# Patient Record
Sex: MALE | Race: WHITE | NOT HISPANIC OR LATINO | Employment: OTHER | URBAN - METROPOLITAN AREA
[De-identification: names, ages, dates, MRNs, and addresses within clinical notes are randomized per-mention and may not be internally consistent; named-entity substitution may affect disease eponyms.]

---

## 2017-08-25 ENCOUNTER — TELEPHONE (OUTPATIENT)
Dept: NEUROSURGERY | Facility: CLINIC | Age: 62
End: 2017-08-25

## 2017-08-25 NOTE — TELEPHONE ENCOUNTER
----- Message from Danika Romero sent at 8/24/2017 11:02 AM CDT -----   Patient would be contacted about, want to get back to full strength since surgery. [phone].

## 2017-09-20 ENCOUNTER — TELEPHONE (OUTPATIENT)
Dept: NEUROSURGERY | Facility: CLINIC | Age: 62
End: 2017-09-20

## 2017-09-20 NOTE — TELEPHONE ENCOUNTER
----- Message from Danika Carrillo LPN sent at 9/20/2017 10:01 AM CDT -----  Patient has questions for you. He had surgery by Dr. Chau a year ago. Please call [phone].

## 2017-09-20 NOTE — TELEPHONE ENCOUNTER
Spoke with pt regarding continuing Pt. Instructed pt to have facility send over a continuation form and we would sign and send it back. Pt verbalized understanding.

## 2017-09-26 ENCOUNTER — TELEPHONE (OUTPATIENT)
Dept: SPINE | Facility: CLINIC | Age: 62
End: 2017-09-26

## 2017-09-26 NOTE — TELEPHONE ENCOUNTER
----- Message from Patti Taylor LPN sent at 9/26/2017 12:26 PM CDT -----  Contact: Haley prajapati/ [facility]  Patient is requesting continued PT. [facility] states they would need a whole new order and not just a continuation signed. Can we do this for the patient without bringing him in for an appointment?  ----- Message -----  From: Aniyah Longoria  Sent: 9/26/2017  11:57 AM  To: Is[facility] Staff    Haley prajapati/ [facility] calling in regards to following up on a referral Physical Therapy. She hasn't received a response and wants to speak with the Nurse. Please advise. Call to pod. Call connected to pod. Warm transferred.  Call back Haley at   Fax   Thanks!

## 2017-09-26 NOTE — TELEPHONE ENCOUNTER
He was last seen in clinic 1 year ago and doing well with resolved pain post operatively.  With documentation of him doing so well after surgery, I do not what he need PT for.  He would need to be re-evaluated in clinic for us to place a PT order.      The alternative (and probably more appropirate) would be for his PCP to place the PT order.

## 2017-09-27 NOTE — TELEPHONE ENCOUNTER
Spoke with patient regarding below information. Patient states he is not having an increase in neck pain and feels he does not need to be reevaluated. He will make this request with his PCP.

## 2017-12-21 ENCOUNTER — HOSPITAL ENCOUNTER (EMERGENCY)
Facility: HOSPITAL | Age: 62
Discharge: HOME | End: 2017-12-21
Attending: EMERGENCY MEDICINE
Payer: MEDICARE

## 2017-12-21 VITALS
HEIGHT: 68 IN | RESPIRATION RATE: 18 BRPM | TEMPERATURE: 98 F | OXYGEN SATURATION: 95 % | DIASTOLIC BLOOD PRESSURE: 84 MMHG | WEIGHT: 179 LBS | BODY MASS INDEX: 27.13 KG/M2 | SYSTOLIC BLOOD PRESSURE: 156 MMHG | HEART RATE: 89 BPM

## 2017-12-21 DIAGNOSIS — R19.7 DIARRHEA, UNSPECIFIED TYPE: Primary | ICD-10-CM

## 2017-12-21 LAB
ANION GAP SERPL CALC-SCNC: 9 MMOL/L
BUN SERPL-MCNC: 10 MG/DL
CALCIUM SERPL-MCNC: 9.9 MG/DL
CHLORIDE SERPL-SCNC: 106 MMOL/L
CO2 SERPL-SCNC: 27 MMOL/L
CREAT SERPL-MCNC: 0.8 MG/DL
EST. GFR  (AFRICAN AMERICAN): >60 ML/MIN/1.73 M^2
EST. GFR  (NON AFRICAN AMERICAN): >60 ML/MIN/1.73 M^2
GLUCOSE SERPL-MCNC: 83 MG/DL
POTASSIUM SERPL-SCNC: 4.1 MMOL/L
RV AG STL QL IA.RAPID: NEGATIVE
SODIUM SERPL-SCNC: 142 MMOL/L
WBC #/AREA STL HPF: NORMAL /[HPF]

## 2017-12-21 PROCEDURE — 99283 EMERGENCY DEPT VISIT LOW MDM: CPT

## 2017-12-21 PROCEDURE — 89055 LEUKOCYTE ASSESSMENT FECAL: CPT

## 2017-12-21 PROCEDURE — 87449 NOS EACH ORGANISM AG IA: CPT

## 2017-12-21 PROCEDURE — 87427 SHIGA-LIKE TOXIN AG IA: CPT | Mod: 59

## 2017-12-21 PROCEDURE — 36415 COLL VENOUS BLD VENIPUNCTURE: CPT

## 2017-12-21 PROCEDURE — 87046 STOOL CULTR AEROBIC BACT EA: CPT | Mod: 59

## 2017-12-21 PROCEDURE — 80048 BASIC METABOLIC PNL TOTAL CA: CPT

## 2017-12-21 PROCEDURE — 87045 FECES CULTURE AEROBIC BACT: CPT

## 2017-12-21 PROCEDURE — 87425 ROTAVIRUS AG IA: CPT

## 2017-12-21 NOTE — ED PROVIDER NOTES
Encounter Date: 12/21/2017    SCRIBE #1 NOTE: I, Belle Song, am scribing for, and in the presence of, Dr. Malhotra.       History     Chief Complaint   Patient presents with    Diarrhea     x 2 weeks / increasing        12/21/2017 1:10 PM     Chief complaint: Diarrhea      Michael German is a 62 y.o. male with history of MS, HTN, GERD, neurogenic bladder who presents to the ED with complaint of increasing diarrhea over the last 3 days. Patient states that he has suffered with GI issues over the last year, initially with constipation and more recently diarrhea. He states that he has intermittently suffered with diarrhea for the last few months, but over the last three days his stools have been more watery and loose with 4-5 bowel movements per day. He describes the stool as a yogurt-like consistency. He reports some intermittent abdominal cramping, but states that is a side effect of a medication he is taking. He denies fever, change in appetite, or other complaints. He denies recent hospitalization, recent antibiotic use, or recent GI procedures. He states that he is scheduled for a colonoscopy, but is having difficulty doing the prep at home. He does have a gastroenterologist (unsure his name) and his PCP is Dr. Zaragoza.      The history is provided by the patient.     Review of patient's allergies indicates:  No Known Allergies  Past Medical History:   Diagnosis Date    Acute urinary retention     Acute UTI     treated twice in past three weeks with antibx per patient-since indwelling catheter    Back problem     herniated disc-lumbar region    GERD (gastroesophageal reflux disease)     Hypertension     130s/80s    MS (multiple sclerosis)     Neck stiffness     Neurogenic bladder     has indwelling catheter now    Sleep disturbance     Snoring     Visual impairment      Past Surgical History:   Procedure Laterality Date    FINGER SURGERY      LASER OF PROSTATE W/ GREEN LIGHT PVP      RECTAL  BOTOX INJECTION       Family History   Problem Relation Age of Onset    Dementia Mother     Parkinsonism Father     No Known Problems Sister     Hypertension Brother     Anesthesia problems Neg Hx      Social History   Substance Use Topics    Smoking status: Never Smoker    Smokeless tobacco: Never Used    Alcohol use Yes      Comment: occasionally drinks     Review of Systems   Constitutional: Negative for chills and fever.   Respiratory: Negative for shortness of breath.    Cardiovascular: Negative for chest pain.   Gastrointestinal: Positive for diarrhea. Negative for nausea and vomiting.        +Intermittent abdominal cramping   Skin: Negative for rash.   All other systems reviewed and are negative.      Physical Exam     Initial Vitals [12/21/17 0948]   BP Pulse Resp Temp SpO2   (!) 163/97 100 20 97.8 °F (36.6 °C) (!) 94 %      MAP       119         Physical Exam    Nursing note and vitals reviewed.  Constitutional: He appears well-developed and well-nourished. He is not diaphoretic.  Non-toxic appearance. He does not have a sickly appearance. He does not appear ill. No distress.   HENT:   Head: Normocephalic and atraumatic.   Eyes: EOM are normal.   Neck: Normal range of motion. Neck supple. Normal range of motion present. No neck rigidity.   Cardiovascular: Normal rate, regular rhythm and normal heart sounds. Exam reveals no gallop and no friction rub.    No murmur heard.  Pulmonary/Chest: Breath sounds normal. No respiratory distress. He has no wheezes. He has no rhonchi. He has no rales.   Abdominal: Soft. There is no tenderness.   Musculoskeletal:   Lower extremity contractures.   Neurological: He is alert and oriented to person, place, and time.   Skin: Skin is warm and dry. No rash noted.   Psychiatric: He has a normal mood and affect. His behavior is normal. Judgment and thought content normal.         ED Course   Procedures  Labs Reviewed   CLOSTRIDIUM DIFFICILE   CULTURE, STOOL    ENTEROHEMORRHAGIC E.COLI   BASIC METABOLIC PANEL   ROTAVIRUS ANTIGEN, STOOL   WBC, STOOL             Medical Decision Making:   History:   Old Medical Records: I decided to obtain old medical records.  Clinical Tests:   Lab Tests: Ordered and Reviewed            Scribe Attestation:   Scribe #1: I performed the above scribed service and the documentation accurately describes the services I performed. I attest to the accuracy of the note.    I, Dr. Malhotra, personally performed the services described in this documentation. All medical record entries made by the scribe were at my direction and in my presence.  I have reviewed the chart and agree that the record reflects my personal performance and is accurate and complete.12:15 AM 12/22/2017            ED Course as of Dec 21 1651   Thu Dec 21, 2017   1649 62-year-old male presents chronic diarrhea was has worsened in the last 3 days.  No risk factors for Clostridium difficile but this was sent in addition to other stool studies.  Patient is well-appearing with a normal metabolic panel.  No fevers no abdominal tenderness no indication for any abdominal imaging.  We will call the patient should any of his stool studies be positive.  [EF]      ED Course User Index  [EF] Luca Malhotra MD     Clinical Impression:   The encounter diagnosis was Diarrhea, unspecified type.    Disposition:   Disposition: Discharged  Condition: Stable                        Luca Malhotra MD  12/22/17 0015

## 2017-12-22 LAB
C DIFF GDH STL QL: NEGATIVE
C DIFF TOX A+B STL QL IA: NEGATIVE

## 2017-12-25 LAB
E COLI SXT1 STL QL IA: NEGATIVE
E COLI SXT2 STL QL IA: NEGATIVE

## 2017-12-26 LAB — BACTERIA STL CULT: NORMAL

## 2019-01-28 ENCOUNTER — HOSPITAL ENCOUNTER (OUTPATIENT)
Facility: HOSPITAL | Age: 64
Discharge: HOME HEALTH | End: 2019-02-01
Attending: EMERGENCY MEDICINE | Admitting: INTERNAL MEDICINE
Payer: MEDICARE

## 2019-01-28 DIAGNOSIS — G35 MS (MULTIPLE SCLEROSIS): ICD-10-CM

## 2019-01-28 DIAGNOSIS — R93.3 ABNORMAL CT SCAN, COLON: ICD-10-CM

## 2019-01-28 DIAGNOSIS — K59.00 CONSTIPATION, UNSPECIFIED CONSTIPATION TYPE: ICD-10-CM

## 2019-01-28 DIAGNOSIS — K56.41 FECAL IMPACTION: ICD-10-CM

## 2019-01-28 DIAGNOSIS — K50.119: ICD-10-CM

## 2019-01-28 DIAGNOSIS — K52.9 COLITIS: ICD-10-CM

## 2019-01-28 DIAGNOSIS — K59.04 CHRONIC IDIOPATHIC CONSTIPATION: Primary | ICD-10-CM

## 2019-01-28 DIAGNOSIS — R10.9 ABDOMINAL PAIN: ICD-10-CM

## 2019-01-28 LAB
ALBUMIN SERPL BCP-MCNC: 4.3 G/DL
ALP SERPL-CCNC: 110 U/L
ALT SERPL W/O P-5'-P-CCNC: 58 U/L
ANION GAP SERPL CALC-SCNC: 10 MMOL/L
AST SERPL-CCNC: 39 U/L
BACTERIA #/AREA URNS HPF: ABNORMAL /HPF
BASOPHILS # BLD AUTO: 0.1 K/UL
BASOPHILS NFR BLD: 0.3 %
BILIRUB SERPL-MCNC: 0.9 MG/DL
BILIRUB UR QL STRIP: NEGATIVE
BUN SERPL-MCNC: 13 MG/DL
CALCIUM SERPL-MCNC: 10.9 MG/DL
CHLORIDE SERPL-SCNC: 102 MMOL/L
CLARITY UR: ABNORMAL
CO2 SERPL-SCNC: 27 MMOL/L
COLOR UR: YELLOW
CREAT SERPL-MCNC: 0.9 MG/DL
DIFFERENTIAL METHOD: ABNORMAL
EOSINOPHIL # BLD AUTO: 0 K/UL
EOSINOPHIL NFR BLD: 0.2 %
ERYTHROCYTE [DISTWIDTH] IN BLOOD BY AUTOMATED COUNT: 13.9 %
EST. GFR  (AFRICAN AMERICAN): >60 ML/MIN/1.73 M^2
EST. GFR  (NON AFRICAN AMERICAN): >60 ML/MIN/1.73 M^2
GLUCOSE SERPL-MCNC: 98 MG/DL
GLUCOSE UR QL STRIP: NEGATIVE
HCT VFR BLD AUTO: 50.3 %
HGB BLD-MCNC: 16.4 G/DL
HGB UR QL STRIP: ABNORMAL
KETONES UR QL STRIP: ABNORMAL
LEUKOCYTE ESTERASE UR QL STRIP: ABNORMAL
LYMPHOCYTES # BLD AUTO: 0.5 K/UL
LYMPHOCYTES NFR BLD: 3.4 %
MCH RBC QN AUTO: 29.6 PG
MCHC RBC AUTO-ENTMCNC: 32.6 G/DL
MCV RBC AUTO: 91 FL
MICROSCOPIC COMMENT: ABNORMAL
MONOCYTES # BLD AUTO: 0.7 K/UL
MONOCYTES NFR BLD: 4.7 %
NEUTROPHILS # BLD AUTO: 14.5 K/UL
NEUTROPHILS NFR BLD: 91.4 %
NITRITE UR QL STRIP: NEGATIVE
PH UR STRIP: 6 [PH] (ref 5–8)
PLATELET # BLD AUTO: 248 K/UL
PMV BLD AUTO: 8.2 FL
POTASSIUM SERPL-SCNC: 4.2 MMOL/L
PROT SERPL-MCNC: 7.6 G/DL
PROT UR QL STRIP: NEGATIVE
RBC # BLD AUTO: 5.55 M/UL
RBC #/AREA URNS HPF: 20 /HPF (ref 0–4)
SODIUM SERPL-SCNC: 139 MMOL/L
SP GR UR STRIP: 1.02 (ref 1–1.03)
URN SPEC COLLECT METH UR: ABNORMAL
UROBILINOGEN UR STRIP-ACNC: 1 EU/DL
WBC # BLD AUTO: 15.9 K/UL
WBC #/AREA URNS HPF: 12 /HPF (ref 0–5)

## 2019-01-28 PROCEDURE — 25000003 PHARM REV CODE 250: Performed by: EMERGENCY MEDICINE

## 2019-01-28 PROCEDURE — S0030 INJECTION, METRONIDAZOLE: HCPCS | Performed by: EMERGENCY MEDICINE

## 2019-01-28 PROCEDURE — 96365 THER/PROPH/DIAG IV INF INIT: CPT

## 2019-01-28 PROCEDURE — 99285 EMERGENCY DEPT VISIT HI MDM: CPT | Mod: 25

## 2019-01-28 PROCEDURE — G0378 HOSPITAL OBSERVATION PER HR: HCPCS

## 2019-01-28 PROCEDURE — 80053 COMPREHEN METABOLIC PANEL: CPT

## 2019-01-28 PROCEDURE — 85025 COMPLETE CBC W/AUTO DIFF WBC: CPT

## 2019-01-28 PROCEDURE — 96375 TX/PRO/DX INJ NEW DRUG ADDON: CPT

## 2019-01-28 PROCEDURE — 81000 URINALYSIS NONAUTO W/SCOPE: CPT

## 2019-01-28 PROCEDURE — 87086 URINE CULTURE/COLONY COUNT: CPT

## 2019-01-28 PROCEDURE — 36415 COLL VENOUS BLD VENIPUNCTURE: CPT

## 2019-01-28 PROCEDURE — 96376 TX/PRO/DX INJ SAME DRUG ADON: CPT

## 2019-01-28 PROCEDURE — 63600175 PHARM REV CODE 636 W HCPCS: Performed by: EMERGENCY MEDICINE

## 2019-01-28 RX ORDER — AMOXICILLIN 250 MG
1 CAPSULE ORAL 2 TIMES DAILY
Status: DISCONTINUED | OUTPATIENT
Start: 2019-01-28 | End: 2019-02-01 | Stop reason: HOSPADM

## 2019-01-28 RX ORDER — METRONIDAZOLE 500 MG/100ML
500 INJECTION, SOLUTION INTRAVENOUS
Status: COMPLETED | OUTPATIENT
Start: 2019-01-28 | End: 2019-01-28

## 2019-01-28 RX ORDER — POLYETHYLENE GLYCOL 3350 17 G/17G
17 POWDER, FOR SOLUTION ORAL DAILY
Status: DISCONTINUED | OUTPATIENT
Start: 2019-01-29 | End: 2019-01-31

## 2019-01-28 RX ORDER — METRONIDAZOLE 500 MG/100ML
500 INJECTION, SOLUTION INTRAVENOUS
Status: DISCONTINUED | OUTPATIENT
Start: 2019-01-29 | End: 2019-01-31

## 2019-01-28 RX ORDER — GLUCAGON 1 MG
1 KIT INJECTION
Status: DISCONTINUED | OUTPATIENT
Start: 2019-01-28 | End: 2019-02-01 | Stop reason: HOSPADM

## 2019-01-28 RX ORDER — CIPROFLOXACIN 2 MG/ML
400 INJECTION, SOLUTION INTRAVENOUS
Status: DISCONTINUED | OUTPATIENT
Start: 2019-01-29 | End: 2019-01-31

## 2019-01-28 RX ORDER — POTASSIUM CHLORIDE 20 MEQ/15ML
40 SOLUTION ORAL
Status: DISCONTINUED | OUTPATIENT
Start: 2019-01-28 | End: 2019-02-01 | Stop reason: HOSPADM

## 2019-01-28 RX ORDER — CALCIUM CARBONATE 200(500)MG
2 TABLET,CHEWABLE ORAL 3 TIMES DAILY PRN
Status: DISCONTINUED | OUTPATIENT
Start: 2019-01-28 | End: 2019-02-01 | Stop reason: HOSPADM

## 2019-01-28 RX ORDER — CALCIUM CARBONATE 200(500)MG
2 TABLET,CHEWABLE ORAL EVERY 8 HOURS PRN
COMMUNITY

## 2019-01-28 RX ORDER — LOSARTAN POTASSIUM 25 MG/1
100 TABLET ORAL DAILY
Status: DISCONTINUED | OUTPATIENT
Start: 2019-01-29 | End: 2019-02-01 | Stop reason: HOSPADM

## 2019-01-28 RX ORDER — SODIUM CHLORIDE 9 MG/ML
INJECTION, SOLUTION INTRAVENOUS CONTINUOUS
Status: DISCONTINUED | OUTPATIENT
Start: 2019-01-28 | End: 2019-02-01 | Stop reason: HOSPADM

## 2019-01-28 RX ORDER — PANTOPRAZOLE SODIUM 40 MG/1
40 TABLET, DELAYED RELEASE ORAL DAILY
COMMUNITY
End: 2021-01-20 | Stop reason: SDUPTHER

## 2019-01-28 RX ORDER — RAMELTEON 8 MG/1
8 TABLET ORAL NIGHTLY PRN
Status: DISCONTINUED | OUTPATIENT
Start: 2019-01-28 | End: 2019-02-01 | Stop reason: HOSPADM

## 2019-01-28 RX ORDER — IBUPROFEN 200 MG
24 TABLET ORAL
Status: DISCONTINUED | OUTPATIENT
Start: 2019-01-28 | End: 2019-02-01 | Stop reason: HOSPADM

## 2019-01-28 RX ORDER — LANOLIN ALCOHOL/MO/W.PET/CERES
800 CREAM (GRAM) TOPICAL
Status: DISCONTINUED | OUTPATIENT
Start: 2019-01-28 | End: 2019-02-01 | Stop reason: HOSPADM

## 2019-01-28 RX ORDER — PANTOPRAZOLE SODIUM 40 MG/1
40 TABLET, DELAYED RELEASE ORAL DAILY
Status: DISCONTINUED | OUTPATIENT
Start: 2019-01-29 | End: 2019-02-01 | Stop reason: HOSPADM

## 2019-01-28 RX ORDER — ACETAMINOPHEN 500 MG
1000 TABLET ORAL EVERY 6 HOURS PRN
Status: DISCONTINUED | OUTPATIENT
Start: 2019-01-28 | End: 2019-02-01 | Stop reason: HOSPADM

## 2019-01-28 RX ORDER — POTASSIUM CHLORIDE 20 MEQ/15ML
60 SOLUTION ORAL
Status: DISCONTINUED | OUTPATIENT
Start: 2019-01-28 | End: 2019-02-01 | Stop reason: HOSPADM

## 2019-01-28 RX ORDER — IBUPROFEN 200 MG
16 TABLET ORAL
Status: DISCONTINUED | OUTPATIENT
Start: 2019-01-28 | End: 2019-02-01 | Stop reason: HOSPADM

## 2019-01-28 RX ORDER — LOPERAMIDE HYDROCHLORIDE 2 MG/1
2 CAPSULE ORAL 4 TIMES DAILY PRN
COMMUNITY
End: 2019-02-20

## 2019-01-28 RX ORDER — ONDANSETRON 2 MG/ML
8 INJECTION INTRAMUSCULAR; INTRAVENOUS EVERY 8 HOURS PRN
Status: DISCONTINUED | OUTPATIENT
Start: 2019-01-28 | End: 2019-02-01 | Stop reason: HOSPADM

## 2019-01-28 RX ORDER — LOSARTAN POTASSIUM 100 MG/1
100 TABLET ORAL DAILY
COMMUNITY
End: 2021-01-08 | Stop reason: SDUPTHER

## 2019-01-28 RX ORDER — CIPROFLOXACIN 2 MG/ML
400 INJECTION, SOLUTION INTRAVENOUS
Status: COMPLETED | OUTPATIENT
Start: 2019-01-28 | End: 2019-01-28

## 2019-01-28 RX ORDER — ENOXAPARIN SODIUM 100 MG/ML
40 INJECTION SUBCUTANEOUS EVERY 24 HOURS
Status: DISCONTINUED | OUTPATIENT
Start: 2019-01-28 | End: 2019-02-01 | Stop reason: HOSPADM

## 2019-01-28 RX ORDER — SODIUM CHLORIDE 0.9 % (FLUSH) 0.9 %
5 SYRINGE (ML) INJECTION
Status: DISCONTINUED | OUTPATIENT
Start: 2019-01-28 | End: 2019-02-01 | Stop reason: HOSPADM

## 2019-01-28 RX ADMIN — SODIUM CHLORIDE 1000 ML: 0.9 INJECTION, SOLUTION INTRAVENOUS at 09:01

## 2019-01-28 RX ADMIN — SODIUM PHOSPHATE, DIBASIC AND SODIUM PHOSPHATE, MONOBASIC 1 ENEMA: 7; 19 ENEMA RECTAL at 05:01

## 2019-01-28 RX ADMIN — CIPROFLOXACIN 400 MG: 2 INJECTION, SOLUTION INTRAVENOUS at 10:01

## 2019-01-28 RX ADMIN — METRONIDAZOLE 500 MG: 500 INJECTION, SOLUTION INTRAVENOUS at 09:01

## 2019-01-28 NOTE — ED NOTES
Enema not given very large loose brown stool noted up patients back to his knees. Bed bath given clean brief and chux applied. Able to assist with rolling in bed. Pt grey shorts placed in bag given to wife. MD aware med not given

## 2019-01-28 NOTE — ED PROVIDER NOTES
Encounter Date: 1/28/2019    SCRIBE #1 NOTE: I, Tricia Pollack, am scribing for, and in the presence of, Dr. Fairchild.       History     Chief Complaint   Patient presents with    Abdominal Pain     lower abd. pain / hx. constipation      01/28/2019  4:35 PM     Michael German is a 63 y.o. male who is presenting to ED for evaluation of lower abd pain since this morning. He describes his pain as a pressure in his lower abd radiating to his rectum. He also c/o constipation and his last normal bowel movement was four days ago. Denies nausea, vomiting, diarrhea, or fever. No other complaints noted at this time. Pt has a PMHx of GERD, Hypertension, MS, Neurogenic bladder. Pt has a PSHx that includes Laser of prostate w/ green light pvp; Finger surgery; and Rectal Botox injection.           The history is provided by the patient and a relative.     Review of patient's allergies indicates:  No Known Allergies  Past Medical History:   Diagnosis Date    Acute urinary retention     Acute UTI     treated twice in past three weeks with antibx per patient-since indwelling catheter    Back problem     herniated disc-lumbar region    GERD (gastroesophageal reflux disease)     Hypertension     130s/80s    MS (multiple sclerosis)     Neck stiffness     Neurogenic bladder     has indwelling catheter now    Sleep disturbance     Snoring     Visual impairment      Past Surgical History:   Procedure Laterality Date    CYSTOSCOPY N/A 4/19/2016    [facility]    CYSTOSCOPY N/A 5/6/2014    [facility] at [facility]    DISKECTOMY AND FUSION-ANTERIOR CERVICAL (ACDF)  C3/4 ACDF N/A 8/15/2016    [facility]    FINGER SURGERY      INJECTION, BOTULINUM TOXIN, TYPE A N/A 5/6/2014    [facility]    INJECTION-BOTOX N/A 4/19/2016    [facility]     LASER OF PROSTATE W/ GREEN LIGHT PVP      RECTAL BOTOX INJECTION      URODYNAMIC STUDY, FLUOROSCOPIC N/A 3/12/2014    Performed by [facility] at [facility] OR [address] [place] [address]     Family History   Problem Relation Age of Onset    Dementia Mother     Parkinsonism Father     No Known Problems Sister     Hypertension Brother     Anesthesia problems Neg Hx      Social History     Tobacco Use    Smoking status: Never Smoker    Smokeless tobacco: Never Used   Substance Use Topics    Alcohol use: Yes     Comment: occasionally drinks    Drug use: No     Review of Systems   Constitutional: Negative for activity change, appetite change, chills, fatigue and fever.   HENT: Negative for congestion.    Eyes: Negative for visual disturbance.   Respiratory: Negative for apnea and shortness of breath.    Cardiovascular: Negative for chest pain and palpitations.   Gastrointestinal: Positive for abdominal pain and constipation. Negative for abdominal distention, diarrhea, nausea and vomiting.        Positive for rectal pain.   Genitourinary: Negative for difficulty urinating.   Musculoskeletal: Negative for neck pain.   Skin: Negative for pallor and rash.   Neurological: Negative for headaches.   Hematological: Does not bruise/bleed easily.   Psychiatric/Behavioral: Negative for agitation.       Physical Exam     Initial Vitals [01/28/19 1541]   BP Pulse Resp Temp SpO2   111/74 86 16 97.6 °F (36.4 °C) 97 %      MAP       --         Physical Exam    Nursing note and vitals reviewed.  Constitutional: He appears well-developed and well-nourished.   HENT:   Head: Normocephalic and atraumatic.   Eyes: Conjunctivae are normal.   Neck: Normal range of motion. Neck supple.   Cardiovascular: Normal rate, regular rhythm and normal heart sounds. Exam reveals no gallop and no friction rub.    No murmur heard.  Pulmonary/Chest: Breath sounds normal. No respiratory distress. He has no wheezes. He has no rhonchi. He has no rales.   Abdominal:  Soft. He exhibits no distension. There is no tenderness.   Musculoskeletal: Normal range of motion.   Neurological: He is alert and oriented to person, place, and time.   Skin: Skin is warm and dry.   Psychiatric: He has a normal mood and affect.         ED Course   Procedures  Labs Reviewed   CBC W/ AUTO DIFFERENTIAL - Abnormal; Notable for the following components:       Result Value    WBC 15.90 (*)     MPV 8.2 (*)     Gran # (ANC) 14.5 (*)     Lymph # 0.5 (*)     Gran% 91.4 (*)     Lymph% 3.4 (*)     All other components within normal limits   COMPREHENSIVE METABOLIC PANEL - Abnormal; Notable for the following components:    Calcium 10.9 (*)     ALT 58 (*)     All other components within normal limits   URINALYSIS, REFLEX TO URINE CULTURE - Abnormal; Notable for the following components:    Appearance, UA Hazy (*)     Ketones, UA Trace (*)     Occult Blood UA 3+ (*)     Leukocytes, UA Trace (*)     All other components within normal limits    Narrative:     Preferred Collection Type->Urine, Clean Catch   URINALYSIS MICROSCOPIC - Abnormal; Notable for the following components:    RBC, UA 20 (*)     WBC, UA 12 (*)     Bacteria, UA Many (*)     All other components within normal limits    Narrative:     Preferred Collection Type->Urine, Clean Catch   CULTURE, URINE          Imaging Results          CT Abdomen Pelvis  Without Contrast (In process)                  Medical Decision Making:   History:   Old Medical Records: I decided to obtain old medical records.  ED Management:  63-year-old male with a history of advanced MS presents with bilateral lower abdominal pain with fecal impaction.  CT of the abdomen and pelvis reveals Stercoral colitis and he has leukocytosis with a WBC 56207.  He will be admitted for IV antibiotics and bowel cleansing.       APC / Resident Notes:   I, Dr. Anirudh Fairchild III, personally performed the services described in this documentation. All medical record entries made by the scribe  were at my direction and in my presence.  I have reviewed the chart and agree that the record reflects my personal performance and is accurate and complete       Scribe Attestation:   Scribe #1: I performed the above scribed service and the documentation accurately describes the services I performed. I attest to the accuracy of the note.            ED Course as of Jan 30 0913   Mon Jan 28, 2019 2038 No CT evidence of is significant diverticular disease or acute diverticulitis.    Large amount of stool within the rectosigmoid colon with thickening of the perirectal fascia, consistent with stercoral colitis.    Indeterminate sclerotic lesion involving the right distal humerus.  Outpatient MRI of the right elbow with contrast may be attempted for further evaluation.      Electronically signed by: Stanley Martines MD  Date: 01/28/2019  Time: 19:58  [BD]   2038 Joint decision making was made with patient and wife.  Patient had large stool on arrival as well as large stool after enema was given.  Despite both bowel movements patient's CT after bowel movement showed large amount of stool within the rectosigmoid colon with findings consistent for stercoral colitis.  Will admit for IV fluids antibiotics and GI consult morning.  [BD]      ED Course User Index  [BD] Quang Rangel MD     Clinical Impression:     1. Constipation, unspecified constipation type    2. Abdominal pain                                   Anirudh Fairchild III, MD  01/30/19 4971

## 2019-01-29 PROBLEM — K50.119: Status: ACTIVE | Noted: 2019-01-28

## 2019-01-29 LAB
ALBUMIN SERPL BCP-MCNC: 3.5 G/DL
ALP SERPL-CCNC: 90 U/L
ALT SERPL W/O P-5'-P-CCNC: 42 U/L
ANION GAP SERPL CALC-SCNC: 8 MMOL/L
AST SERPL-CCNC: 31 U/L
BASOPHILS # BLD AUTO: 0 K/UL
BASOPHILS NFR BLD: 0.1 %
BILIRUB SERPL-MCNC: 0.8 MG/DL
BUN SERPL-MCNC: 13 MG/DL
CALCIUM SERPL-MCNC: 9.5 MG/DL
CHLORIDE SERPL-SCNC: 109 MMOL/L
CO2 SERPL-SCNC: 23 MMOL/L
CREAT SERPL-MCNC: 0.8 MG/DL
DIFFERENTIAL METHOD: ABNORMAL
EOSINOPHIL # BLD AUTO: 0.1 K/UL
EOSINOPHIL NFR BLD: 0.7 %
ERYTHROCYTE [DISTWIDTH] IN BLOOD BY AUTOMATED COUNT: 14.1 %
EST. GFR  (AFRICAN AMERICAN): >60 ML/MIN/1.73 M^2
EST. GFR  (NON AFRICAN AMERICAN): >60 ML/MIN/1.73 M^2
GLUCOSE SERPL-MCNC: 110 MG/DL
HCT VFR BLD AUTO: 44.1 %
HGB BLD-MCNC: 14.5 G/DL
LYMPHOCYTES # BLD AUTO: 0.7 K/UL
LYMPHOCYTES NFR BLD: 7.4 %
MCH RBC QN AUTO: 29.9 PG
MCHC RBC AUTO-ENTMCNC: 32.9 G/DL
MCV RBC AUTO: 91 FL
MONOCYTES # BLD AUTO: 0.8 K/UL
MONOCYTES NFR BLD: 8.4 %
NEUTROPHILS # BLD AUTO: 7.8 K/UL
NEUTROPHILS NFR BLD: 83.4 %
PLATELET # BLD AUTO: 224 K/UL
PMV BLD AUTO: 9 FL
POTASSIUM SERPL-SCNC: 3.7 MMOL/L
PROT SERPL-MCNC: 6.1 G/DL
RBC # BLD AUTO: 4.84 M/UL
SODIUM SERPL-SCNC: 140 MMOL/L
TSH SERPL DL<=0.005 MIU/L-ACNC: 2.95 UIU/ML
WBC # BLD AUTO: 9.4 K/UL

## 2019-01-29 PROCEDURE — 63600175 PHARM REV CODE 636 W HCPCS: Performed by: NURSE PRACTITIONER

## 2019-01-29 PROCEDURE — 25000003 PHARM REV CODE 250: Performed by: NURSE PRACTITIONER

## 2019-01-29 PROCEDURE — 96367 TX/PROPH/DG ADDL SEQ IV INF: CPT

## 2019-01-29 PROCEDURE — 99204 PR OFFICE/OUTPT VISIT, NEW, LEVL IV, 45-59 MIN: ICD-10-PCS | Mod: ,,, | Performed by: INTERNAL MEDICINE

## 2019-01-29 PROCEDURE — 85025 COMPLETE CBC W/AUTO DIFF WBC: CPT

## 2019-01-29 PROCEDURE — 84443 ASSAY THYROID STIM HORMONE: CPT

## 2019-01-29 PROCEDURE — 99204 OFFICE O/P NEW MOD 45 MIN: CPT | Mod: ,,, | Performed by: INTERNAL MEDICINE

## 2019-01-29 PROCEDURE — 96361 HYDRATE IV INFUSION ADD-ON: CPT | Mod: 59

## 2019-01-29 PROCEDURE — S0030 INJECTION, METRONIDAZOLE: HCPCS | Performed by: NURSE PRACTITIONER

## 2019-01-29 PROCEDURE — 36415 COLL VENOUS BLD VENIPUNCTURE: CPT

## 2019-01-29 PROCEDURE — 80053 COMPREHEN METABOLIC PANEL: CPT

## 2019-01-29 PROCEDURE — 25000003 PHARM REV CODE 250: Performed by: INTERNAL MEDICINE

## 2019-01-29 PROCEDURE — G0378 HOSPITAL OBSERVATION PER HR: HCPCS

## 2019-01-29 PROCEDURE — 96366 THER/PROPH/DIAG IV INF ADDON: CPT

## 2019-01-29 PROCEDURE — 96372 THER/PROPH/DIAG INJ SC/IM: CPT | Mod: 59

## 2019-01-29 RX ORDER — DEXTROMETHORPHAN POLISTIREX 30 MG/5 ML
1 SUSPENSION, EXTENDED RELEASE 12 HR ORAL ONCE
Status: COMPLETED | OUTPATIENT
Start: 2019-01-29 | End: 2019-01-29

## 2019-01-29 RX ADMIN — ENOXAPARIN SODIUM 40 MG: 100 INJECTION SUBCUTANEOUS at 12:01

## 2019-01-29 RX ADMIN — LOSARTAN POTASSIUM 100 MG: 25 TABLET, FILM COATED ORAL at 09:01

## 2019-01-29 RX ADMIN — MINERAL OIL 1 ENEMA: 100 ENEMA RECTAL at 10:01

## 2019-01-29 RX ADMIN — ACETAMINOPHEN 1000 MG: 500 TABLET, COATED ORAL at 02:01

## 2019-01-29 RX ADMIN — STANDARDIZED SENNA CONCENTRATE AND DOCUSATE SODIUM 1 TABLET: 8.6; 5 TABLET, FILM COATED ORAL at 09:01

## 2019-01-29 RX ADMIN — Medication: at 12:01

## 2019-01-29 RX ADMIN — PANTOPRAZOLE SODIUM 40 MG: 40 TABLET, DELAYED RELEASE ORAL at 09:01

## 2019-01-29 RX ADMIN — CIPROFLOXACIN 400 MG: 2 INJECTION, SOLUTION INTRAVENOUS at 09:01

## 2019-01-29 RX ADMIN — ENOXAPARIN SODIUM 40 MG: 100 INJECTION SUBCUTANEOUS at 05:01

## 2019-01-29 RX ADMIN — POLYETHYLENE GLYCOL 3350 17 G: 17 POWDER, FOR SOLUTION ORAL at 09:01

## 2019-01-29 RX ADMIN — SODIUM CHLORIDE: 0.9 INJECTION, SOLUTION INTRAVENOUS at 11:01

## 2019-01-29 RX ADMIN — METRONIDAZOLE 500 MG: 500 INJECTION, SOLUTION INTRAVENOUS at 05:01

## 2019-01-29 RX ADMIN — METRONIDAZOLE 500 MG: 500 INJECTION, SOLUTION INTRAVENOUS at 02:01

## 2019-01-29 RX ADMIN — METRONIDAZOLE 500 MG: 500 INJECTION, SOLUTION INTRAVENOUS at 09:01

## 2019-01-29 RX ADMIN — STANDARDIZED SENNA CONCENTRATE AND DOCUSATE SODIUM 1 TABLET: 8.6; 5 TABLET, FILM COATED ORAL at 12:01

## 2019-01-29 RX ADMIN — SODIUM CHLORIDE: 0.9 INJECTION, SOLUTION INTRAVENOUS at 12:01

## 2019-01-29 NOTE — ASSESSMENT & PLAN NOTE
With resultant colitis, severe pain.  Enema for bowel clearance and initiation of daily bowel regimen: miralax, senna to start.

## 2019-01-29 NOTE — ASSESSMENT & PLAN NOTE
Acute, severe enough  To cause colitis-; will check TSH and initiate bowel regimen per GI recommendations  With resultant colitis, severe pain.  Enema for bowel clearance and initiation of daily bowel regimen: miralax, senna to start.

## 2019-01-29 NOTE — ASSESSMENT & PLAN NOTE
Stercoral colitis in constipated patient.  No response to fleet's enema.  Significant pain.  Will give brown bomb enema to facilitate bowel clearance and then institute bowel regimen including miralax, senna.  Consult with GI for further recommendations prior to discharge.  Not certain what role abx play here as his colitis is from constipation and we will treat cause.  Will continue cipro/flagyl until seen by GI. Hydrate. Discussed bowel rest with patient-- he is hungry and requests full liquids.

## 2019-01-29 NOTE — HPI
Michael German is a 64 yo male with PMHx significant for MS, chronic bowel issues fluctuating between constipation and diarrhea, and HTN.  He presented to the ED with c/o 8/10 lower abdominal pain with radiation to his rectum.  He reports no BM x 4 days and prior to that had been only having watery stool so he began taking imodium.  He stopped the imodium once he had stopped having BMs.  He denies trying anything at home for constipation. He denies any N/V, fever/chills, CP, or SOB. He reports some recent difficulty emptying his bladder 2/2 constipation. He is wheelchair bound with MS for which he has failed all attempted treatment regimens. He was given a fleets enema in ED and has has some liquid stool since, but continues with rectal pressure and pain rated 3/10 at present.  CT with large stool burden.  Other pertinent medical history as below:

## 2019-01-29 NOTE — ASSESSMENT & PLAN NOTE
Urinating okay with reported incomplete emptying.  If issues with urinary retention may require straight cath.

## 2019-01-29 NOTE — ASSESSMENT & PLAN NOTE
Chronic, stable.  Continue losartan, monitor BP closely, and titrate medication as required for sustained BP control and avoid hypotension  Hypertension Medications             losartan (COZAAR) 100 MG tablet Take 100 mg by mouth once daily.      Hospital Medications             losartan tablet 100 mg Take 4 tablets (100 mg total) by mouth once daily.

## 2019-01-29 NOTE — ED NOTES
Patient identifiers for Michael German checked and correct.  LOC: Patient is awake, alert, and aware of environment with an appropriate affect. Patient is oriented x 3 and speaking appropriately. Wife is caregiver at home has bath aides and wound care, alert calm uses call light for needs.   APPEARANCE: Patient resting comfortably and in no acute distress. Patient is clean and well groomed, patient's clothing is properly fastened.  SKIN: The skin is warm and dry. Patient has normal skin turgor and moist mucus membrances. Skin is intact; no bruising or breakdown noted. Except for red and dark area to buttocks has wound care at home dressing was in place on admit to ER but soiled with stool, dressing removed picture of wound in chart per PA, small areas of skin break down states he has had wound for over a year   MUSKULOSKELETAL: Pt has a HX of MS nonambulatory, contractures noted to left arm and partial to right arm decreased sensation, uses motorized WC. Randee lift used to transfer pt to bed on admit.   RESPIRATORY: Airway is open and patent. Respirations are spontaneous and non-labored with normal effort and rate.  CARDIAC: Patient has a normal rate and rhythm. No peripheral edema noted. Capillary refill < 3 seconds.  ABDOMEN: No distention noted. Bowel sounds active in all 4 quadrants. Soft and non-tender upon palpation. Incontinent of bowel and bladder able at times to feel BM, states constipation for a week PTA, on admit large liquid stool noted, stool x4 during ER visit large amounts liquid brown.   NEUROLOGICAL: PERRL. Facial expression is symmetrical. Decreased sensation to extremities and incontinent of bowel and bladder due to MS, right arm contracture, left arm partially contracted. Able to assist with rolling in bed needs assist with position change.   Allergies reported: Review of patient's allergies indicates:  No Known Allergies

## 2019-01-29 NOTE — ASSESSMENT & PLAN NOTE
Failed outpatient medications; follows with Dr. Sanchez.  Has home PT/OT.  Consult while here an continue f/u outpatient.

## 2019-01-29 NOTE — ASSESSMENT & PLAN NOTE
Stercoral colitis in constipated patient.  No response to fleet's enema.  Significant pain.   Improved after  brown bomb enema to facilitate bowel clearance and then institute disimpaction of stool ball per GI recommendations-; after disimpaction gastrograffin enema recommended.      Consulted with GI for further recommendations as ntoed above   .Continue  abx -- colitis is from constipation and we will treat cause.  Will continue cipro/flagyl until seen by GI. Hydrate intravenously and monitor closely for evidence of ischemia/bleeding

## 2019-01-29 NOTE — ED NOTES
Large amount liquid brown stool noted, bed bath given new chux pads placed. Mikhail MUÑOZ NP in to see pt picture of coccyx wound taken and in chart.   IV infusing well. Wife remains at bedside

## 2019-01-29 NOTE — SUBJECTIVE & OBJECTIVE
Past Medical History:   Diagnosis Date    Acute urinary retention     Acute UTI     treated twice in past three weeks with antibx per patient-since indwelling catheter    Back problem     herniated disc-lumbar region    GERD (gastroesophageal reflux disease)     Hypertension     130s/80s    MS (multiple sclerosis)     Neck stiffness     Neurogenic bladder     has indwelling catheter now    Sleep disturbance     Snoring     Visual impairment        Past Surgical History:   Procedure Laterality Date    CYSTOSCOPY N/A 4/19/2016    Performed by Darline [facility]cid MD at [facility] OR [facility]    CYSTOSCOPY N/A 5/6/2014    Performed by [facility]line [facility]id [facility] at [facility] OR [facility]    DISKECTOMY AND FUSION-ANTERIOR CERVICAL (ACDF)  C3/4 ACDF N/A 8/15/2016    Performed by Shant Chau MD at [facility] OR    FINGER SURGERY      INJECTION, BOTULINUM TOXIN, TYPE A N/A 5/6/2014    Performed by [facility] at [facility] OR [facility]    INJECTION-BOTOX N/A 4/19/2016    Performed by [facility]cid MD at [facility] OR [facility]    LASER OF PROSTATE W/ GREEN LIGHT PVP      RECTAL BOTOX INJECTION      URODYNAMIC STUDY, FLUOROSCOPIC N/A 3/12/2014    Performed by Darline Delcid MD at [facility] OR [facility]       Review of patient's allergies indicates:  No Known Allergies    Current Facility-Administered Medications on File Prior to Encounter   Medication    0.9%  NaCl infusion     Current Outpatient Medications on File Prior to Encounter   Medication Sig    calcium carbonate (TUMS) 200 mg calcium (500 mg) chewable tablet Take 2 tablets by mouth 3 (three) times daily as needed for Heartburn.    loperamide (IMODIUM) 2 mg capsule Take 2 mg by mouth 4 (four) times daily as needed for Diarrhea.    losartan (COZAAR) 100 MG tablet Take 100 mg by mouth once daily.    pantoprazole (PROTONIX) 40 MG tablet Take 40 mg by mouth once daily.    [DISCONTINUED] ascorbic acid (VITAMIN C) 1000 MG tablet Take 1,000 mg by mouth once daily.       [DISCONTINUED] clotrimazole (LOTRIMIN) 1 % cream Apply topically 2 (two) times daily. (Patient taking differently: Apply topically 2 (two) times daily as needed. )    [DISCONTINUED] cyanocobalamin (VITAMIN B-12) 500 MCG tablet Take 500 mcg by mouth once daily.    [DISCONTINUED] docusate sodium (COLACE) 100 MG capsule Take 1 capsule (100 mg total) by mouth once daily.    [DISCONTINUED] enalapril (VASOTEC) 20 MG tablet Take 20 mg by mouth every evening.     [DISCONTINUED] ibuprofen (ADVIL,MOTRIN) 200 MG tablet Take 400 mg by mouth every evening. Per Dr. Chau pt to hold for 10 days prior to surgery    [DISCONTINUED] magnesium oxide (MAG-OX) 400 mg tablet Take 400 mg by mouth every evening.    [DISCONTINUED] miconazole (MICOTIN) 2 % cream Apply topically 2 (two) times daily. Apply in-between toes    [DISCONTINUED] mineral oil liquid Take 30 mLs by mouth daily as needed for Constipation.    [DISCONTINUED] oxycodone-acetaminophen (PERCOCET) 7.5-325 mg per tablet Take 1 tablet by mouth every 4 (four) hours as needed.    [DISCONTINUED] pantoprazole (PROTONIX) 20 MG tablet Take 1 tablet (20 mg total) by mouth once daily. (Patient taking differently: Take 20 mg by mouth every evening. )    [DISCONTINUED] polyethylene glycol (GLYCOLAX) 17 gram PwPk Take 17 g by mouth once daily. (Patient taking differently: Take 17 g by mouth continuous prn. )    [DISCONTINUED] tizanidine (ZANAFLEX) 2 MG tablet Take 2 tablets (4 mg total) by mouth every 8 (eight) hours as needed (spasm).     Family History     Problem Relation (Age of Onset)    Dementia Mother    Hypertension Brother    No Known Problems Sister    Parkinsonism Father        Tobacco Use    Smoking status: Never Smoker    Smokeless tobacco: Never Used   Substance and Sexual Activity    Alcohol use: Yes     Comment: occasionally drinks    Drug use: No    Sexual activity: Not on file     Review of Systems   Constitutional: Negative for activity change, appetite  change, chills, fatigue and fever.   HENT: Negative for congestion, postnasal drip, sore throat and trouble swallowing.    Eyes: Negative for photophobia and visual disturbance.   Respiratory: Negative for cough, shortness of breath and wheezing.    Cardiovascular: Negative for chest pain, palpitations and leg swelling.   Gastrointestinal: Positive for abdominal pain and constipation. Negative for abdominal distention, blood in stool, diarrhea, nausea and vomiting.   Genitourinary: Positive for difficulty urinating. Negative for dysuria, flank pain, hematuria and urgency.   Musculoskeletal: Negative for arthralgias and myalgias.   Skin: Negative for color change.   Neurological: Positive for weakness (chronic, unchanged). Negative for dizziness and light-headedness.   Psychiatric/Behavioral: Negative for confusion. The patient is not nervous/anxious.      Objective:     Vital Signs (Most Recent):  Temp: 97.6 °F (36.4 °C) (01/28/19 1541)  Pulse: 86 (01/28/19 1541)  Resp: 16 (01/28/19 1541)  BP: 111/74 (01/28/19 1541)  SpO2: 97 % (01/28/19 1541) Vital Signs (24h Range):  Temp:  [97.6 °F (36.4 °C)] 97.6 °F (36.4 °C)  Pulse:  [86] 86  Resp:  [16] 16  SpO2:  [97 %] 97 %  BP: (111)/(74) 111/74     Weight: 76.2 kg (168 lb)  Body mass index is 25.54 kg/m².    Physical Exam   Constitutional: He is oriented to person, place, and time. He appears well-developed and well-nourished. No distress.   HENT:   Head: Normocephalic and atraumatic.   Eyes: Conjunctivae and EOM are normal. Pupils are equal, round, and reactive to light.   Neck: Normal range of motion. Neck supple. No thyromegaly present.   Cardiovascular: Normal rate, regular rhythm, normal heart sounds and intact distal pulses.   Pulmonary/Chest: Effort normal and breath sounds normal. No respiratory distress.   Abdominal: Soft. Bowel sounds are normal. He exhibits distension. There is no tenderness.   Musculoskeletal: He exhibits no edema or tenderness.   RIGHT arm  contracted; minimal movement BLEs (Chronic)     Neurological: He is alert and oriented to person, place, and time. No cranial nerve deficit.   Skin: Skin is warm and dry. Capillary refill takes less than 2 seconds. No erythema.   Sacral wound-- seems to be well-healing.  See photo.     Psychiatric: He has a normal mood and affect. His behavior is normal. Judgment and thought content normal.         CRANIAL NERVES     CN III, IV, VI   Pupils are equal, round, and reactive to light.  Extraocular motions are normal.            Significant Labs:   CBC:   Recent Labs   Lab 01/28/19  1705   WBC 15.90*   HGB 16.4   HCT 50.3        CMP:   Recent Labs   Lab 01/28/19  1705      K 4.2      CO2 27   GLU 98   BUN 13   CREATININE 0.9   CALCIUM 10.9*   PROT 7.6   ALBUMIN 4.3   BILITOT 0.9   ALKPHOS 110   AST 39   ALT 58*   ANIONGAP 10   EGFRNONAA >60       Significant Imaging:   CT A/P: Large amount of stool within the rectosigmoid colon with thickening of the perirectal fascia, consistent with stercoral colitis.    Indeterminate sclerotic lesion involving the right distal humerus.  Outpatient MRI of the right elbow with contrast may be attempted for further evaluation.

## 2019-01-29 NOTE — PROGRESS NOTES
[facility] Hospital Medicine  Progress Note    Patient Name: Michael German  MRN: [id number]  Patient Class: OP- Observation   Admission Date: 1/28/2019  Length of Stay: 0 days  Attending Physician: Carlie Ma MD  Primary Care Provider: Av Zaragoza MD        Subjective:     Principal Problem:Colitis, regional, unspecified complication    HPI:  Michael German is a 62 yo male with PMHx significant for MS, chronic bowel issues fluctuating between constipation and diarrhea, and HTN.  He presented to the ED with c/o 8/10 lower abdominal pain with radiation to his rectum.  He reports no BM x 4 days and prior to that had been only having watery stool so he began taking imodium.  He stopped the imodium once he had stopped having BMs.  He denies trying anything at home for constipation. He denies any N/V, fever/chills, CP, or SOB. He reports some recent difficulty emptying his bladder 2/2 constipation. He is wheelchair bound with MS for which he has failed all attempted treatment regimens. He was given a fleets enema in ED and has has some liquid stool since, but continues with rectal pressure and pain rated 3/10 at present.  CT with large stool burden.  Other pertinent medical history as below:    Hospital Course:  No notes on file    Interval History:   Pt seen/examined-less abd pain -has had multiple stools today   Nurses report no stool in rectal vault-  Discussed with Dr Burdick-gastrograffin enema next step         Review of Systems   Constitutional: Negative for activity change, chills, fatigue and fever.   HENT: Negative for postnasal drip and sore throat.    Respiratory: Negative for cough, shortness of breath and wheezing.    Cardiovascular: Negative for chest pain, palpitations and leg swelling.   Gastrointestinal: Positive for abdominal pain and constipation. Negative for abdominal distention, blood in stool, diarrhea, nausea and vomiting.   Genitourinary: Positive for difficulty  urinating. Negative for dysuria, flank pain, hematuria and urgency.   Musculoskeletal: Negative for arthralgias and myalgias.   Skin: Negative for color change.   Neurological: Positive for weakness (chronic, unchanged). Negative for dizziness and light-headedness.        MS-wheel chair bound    Psychiatric/Behavioral: Negative for confusion. The patient is not nervous/anxious.      Objective:     Vital Signs (Most Recent):  Temp: 97.8 °F (36.6 °C) (01/29/19 1144)  Pulse: 83 (01/29/19 1144)  Resp: 19 (01/29/19 1144)  BP: 124/76 (01/29/19 1144)  SpO2: 95 % (01/29/19 1144) Vital Signs (24h Range):  Temp:  [96.1 °F (35.6 °C)-97.8 °F (36.6 °C)] 97.8 °F (36.6 °C)  Pulse:  [83-94] 83  Resp:  [16-20] 19  SpO2:  [90 %-97 %] 95 %  BP: (111-144)/(67-76) 124/76     Weight: 76.2 kg (168 lb)  Body mass index is 25.54 kg/m².    Intake/Output Summary (Last 24 hours) at 1/29/2019 1504  Last data filed at 1/29/2019 0600  Gross per 24 hour   Intake 800 ml   Output --   Net 800 ml      Physical Exam   Constitutional: He is oriented to person, place, and time. He appears well-developed and well-nourished. No distress.   HENT:   Head: Normocephalic and atraumatic.   Nose: Nose normal.   Mouth/Throat: Oropharynx is clear and moist.   Eyes: Conjunctivae are normal. No scleral icterus.   Neck: Normal range of motion. Neck supple. No thyromegaly present.   Cardiovascular: Normal rate, regular rhythm, normal heart sounds and intact distal pulses.   Pulmonary/Chest: Effort normal and breath sounds normal. No respiratory distress.   Abdominal: Soft. Bowel sounds are normal. He exhibits no distension. There is tenderness in the right lower quadrant and suprapubic area.   Musculoskeletal: He exhibits no edema or tenderness.   RIGHT arm contracted; minimal movement BLEs (Chronic)     Neurological: He is alert and oriented to person, place, and time. No cranial nerve deficit.   Skin: Skin is warm and dry. Capillary refill takes less than 2  seconds. No erythema.   Sacral wound-- seems to be well-healing.  See photo.     Psychiatric: He has a normal mood and affect. His behavior is normal. Judgment and thought content normal.   Nursing note and vitals reviewed.      Significant Labs:   BMP:   Recent Labs   Lab 01/29/19  0419         K 3.7      CO2 23   BUN 13   CREATININE 0.8   CALCIUM 9.5     CBC:   Recent Labs   Lab 01/28/19  1705 01/29/19  0419   WBC 15.90* 9.40   HGB 16.4 14.5   HCT 50.3 44.1    224     TSH: No results for input(s): TSH in the last 4320 hours.    Significant Imaging: I have reviewed and interpreted all pertinent imaging results/findings within the past 24 hours.   kub-large rectal stool ball in vault  CTAP-  Large amount of stool within the rectosigmoid colon with thickening of the perirectal fascia, consistent with stercoral colitis.    Assessment/Plan:      * Colitis, regional, unspecified complication    Stercoral colitis in constipated patient.  No response to fleet's enema.  Significant pain.   Improved after  brown bomb enema to facilitate bowel clearance and then institute disimpaction of stool ball per GI recommendations-; after disimpaction gastrograffin enema recommended.      Consulted with GI for further recommendations as ntoed above   .Continue  abx -- colitis is from constipation and we will treat cause.  Will continue cipro/flagyl until seen by GI. Hydrate intravenously and monitor closely for evidence of ischemia/bleeding      Constipation    Acute, severe enough  To cause colitis-; will check TSH and initiate bowel regimen per GI recommendations  With resultant colitis, severe pain.  Enema for bowel clearance and initiation of daily bowel regimen: miralax, senna to start.        UTI (urinary tract infection)    Continue cipro.  Follow urine culture and adjust medication as clinically indicated.        GERD (gastroesophageal reflux disease)    Chronic issue, utilize TUMS PRN as per home  use.       Hypertension    Chronic, stable.  Continue losartan, monitor BP closely, and titrate medication as required for sustained BP control and avoid hypotension  Hypertension Medications             losartan (COZAAR) 100 MG tablet Take 100 mg by mouth once daily.      Hospital Medications             losartan tablet 100 mg Take 4 tablets (100 mg total) by mouth once daily.             Neurogenic bladder    Urinating okay with reported incomplete emptying.--check PVR and consult urology as indicated -especially in light of infection     If issues with urinary retention may require straight cath.Chronic due to MS        MS (multiple sclerosis)    Failed outpatient medications; follows with Dr. Sanchez.  Has home PT/OT.  Consult while here an continue f/u outpatient.          VTE Risk Mitigation (From admission, onward)        Ordered     enoxaparin injection 40 mg  Daily      01/28/19 2309     IP VTE HIGH RISK PATIENT  Once      01/28/19 2309              Carlie Ma MD  Department of Hospital Medicine   [facility]

## 2019-01-29 NOTE — ASSESSMENT & PLAN NOTE
Urinating okay with reported incomplete emptying.--check PVR and consult urology as indicated -especially in light of infection     If issues with urinary retention may require straight cath.Chronic due to MS

## 2019-01-29 NOTE — ASSESSMENT & PLAN NOTE
Chronic, stable.  Continue losartan, monitor BP closely, and titrate medication as required for sustained BP control.

## 2019-01-29 NOTE — PLAN OF CARE
PCP is Dr Zaragoza.  Verified insurance as [employer].  Pharmacy is [email or web address] on [email or web address].  Patient with h/o of MS, and is wheelchair bound.  Electric wheelchair at bedside.  Active with [facility] (nurse, aide, PT).  Discharge plan is home with HH.       01/29/19 1100   Discharge Assessment   Assessment Type Discharge Planning Assessment   Confirmed/corrected address and phone number on facesheet? Yes   Assessment information obtained from? Patient;Other  (spouse)   Prior to hospitilization cognitive status: Alert/Oriented   Prior to hospitalization functional status: Wheelchair Bound;Needs Assistance   Current cognitive status: Alert/Oriented   Current Functional Status: Wheelchair Bound;Needs Assistance   Lives With spouse   Able to Return to Prior Arrangements yes   Is patient able to care for self after discharge? No   Patient's perception of discharge disposition home health   Readmission Within the Last 30 Days no previous admission in last 30 days   Patient currently receives any other outside agency services? Yes   Name and contact number of agency or person providing outside services [facility] (nurse, aide, PT)   Is it the patient/care giver preference to resume care with the current outside agency? Yes   Equipment Currently Used at Home wheelchair;power chair;lift device;hospital bed;shower chair   Do you have any problems affording any of your prescribed medications? No   Is the patient taking medications as prescribed? yes   Does the patient have transportation home? Yes   Transportation Anticipated family or friend will provide   Dialysis Name and Scheduled days none   Does the patient receive services at the [facility] Clinic? No   Discharge Plan A Home Health   DME Needed Upon Discharge  none   Patient/Family in Agreement with Plan yes

## 2019-01-29 NOTE — SUBJECTIVE & OBJECTIVE
Interval History:   Pt seen/examined-less abd pain -has had multiple stools today   Nurses report no stool in rectal vault-  Discussed with Dr Burdick-gastrograffin enema next step         Review of Systems   Constitutional: Negative for activity change, chills, fatigue and fever.   HENT: Negative for postnasal drip and sore throat.    Respiratory: Negative for cough, shortness of breath and wheezing.    Cardiovascular: Negative for chest pain, palpitations and leg swelling.   Gastrointestinal: Positive for abdominal pain and constipation. Negative for abdominal distention, blood in stool, diarrhea, nausea and vomiting.   Genitourinary: Positive for difficulty urinating. Negative for dysuria, flank pain, hematuria and urgency.   Musculoskeletal: Negative for arthralgias and myalgias.   Skin: Negative for color change.   Neurological: Positive for weakness (chronic, unchanged). Negative for dizziness and light-headedness.        MS-wheel chair bound    Psychiatric/Behavioral: Negative for confusion. The patient is not nervous/anxious.      Objective:     Vital Signs (Most Recent):  Temp: 97.8 °F (36.6 °C) (01/29/19 1144)  Pulse: 83 (01/29/19 1144)  Resp: 19 (01/29/19 1144)  BP: 124/76 (01/29/19 1144)  SpO2: 95 % (01/29/19 1144) Vital Signs (24h Range):  Temp:  [96.1 °F (35.6 °C)-97.8 °F (36.6 °C)] 97.8 °F (36.6 °C)  Pulse:  [83-94] 83  Resp:  [16-20] 19  SpO2:  [90 %-97 %] 95 %  BP: (111-144)/(67-76) 124/76     Weight: 76.2 kg (168 lb)  Body mass index is 25.54 kg/m².    Intake/Output Summary (Last 24 hours) at 1/29/2019 1504  Last data filed at 1/29/2019 0600  Gross per 24 hour   Intake 800 ml   Output --   Net 800 ml      Physical Exam   Constitutional: He is oriented to person, place, and time. He appears well-developed and well-nourished. No distress.   HENT:   Head: Normocephalic and atraumatic.   Nose: Nose normal.   Mouth/Throat: Oropharynx is clear and moist.   Eyes: Conjunctivae are normal. No scleral  icterus.   Neck: Normal range of motion. Neck supple. No thyromegaly present.   Cardiovascular: Normal rate, regular rhythm, normal heart sounds and intact distal pulses.   Pulmonary/Chest: Effort normal and breath sounds normal. No respiratory distress.   Abdominal: Soft. Bowel sounds are normal. He exhibits no distension. There is tenderness in the right lower quadrant and suprapubic area.   Musculoskeletal: He exhibits no edema or tenderness.   RIGHT arm contracted; minimal movement BLEs (Chronic)     Neurological: He is alert and oriented to person, place, and time. No cranial nerve deficit.   Skin: Skin is warm and dry. Capillary refill takes less than 2 seconds. No erythema.   Sacral wound-- seems to be well-healing.  See photo.     Psychiatric: He has a normal mood and affect. His behavior is normal. Judgment and thought content normal.   Nursing note and vitals reviewed.      Significant Labs:   BMP:   Recent Labs   Lab 01/29/19 0419         K 3.7      CO2 23   BUN 13   CREATININE 0.8   CALCIUM 9.5     CBC:   Recent Labs   Lab 01/28/19  1705 01/29/19  0419   WBC 15.90* 9.40   HGB 16.4 14.5   HCT 50.3 44.1    224     TSH: No results for input(s): TSH in the last 4320 hours.    Significant Imaging: I have reviewed and interpreted all pertinent imaging results/findings within the past 24 hours.   kub-large rectal stool ball in vault  CTAP-  Large amount of stool within the rectosigmoid colon with thickening of the perirectal fascia, consistent with stercoral colitis.

## 2019-01-30 LAB
ALBUMIN SERPL BCP-MCNC: 3.3 G/DL
ALP SERPL-CCNC: 75 U/L
ALT SERPL W/O P-5'-P-CCNC: 40 U/L
ANION GAP SERPL CALC-SCNC: 8 MMOL/L
AST SERPL-CCNC: 36 U/L
BACTERIA UR CULT: NORMAL
BASOPHILS # BLD AUTO: 0 K/UL
BASOPHILS NFR BLD: 0.6 %
BILIRUB SERPL-MCNC: 0.7 MG/DL
BUN SERPL-MCNC: 8 MG/DL
CALCIUM SERPL-MCNC: 8.4 MG/DL
CHLORIDE SERPL-SCNC: 107 MMOL/L
CO2 SERPL-SCNC: 22 MMOL/L
CREAT SERPL-MCNC: 0.8 MG/DL
DIFFERENTIAL METHOD: ABNORMAL
EOSINOPHIL # BLD AUTO: 0.3 K/UL
EOSINOPHIL NFR BLD: 3.1 %
ERYTHROCYTE [DISTWIDTH] IN BLOOD BY AUTOMATED COUNT: 13.9 %
EST. GFR  (AFRICAN AMERICAN): >60 ML/MIN/1.73 M^2
EST. GFR  (NON AFRICAN AMERICAN): >60 ML/MIN/1.73 M^2
GLUCOSE SERPL-MCNC: 83 MG/DL
HCT VFR BLD AUTO: 41.4 %
HGB BLD-MCNC: 13.7 G/DL
LYMPHOCYTES # BLD AUTO: 0.9 K/UL
LYMPHOCYTES NFR BLD: 10.7 %
MCH RBC QN AUTO: 29.9 PG
MCHC RBC AUTO-ENTMCNC: 33.2 G/DL
MCV RBC AUTO: 90 FL
MONOCYTES # BLD AUTO: 0.6 K/UL
MONOCYTES NFR BLD: 7.4 %
NEUTROPHILS # BLD AUTO: 6.6 K/UL
NEUTROPHILS NFR BLD: 78.2 %
PLATELET # BLD AUTO: 204 K/UL
PMV BLD AUTO: 8.1 FL
POTASSIUM SERPL-SCNC: 3.5 MMOL/L
PROT SERPL-MCNC: 5.8 G/DL
RBC # BLD AUTO: 4.59 M/UL
SODIUM SERPL-SCNC: 137 MMOL/L
WBC # BLD AUTO: 8.5 K/UL

## 2019-01-30 PROCEDURE — 96376 TX/PRO/DX INJ SAME DRUG ADON: CPT

## 2019-01-30 PROCEDURE — 99214 OFFICE O/P EST MOD 30 MIN: CPT | Mod: ,,, | Performed by: INTERNAL MEDICINE

## 2019-01-30 PROCEDURE — 25000003 PHARM REV CODE 250: Performed by: SURGERY

## 2019-01-30 PROCEDURE — 80053 COMPREHEN METABOLIC PANEL: CPT

## 2019-01-30 PROCEDURE — 25000003 PHARM REV CODE 250: Performed by: HOSPITALIST

## 2019-01-30 PROCEDURE — G0378 HOSPITAL OBSERVATION PER HR: HCPCS

## 2019-01-30 PROCEDURE — 25000003 PHARM REV CODE 250: Performed by: NURSE PRACTITIONER

## 2019-01-30 PROCEDURE — 96372 THER/PROPH/DIAG INJ SC/IM: CPT

## 2019-01-30 PROCEDURE — 63600175 PHARM REV CODE 636 W HCPCS: Performed by: NURSE PRACTITIONER

## 2019-01-30 PROCEDURE — 96366 THER/PROPH/DIAG IV INF ADDON: CPT

## 2019-01-30 PROCEDURE — 96361 HYDRATE IV INFUSION ADD-ON: CPT

## 2019-01-30 PROCEDURE — 36415 COLL VENOUS BLD VENIPUNCTURE: CPT

## 2019-01-30 PROCEDURE — S0030 INJECTION, METRONIDAZOLE: HCPCS | Performed by: NURSE PRACTITIONER

## 2019-01-30 PROCEDURE — 25000003 PHARM REV CODE 250: Performed by: INTERNAL MEDICINE

## 2019-01-30 PROCEDURE — 99214 PR OFFICE/OUTPT VISIT, EST, LEVL IV, 30-39 MIN: ICD-10-PCS | Mod: ,,, | Performed by: INTERNAL MEDICINE

## 2019-01-30 PROCEDURE — 25500020 PHARM REV CODE 255

## 2019-01-30 PROCEDURE — 85025 COMPLETE CBC W/AUTO DIFF WBC: CPT

## 2019-01-30 RX ORDER — LIDOCAINE HYDROCHLORIDE 20 MG/ML
JELLY TOPICAL
Status: DISCONTINUED | OUTPATIENT
Start: 2019-01-30 | End: 2019-02-01 | Stop reason: HOSPADM

## 2019-01-30 RX ORDER — TRAMADOL HYDROCHLORIDE 50 MG/1
50 TABLET ORAL EVERY 6 HOURS PRN
Status: DISCONTINUED | OUTPATIENT
Start: 2019-01-30 | End: 2019-02-01 | Stop reason: HOSPADM

## 2019-01-30 RX ORDER — HYDROMORPHONE HYDROCHLORIDE 2 MG/ML
0.5 INJECTION, SOLUTION INTRAMUSCULAR; INTRAVENOUS; SUBCUTANEOUS EVERY 6 HOURS PRN
Status: DISCONTINUED | OUTPATIENT
Start: 2019-01-30 | End: 2019-01-31

## 2019-01-30 RX ORDER — HYDROCORTISONE 25 MG/G
CREAM TOPICAL 2 TIMES DAILY
Status: DISCONTINUED | OUTPATIENT
Start: 2019-01-30 | End: 2019-02-01 | Stop reason: HOSPADM

## 2019-01-30 RX ADMIN — PANTOPRAZOLE SODIUM 40 MG: 40 TABLET, DELAYED RELEASE ORAL at 09:01

## 2019-01-30 RX ADMIN — LOSARTAN POTASSIUM 100 MG: 25 TABLET, FILM COATED ORAL at 09:01

## 2019-01-30 RX ADMIN — METRONIDAZOLE 500 MG: 500 INJECTION, SOLUTION INTRAVENOUS at 12:01

## 2019-01-30 RX ADMIN — CIPROFLOXACIN 400 MG: 2 INJECTION, SOLUTION INTRAVENOUS at 08:01

## 2019-01-30 RX ADMIN — METRONIDAZOLE 500 MG: 500 INJECTION, SOLUTION INTRAVENOUS at 04:01

## 2019-01-30 RX ADMIN — MAGNESIUM CITRATE: 1.75 LIQUID ORAL at 08:01

## 2019-01-30 RX ADMIN — METRONIDAZOLE 500 MG: 500 INJECTION, SOLUTION INTRAVENOUS at 08:01

## 2019-01-30 RX ADMIN — CIPROFLOXACIN 400 MG: 2 INJECTION, SOLUTION INTRAVENOUS at 09:01

## 2019-01-30 RX ADMIN — HYDROCORTISONE 2.5%: 25 CREAM TOPICAL at 08:01

## 2019-01-30 RX ADMIN — HYDROCORTISONE 2.5%: 25 CREAM TOPICAL at 04:01

## 2019-01-30 RX ADMIN — SODIUM CHLORIDE: 0.9 INJECTION, SOLUTION INTRAVENOUS at 05:01

## 2019-01-30 RX ADMIN — POLYETHYLENE GLYCOL-3350 AND ELECTROLYTES WITH FLAVOR PACK 4000 ML: 240; 5.84; 2.98; 6.72; 22.72 POWDER, FOR SOLUTION ORAL at 08:01

## 2019-01-30 RX ADMIN — STANDARDIZED SENNA CONCENTRATE AND DOCUSATE SODIUM 1 TABLET: 8.6; 5 TABLET, FILM COATED ORAL at 08:01

## 2019-01-30 RX ADMIN — ENOXAPARIN SODIUM 40 MG: 100 INJECTION SUBCUTANEOUS at 04:01

## 2019-01-30 RX ADMIN — ACETAMINOPHEN 1000 MG: 500 TABLET, COATED ORAL at 05:01

## 2019-01-30 RX ADMIN — STANDARDIZED SENNA CONCENTRATE AND DOCUSATE SODIUM 1 TABLET: 8.6; 5 TABLET, FILM COATED ORAL at 09:01

## 2019-01-30 RX ADMIN — POLYETHYLENE GLYCOL 3350 17 G: 17 POWDER, FOR SOLUTION ORAL at 09:01

## 2019-01-30 RX ADMIN — LIDOCAINE HYDROCHLORIDE: 20 JELLY TOPICAL at 04:01

## 2019-01-30 RX ADMIN — DIATRIZOATE MEGLUMINE AND DIATRIZOATE SODIUM 120 ML: 600; 100 SOLUTION ORAL; RECTAL at 03:01

## 2019-01-30 NOTE — CONSULTS
"[facility]      CC: Fecal impaction    HPI 63 y.o. male with fecal impaction, large, severe, associated with lower abdominal pain/rectal pain/rectal pressure, recent onset, with no alleviating factors, exacerbated by recent imodium use for "loose stool".  He has a history of MS and is largely bedbound.  He has never had colonoscopy.  He denies blood in stool.  Of note, he states that only the floor nurse did a rectal exam on him since admission and that digital exam and/or digital disimpaction were not attempted in the ER or by the primary team.  He states that he has never had colonoscopy because of his issues with being able to prep at home.  Imaging was reviewed and showed severe fecal impaction.  He denies any upper GI complaints.      Past Medical History:   Diagnosis Date    Acute urinary retention     Acute UTI     treated twice in past three weeks with antibx per patient-since indwelling catheter    Back problem     herniated disc-lumbar region    GERD (gastroesophageal reflux disease)     Hypertension     130s/80s    MS (multiple sclerosis)     Neck stiffness     Neurogenic bladder     has indwelling catheter now    Sleep disturbance     Snoring     Visual impairment        Past Surgical History:   Procedure Laterality Date    CYSTOSCOPY N/A 4/19/2016    Performed by Darline Delcid MD at [facility] OR 1ST FLR    CYSTOSCOPY N/A 5/6/2014    Performed by Darline Delcid MD at [facility] OR 1ST FLR    DISKECTOMY AND FUSION-ANTERIOR CERVICAL (ACDF)  C3/4 ACDF N/A 8/15/2016    Performed by Shant Chau MD at [facility] OR    FINGER SURGERY      INJECTION, BOTULINUM TOXIN, TYPE A N/A 5/6/2014    Performed by Darline Delcid MD at [facility] OR 1ST FLR    INJECTION-BOTOX N/A 4/19/2016    Performed by [facility]line Delcid MD at [facility] OR 1ST FLR    LASER OF PROSTATE W/ GREEN LIGHT PVP      RECTAL BOTOX INJECTION      SPINAL FUSION      URODYNAMIC STUDY, FLUOROSCOPIC N/A 3/12/2014    Performed by " "[facility]line [facility]cid MD at [facility] OR [address] [place] [address]       Social History     Tobacco Use    Smoking status: Never Smoker    Smokeless tobacco: Never Used   Substance Use Topics    Alcohol use: Yes     Comment: occasionally drinks    Drug use: No       Family History   Problem Relation Age of Onset    Dementia Mother     Parkinsonism Father     No Known Problems Sister     Hypertension Brother     Anesthesia problems Neg Hx        Review of Systems  General ROS: negative for - chills, fever or weight loss  Psychological ROS: negative for - hallucination, depression or suicidal ideation  Ophthalmic ROS: negative for - blurry vision, photophobia or eye pain  ENT ROS: negative for - epistaxis, sore throat or rhinorrhea  Respiratory ROS: no cough, shortness of breath, or wheezing  Cardiovascular ROS: no chest pain or dyspnea on exertion  Gastrointestinal ROS: + rectal /abdominal pain  Genito-Urinary ROS: no dysuria, trouble voiding, or hematuria  Musculoskeletal ROS: negative for - arthralgia, myalgia, weakness  Neurological ROS: no syncope or seizures; no ataxia + MS  Dermatological ROS: negative for pruritis, rash and jaundice    Physical Examination  BP (!) 147/84 (Patient Position: Lying)   Pulse 86   Temp 98.6 °F (37 °C) (Oral)   Resp 17   Ht 5' 8" (1.727 m)   Wt 76.2 kg (168 lb)   SpO2 96%   BMI 25.54 kg/m²   General appearance: alert, cooperative, no distress  HENT: Normocephalic, atraumatic, neck symmetrical, no nasal discharge   Eyes: conjunctivae/corneas clear, PERRL, EOM's intact, sclera anicteric  Lungs: clear to auscultation bilaterally, no dullness to percussion bilaterally, symmetric expansion, breathing unlabored  Heart: regular rate and rhythm without rub; no displacement of the PMI   Abdomen: + diffuse tenderness.  Rectal exam with copious pasty stool in the vault but no hard impaction.  No blood.  No masses. No abnormal sphincter tone.  + sacral erythema but no obvious skin disruption  Extremities: " extremities symmetric; no clubbing, cyanosis, or edema  Integument: Skin color, texture, turgor normal; no rashes; hair distrubution normal, no jaundice  Neurologic: Alert and oriented X 3, + LE weakness secondary to MS.  Psychiatric: no pressured speech; normal affect; no evidence of impaired cognition, no anxiety/depression     Labs:  Lab Results   Component Value Date    WBC 9.40 01/29/2019    HGB 14.5 01/29/2019    HCT 44.1 01/29/2019    MCV 91 01/29/2019     01/29/2019       CMP  Sodium   Date Value Ref Range Status   01/29/2019 140 136 - 145 mmol/L Final     Potassium   Date Value Ref Range Status   01/29/2019 3.7 3.5 - 5.1 mmol/L Final     Chloride   Date Value Ref Range Status   01/29/2019 109 95 - 110 mmol/L Final     CO2   Date Value Ref Range Status   01/29/2019 23 23 - 29 mmol/L Final     Glucose   Date Value Ref Range Status   01/29/2019 110 70 - 110 mg/dL Final     BUN, Bld   Date Value Ref Range Status   01/29/2019 13 8 - 23 mg/dL Final     Creatinine   Date Value Ref Range Status   01/29/2019 0.8 0.5 - 1.4 mg/dL Final   11/03/2012 0.9 0.5 - 1.4 mg/dL Final     Calcium   Date Value Ref Range Status   01/29/2019 9.5 8.7 - 10.5 mg/dL Final   11/03/2012 9.8 8.7 - 10.5 mg/dL Final     Total Protein   Date Value Ref Range Status   01/29/2019 6.1 6.0 - 8.4 g/dL Final     Albumin   Date Value Ref Range Status   01/29/2019 3.5 3.5 - 5.2 g/dL Final     Total Bilirubin   Date Value Ref Range Status   01/29/2019 0.8 0.1 - 1.0 mg/dL Final     Comment:     For infants and newborns, interpretation of results should be based  on gestational age, weight and in agreement with clinical  observations.  Premature Infant recommended reference ranges:  Up to 24 hours.............<8.0 mg/dL  Up to 48 hours............<12.0 mg/dL  3-5 days..................<15.0 mg/dL  6-29 days.................<15.0 mg/dL       Alkaline Phosphatase   Date Value Ref Range Status   01/29/2019 90 55 - 135 U/L Final     AST   Date Value  Ref Range Status   01/29/2019 31 10 - 40 U/L Final     ALT   Date Value Ref Range Status   01/29/2019 42 10 - 44 U/L Final     Anion Gap   Date Value Ref Range Status   01/29/2019 8 8 - 16 mmol/L Final   11/03/2012 13 5 - 15 meq/L Final     eGFR if    Date Value Ref Range Status   01/29/2019 >60 >60 mL/min/1.73 m^2 Final     eGFR if non    Date Value Ref Range Status   01/29/2019 >60 >60 mL/min/1.73 m^2 Final     Comment:     Calculation used to obtain the estimated glomerular filtration  rate (eGFR) is the CKD-EPI equation.            Imaging:  CT scan was independently visualized and reviewed by me and showed large stool impaction.    I have personally reviewed these images    Case discussed with Dr. Ma who relates that digital disimpaction was attempted.  Further history from the nurses reveals that only nursing did rectal exam.        Assessment:   1.  MS  2.  Immobility secondary to above  3.  Abnormal CT scan  4.  Fecal impaction    Plan:  1.  Miralax BID  2.  Continuous soap suds enemas to facilitate evacuation  3.  Mineral oil enema x 1.    4.  KUB in AM  5.  Avoid all narcotics and antimotility agents.  6.  Consider dulcolax suppository in AM  7.  Once disimpacted, will likely prep for inpatient colonoscopy to evaluate for stercoral colitis and to rule out mass.  8.  Further recommendations to follow after above.  9.  Communication will be sent to the referring provider, Dr. Ma regarding my assessment and plan on this patient via EPIC.      Luis Garcia MD  [facility]  [address], [address]  [place] [address]  Office: [phone]  Fax: [phone]

## 2019-01-30 NOTE — SUBJECTIVE & OBJECTIVE
Interval History:   Pt seen/examined-multiple soap suds enemas overrnight with minimal results .   Nurse reports multiple attempts yesterday to disimpact-pt reports very painful   No abdominal pain today .   Tolerating clear liquids     Review of Systems   Constitutional: Negative for activity change, chills, fatigue and fever.   HENT: Negative for postnasal drip and sore throat.    Respiratory: Negative for cough, shortness of breath and wheezing.    Cardiovascular: Negative for chest pain, palpitations and leg swelling.   Gastrointestinal: Positive for abdominal pain and constipation. Negative for abdominal distention, blood in stool, diarrhea, nausea and vomiting.   Genitourinary: Positive for difficulty urinating. Negative for dysuria, flank pain, hematuria and urgency.   Musculoskeletal: Negative for arthralgias and myalgias.   Skin: Negative for color change.   Neurological: Positive for weakness (chronic, unchanged). Negative for dizziness and light-headedness.        MS-wheel chair bound    Psychiatric/Behavioral: Negative for confusion. The patient is not nervous/anxious.      Objective:     Vital Signs (Most Recent):  Temp: 97.6 °F (36.4 °C) (01/30/19 1045)  Pulse: 88 (01/30/19 1045)  Resp: 20 (01/30/19 1045)  BP: 123/61 (01/30/19 1045)  SpO2: 96 % (01/30/19 1045) Vital Signs (24h Range):  Temp:  [96.9 °F (36.1 °C)-98.6 °F (37 °C)] 97.6 °F (36.4 °C)  Pulse:  [] 88  Resp:  [16-20] 20  SpO2:  [90 %-96 %] 96 %  BP: (123-160)/(61-84) 123/61     Weight: 77.1 kg (170 lb)  Body mass index is 25.85 kg/m².    Intake/Output Summary (Last 24 hours) at 1/30/2019 1431  Last data filed at 1/30/2019 0600  Gross per 24 hour   Intake 4220 ml   Output --   Net 4220 ml      Physical Exam   Constitutional: He is oriented to person, place, and time. He appears well-developed and well-nourished. No distress.   HENT:   Head: Normocephalic and atraumatic.   Nose: Nose normal.   Mouth/Throat: Oropharynx is clear and moist.  No oropharyngeal exudate.   Eyes: Conjunctivae are normal. No scleral icterus.   Neck: Neck supple. No thyromegaly present.   Cardiovascular: Normal rate, regular rhythm, normal heart sounds and intact distal pulses.   Pulmonary/Chest: Effort normal and breath sounds normal. No respiratory distress.   Abdominal: Soft. Bowel sounds are normal. He exhibits no distension. There is no tenderness.   Genitourinary:   Genitourinary Comments: Deferred-multiple enemas/attempts to disimpact    Musculoskeletal: He exhibits no edema or tenderness.   RIGHT arm contracted; minimal movement BLEs (Chronic)     Neurological: He is alert and oriented to person, place, and time. No cranial nerve deficit.   Skin: Skin is warm and dry. Capillary refill takes less than 2 seconds. No erythema.   Sacral wound-- seems to be well-healing.  See photo.     Psychiatric: He has a normal mood and affect. His behavior is normal. Judgment and thought content normal.   Nursing note and vitals reviewed.      Significant Labs:   BMP:   Recent Labs   Lab 01/30/19  0425   GLU 83      K 3.5      CO2 22*   BUN 8   CREATININE 0.8   CALCIUM 8.4*     CBC:   Recent Labs   Lab 01/28/19  1705 01/29/19  0419 01/30/19  0425   WBC 15.90* 9.40 8.50   HGB 16.4 14.5 13.7*   HCT 50.3 44.1 41.4    224 204     TSH:   Recent Labs   Lab 01/29/19  0419   TSH 2.948       Significant Imaging: I have reviewed and interpreted all pertinent imaging results/findings within the past 24 hours.   Very large area impaction rectosigmoid area

## 2019-01-30 NOTE — ASSESSMENT & PLAN NOTE
Urinating okay with reported incomplete emptying.--  check PVR and consult urology as indicated -especially in light of infection     If issues with urinary retention may require straight cath.Chronic due to MS

## 2019-01-30 NOTE — HOSPITAL COURSE
Michael German is a 62 yo male with PMHx significant for MS who was admitted with severe constipation and rectal impaction.    He was treated with laxatives, enemas and manual disimpaction. He had a colonoscopy yesterday afternoon which did not reveal any abnormalities.    He feels much better and is tolerating food. He will be d/c'd on Miralax BID and lactulose PRN plus a low residue diet.    The pt was treated for a UTI with Keflex.

## 2019-01-30 NOTE — PROGRESS NOTES
[facility] Hospital Medicine  Progress Note    Patient Name: Michael German  MRN: [id number]  Patient Class: OP- Observation   Admission Date: 1/28/2019  Length of Stay: 0 days  Attending Physician: Carlie Ma MD  Primary Care Provider: Av Zaragoza MD        Subjective:     Principal Problem:Colitis, regional, unspecified complication    HPI:  Michael German is a 64 yo male with PMHx significant for MS, chronic bowel issues fluctuating between constipation and diarrhea, and HTN.  He presented to the ED with c/o 8/10 lower abdominal pain with radiation to his rectum.  He reports no BM x 4 days and prior to that had been only having watery stool so he began taking imodium.  He stopped the imodium once he had stopped having BMs.  He denies trying anything at home for constipation. He denies any N/V, fever/chills, CP, or SOB. He reports some recent difficulty emptying his bladder 2/2 constipation. He is wheelchair bound with MS for which he has failed all attempted treatment regimens. He was given a fleets enema in ED and has has some liquid stool since, but continues with rectal pressure and pain rated 3/10 at present.  CT with large stool burden.  Other pertinent medical history as below:    Hospital Course:  Michael German is a 64 yo male with PMHx significant for MS, chronic bowel issues fluctuating between constipation and diarrhea, and HTN.  He presented to the ED with c/o 8/10 lower abdominal pain with radiation to his rectum. Gi Dr Burdick consulted and recommendations as follows:  Admitted with Stercoral colitis=in MS patient who had taken imodium. Clearance of fecal ball unsuccessful after brown bomb, multiple attempts at disimpaction and multiple soap suds enema. For gastrograffin then bowel prep for colonoscopy-has never had as was unable to prep outpatient.   Cipro/flagyl started.   Consult to Dr Marie-on call colorectal surgereon -made aware in case any possible  complications during procedures since colon is so dilated and thickened.     Interval History:   Pt seen/examined-multiple soap suds enemas overrnight with minimal results .   Nurse reports multiple attempts yesterday to disimpact-pt reports very painful   No abdominal pain today .   Tolerating clear liquids     Review of Systems   Constitutional: Negative for activity change, chills, fatigue and fever.   HENT: Negative for postnasal drip and sore throat.    Respiratory: Negative for cough, shortness of breath and wheezing.    Cardiovascular: Negative for chest pain, palpitations and leg swelling.   Gastrointestinal: Positive for abdominal pain and constipation. Negative for abdominal distention, blood in stool, diarrhea, nausea and vomiting.   Genitourinary: Positive for difficulty urinating. Negative for dysuria, flank pain, hematuria and urgency.   Musculoskeletal: Negative for arthralgias and myalgias.   Skin: Negative for color change.   Neurological: Positive for weakness (chronic, unchanged). Negative for dizziness and light-headedness.        MS-wheel chair bound    Psychiatric/Behavioral: Negative for confusion. The patient is not nervous/anxious.      Objective:     Vital Signs (Most Recent):  Temp: 97.6 °F (36.4 °C) (01/30/19 1045)  Pulse: 88 (01/30/19 1045)  Resp: 20 (01/30/19 1045)  BP: 123/61 (01/30/19 1045)  SpO2: 96 % (01/30/19 1045) Vital Signs (24h Range):  Temp:  [96.9 °F (36.1 °C)-98.6 °F (37 °C)] 97.6 °F (36.4 °C)  Pulse:  [] 88  Resp:  [16-20] 20  SpO2:  [90 %-96 %] 96 %  BP: (123-160)/(61-84) 123/61     Weight: 77.1 kg (170 lb)  Body mass index is 25.85 kg/m².    Intake/Output Summary (Last 24 hours) at 1/30/2019 1431  Last data filed at 1/30/2019 0600  Gross per 24 hour   Intake 4220 ml   Output --   Net 4220 ml      Physical Exam   Constitutional: He is oriented to person, place, and time. He appears well-developed and well-nourished. No distress.   HENT:   Head: Normocephalic and  atraumatic.   Nose: Nose normal.   Mouth/Throat: Oropharynx is clear and moist. No oropharyngeal exudate.   Eyes: Conjunctivae are normal. No scleral icterus.   Neck: Neck supple. No thyromegaly present.   Cardiovascular: Normal rate, regular rhythm, normal heart sounds and intact distal pulses.   Pulmonary/Chest: Effort normal and breath sounds normal. No respiratory distress.   Abdominal: Soft. Bowel sounds are normal. He exhibits no distension. There is no tenderness.   Genitourinary:   Genitourinary Comments: Deferred-multiple enemas/attempts to disimpact    Musculoskeletal: He exhibits no edema or tenderness.   RIGHT arm contracted; minimal movement BLEs (Chronic)     Neurological: He is alert and oriented to person, place, and time. No cranial nerve deficit.   Skin: Skin is warm and dry. Capillary refill takes less than 2 seconds. No erythema.   Sacral wound-- seems to be well-healing.  See photo.     Psychiatric: He has a normal mood and affect. His behavior is normal. Judgment and thought content normal.   Nursing note and vitals reviewed.      Significant Labs:   BMP:   Recent Labs   Lab 01/30/19  0425   GLU 83      K 3.5      CO2 22*   BUN 8   CREATININE 0.8   CALCIUM 8.4*     CBC:   Recent Labs   Lab 01/28/19  1705 01/29/19  0419 01/30/19  0425   WBC 15.90* 9.40 8.50   HGB 16.4 14.5 13.7*   HCT 50.3 44.1 41.4    224 204     TSH:   Recent Labs   Lab 01/29/19  0419   TSH 2.948       Significant Imaging: I have reviewed and interpreted all pertinent imaging results/findings within the past 24 hours.   Very large area impaction rectosigmoid area     Assessment/Plan:      * Colitis, regional, unspecified complication    Stercoral colitis in constipated patient.  No response to fleet's enema.  Significant pain. Improved after  brown bomb enema to facilitate bowel clearance and then institute disimpaction of stool ball per GI recommendations-however disimpaction unsuccessful. gastrograffin  enema ordered as discussed with GI   THen golytely prep and colonoscopy in am .   TSH wnl    .Continue  abx -- colitis is from constipation and we will treat cause.  Will continue cipro/flagyl until seen by GI. Hydrate intravenously and monitor closely for evidence of ischemia/bleeding -Consult general surgery if any issues arise.      Constipation    Acute, severe enough  To cause colitis-; will check TSH and initiate bowel regimen per GI recommendations  With resultant colitis, severe pain.  Enema for bowel clearance and initiation of daily bowel regimen: miralax, senna to start.        UTI (urinary tract infection)    Continue cipro.   Aerococcus on w urine culture -no susceptibility routinely performed  Will recheck ua for clearance          GERD (gastroesophageal reflux disease)    Chronic issue, utilize TUMS PRN as per home use.       Hypertension    Chronic, stable.  Continue losartan, monitor BP closely, and titrate medication as required for sustained BP control and avoid hypotension  Hypertension Medications             losartan (COZAAR) 100 MG tablet Take 100 mg by mouth once daily.      Hospital Medications             losartan tablet 100 mg Take 4 tablets (100 mg total) by mouth once daily.             Neurogenic bladder    Urinating okay with reported incomplete emptying.--  check PVR and consult urology as indicated -especially in light of infection     If issues with urinary retention may require straight cath.Chronic due to MS        MS (multiple sclerosis)    Failed outpatient medications; follows with Dr. Sanchez.  Has home PT/OT.  Consult while here an continue f/u outpatient.          VTE Risk Mitigation (From admission, onward)        Ordered     enoxaparin injection 40 mg  Daily      01/28/19 2309     IP VTE HIGH RISK PATIENT  Once      01/28/19 2309        Discussed progress, results and plan of care with   Wife/patient and RN  And Dr Burdick-  Consult to Dr Marie-on call colorectal surgereon  -made aware in case any possible complications during procedures since colon is so dilated and thickened.         Carlie Ma MD  Department of Hospital Medicine   [facility]

## 2019-01-30 NOTE — ASSESSMENT & PLAN NOTE
Continue cipro.   Aerococcus on w urine culture -no susceptibility routinely performed  Will recheck ua for clearance

## 2019-01-30 NOTE — ASSESSMENT & PLAN NOTE
Stercoral colitis in constipated patient.  No response to fleet's enema.  Significant pain. Improved after  brown bomb enema to facilitate bowel clearance and then institute disimpaction of stool ball per GI recommendations-however disimpaction unsuccessful. gastrograffin enema ordered as discussed with GI   THen golytely prep and colonoscopy in am .   TSH wnl    .Continue  abx -- colitis is from constipation and we will treat cause.  Will continue cipro/flagyl until seen by GI. Hydrate intravenously and monitor closely for evidence of ischemia/bleeding -Consult general surgery if any issues arise.

## 2019-01-30 NOTE — PLAN OF CARE
Problem: Adult Inpatient Plan of Care  Goal: Plan of Care Review  Outcome: Ongoing (interventions implemented as appropriate)  Pt remained free from injury/falls this shift. VSS- no signs of any distress. POC reviewed with pt. Pt repositioned with assist Q2HR and PRN. Incontinent care provided PRN throughout shift. No complaints of pain this shift.  Bed locked in lowest position, SR upx2, call light within reach. Will continue to monitor.

## 2019-01-31 ENCOUNTER — ANESTHESIA EVENT (OUTPATIENT)
Dept: ENDOSCOPY | Facility: HOSPITAL | Age: 64
End: 2019-01-31
Payer: MEDICARE

## 2019-01-31 ENCOUNTER — ANESTHESIA (OUTPATIENT)
Dept: ENDOSCOPY | Facility: HOSPITAL | Age: 64
End: 2019-01-31
Payer: MEDICARE

## 2019-01-31 LAB
ALBUMIN SERPL BCP-MCNC: 3.4 G/DL
ALP SERPL-CCNC: 79 U/L
ALT SERPL W/O P-5'-P-CCNC: 43 U/L
ANION GAP SERPL CALC-SCNC: 11 MMOL/L
AST SERPL-CCNC: 47 U/L
BASOPHILS # BLD AUTO: 0 K/UL
BASOPHILS NFR BLD: 0.7 %
BILIRUB SERPL-MCNC: 0.7 MG/DL
BUN SERPL-MCNC: 7 MG/DL
CALCIUM SERPL-MCNC: 8.7 MG/DL
CHLORIDE SERPL-SCNC: 107 MMOL/L
CO2 SERPL-SCNC: 23 MMOL/L
CREAT SERPL-MCNC: 0.8 MG/DL
DIFFERENTIAL METHOD: ABNORMAL
EOSINOPHIL # BLD AUTO: 0.1 K/UL
EOSINOPHIL NFR BLD: 1.6 %
ERYTHROCYTE [DISTWIDTH] IN BLOOD BY AUTOMATED COUNT: 13.3 %
EST. GFR  (AFRICAN AMERICAN): >60 ML/MIN/1.73 M^2
EST. GFR  (NON AFRICAN AMERICAN): >60 ML/MIN/1.73 M^2
GLUCOSE SERPL-MCNC: 88 MG/DL
HCT VFR BLD AUTO: 41.7 %
HGB BLD-MCNC: 13.9 G/DL
LYMPHOCYTES # BLD AUTO: 0.9 K/UL
LYMPHOCYTES NFR BLD: 11.6 %
MCH RBC QN AUTO: 30 PG
MCHC RBC AUTO-ENTMCNC: 33.4 G/DL
MCV RBC AUTO: 90 FL
MONOCYTES # BLD AUTO: 0.8 K/UL
MONOCYTES NFR BLD: 10.1 %
NEUTROPHILS # BLD AUTO: 5.7 K/UL
NEUTROPHILS NFR BLD: 76 %
PLATELET # BLD AUTO: 209 K/UL
PMV BLD AUTO: 8.4 FL
POTASSIUM SERPL-SCNC: 3.4 MMOL/L
PROT SERPL-MCNC: 6.1 G/DL
RBC # BLD AUTO: 4.64 M/UL
SODIUM SERPL-SCNC: 141 MMOL/L
WBC # BLD AUTO: 7.5 K/UL

## 2019-01-31 PROCEDURE — D9220A PRA ANESTHESIA: ICD-10-PCS | Mod: CRNA,,, | Performed by: NURSE ANESTHETIST, CERTIFIED REGISTERED

## 2019-01-31 PROCEDURE — 96361 HYDRATE IV INFUSION ADD-ON: CPT

## 2019-01-31 PROCEDURE — 25000003 PHARM REV CODE 250: Performed by: HOSPITALIST

## 2019-01-31 PROCEDURE — 85025 COMPLETE CBC W/AUTO DIFF WBC: CPT

## 2019-01-31 PROCEDURE — D9220A PRA ANESTHESIA: Mod: CRNA,,, | Performed by: NURSE ANESTHETIST, CERTIFIED REGISTERED

## 2019-01-31 PROCEDURE — 45385 PR COLONOSCOPY,REMV LESN,SNARE: ICD-10-PCS | Mod: 22,,, | Performed by: INTERNAL MEDICINE

## 2019-01-31 PROCEDURE — G0378 HOSPITAL OBSERVATION PER HR: HCPCS

## 2019-01-31 PROCEDURE — 27201089 HC SNARE, DISP (ANY): Performed by: INTERNAL MEDICINE

## 2019-01-31 PROCEDURE — 88305 TISSUE EXAM BY PATHOLOGIST: CPT | Performed by: PATHOLOGY

## 2019-01-31 PROCEDURE — D9220A PRA ANESTHESIA: Mod: ANES,,, | Performed by: ANESTHESIOLOGY

## 2019-01-31 PROCEDURE — 25000003 PHARM REV CODE 250: Performed by: SURGERY

## 2019-01-31 PROCEDURE — D9220A PRA ANESTHESIA: ICD-10-PCS | Mod: ANES,,, | Performed by: ANESTHESIOLOGY

## 2019-01-31 PROCEDURE — 45380 PR COLONOSCOPY,BIOPSY: ICD-10-PCS | Mod: 22,59,, | Performed by: INTERNAL MEDICINE

## 2019-01-31 PROCEDURE — 45380 COLONOSCOPY AND BIOPSY: CPT | Mod: 22,59,, | Performed by: INTERNAL MEDICINE

## 2019-01-31 PROCEDURE — 27201012 HC FORCEPS, HOT/COLD, DISP: Performed by: INTERNAL MEDICINE

## 2019-01-31 PROCEDURE — 99203 OFFICE O/P NEW LOW 30 MIN: CPT | Mod: ,,, | Performed by: SURGERY

## 2019-01-31 PROCEDURE — 36415 COLL VENOUS BLD VENIPUNCTURE: CPT

## 2019-01-31 PROCEDURE — 63600175 PHARM REV CODE 636 W HCPCS: Performed by: NURSE PRACTITIONER

## 2019-01-31 PROCEDURE — 80053 COMPREHEN METABOLIC PANEL: CPT

## 2019-01-31 PROCEDURE — 99203 PR OFFICE/OUTPT VISIT, NEW, LEVL III, 30-44 MIN: ICD-10-PCS | Mod: ,,, | Performed by: SURGERY

## 2019-01-31 PROCEDURE — S0030 INJECTION, METRONIDAZOLE: HCPCS | Performed by: NURSE PRACTITIONER

## 2019-01-31 PROCEDURE — 96372 THER/PROPH/DIAG INJ SC/IM: CPT

## 2019-01-31 PROCEDURE — 25000003 PHARM REV CODE 250: Performed by: NURSE PRACTITIONER

## 2019-01-31 PROCEDURE — 45385 COLONOSCOPY W/LESION REMOVAL: CPT | Performed by: INTERNAL MEDICINE

## 2019-01-31 PROCEDURE — 45380 COLONOSCOPY AND BIOPSY: CPT | Performed by: INTERNAL MEDICINE

## 2019-01-31 PROCEDURE — 96366 THER/PROPH/DIAG IV INF ADDON: CPT

## 2019-01-31 PROCEDURE — 45385 COLONOSCOPY W/LESION REMOVAL: CPT | Mod: 22,,, | Performed by: INTERNAL MEDICINE

## 2019-01-31 PROCEDURE — 88305 TISSUE SPECIMEN TO PATHOLOGY - SURGERY: ICD-10-PCS | Mod: 26,,, | Performed by: PATHOLOGY

## 2019-01-31 PROCEDURE — 37000008 HC ANESTHESIA 1ST 15 MINUTES: Performed by: INTERNAL MEDICINE

## 2019-01-31 PROCEDURE — 63600175 PHARM REV CODE 636 W HCPCS: Performed by: NURSE ANESTHETIST, CERTIFIED REGISTERED

## 2019-01-31 PROCEDURE — 96376 TX/PRO/DX INJ SAME DRUG ADON: CPT

## 2019-01-31 PROCEDURE — 25000003 PHARM REV CODE 250: Performed by: INTERNAL MEDICINE

## 2019-01-31 PROCEDURE — 37000009 HC ANESTHESIA EA ADD 15 MINS: Performed by: INTERNAL MEDICINE

## 2019-01-31 RX ORDER — PROPOFOL 10 MG/ML
INJECTION, EMULSION INTRAVENOUS
Status: COMPLETED
Start: 2019-01-31 | End: 2019-01-31

## 2019-01-31 RX ORDER — LIDOCAINE HYDROCHLORIDE 20 MG/ML
INJECTION, SOLUTION EPIDURAL; INFILTRATION; INTRACAUDAL; PERINEURAL
Status: DISCONTINUED
Start: 2019-01-31 | End: 2019-02-01 | Stop reason: HOSPADM

## 2019-01-31 RX ORDER — POTASSIUM CHLORIDE 20 MEQ/1
40 TABLET, EXTENDED RELEASE ORAL ONCE
Status: COMPLETED | OUTPATIENT
Start: 2019-01-31 | End: 2019-01-31

## 2019-01-31 RX ORDER — POLYETHYLENE GLYCOL 3350 17 G/17G
17 POWDER, FOR SOLUTION ORAL 2 TIMES DAILY
Status: DISCONTINUED | OUTPATIENT
Start: 2019-01-31 | End: 2019-02-01 | Stop reason: HOSPADM

## 2019-01-31 RX ORDER — CEPHALEXIN 250 MG/1
500 CAPSULE ORAL EVERY 6 HOURS
Status: DISCONTINUED | OUTPATIENT
Start: 2019-01-31 | End: 2019-02-01 | Stop reason: HOSPADM

## 2019-01-31 RX ORDER — LIDOCAINE HCL/PF 100 MG/5ML
SYRINGE (ML) INTRAVENOUS
Status: DISCONTINUED | OUTPATIENT
Start: 2019-01-31 | End: 2019-01-31

## 2019-01-31 RX ORDER — PROPOFOL 10 MG/ML
VIAL (ML) INTRAVENOUS
Status: DISCONTINUED | OUTPATIENT
Start: 2019-01-31 | End: 2019-01-31

## 2019-01-31 RX ADMIN — PROPOFOL 100 MG: 10 INJECTION, EMULSION INTRAVENOUS at 02:01

## 2019-01-31 RX ADMIN — PROPOFOL 50 MG: 10 INJECTION, EMULSION INTRAVENOUS at 02:01

## 2019-01-31 RX ADMIN — CEPHALEXIN 500 MG: 250 CAPSULE ORAL at 04:01

## 2019-01-31 RX ADMIN — SODIUM CHLORIDE: 0.9 INJECTION, SOLUTION INTRAVENOUS at 02:01

## 2019-01-31 RX ADMIN — METRONIDAZOLE 500 MG: 500 INJECTION, SOLUTION INTRAVENOUS at 04:01

## 2019-01-31 RX ADMIN — ENOXAPARIN SODIUM 40 MG: 100 INJECTION SUBCUTANEOUS at 04:01

## 2019-01-31 RX ADMIN — POTASSIUM CHLORIDE 40 MEQ: 20 TABLET, EXTENDED RELEASE ORAL at 04:01

## 2019-01-31 RX ADMIN — ACETAMINOPHEN 1000 MG: 500 TABLET, COATED ORAL at 05:01

## 2019-01-31 RX ADMIN — PROPOFOL 50 MG: 10 INJECTION, EMULSION INTRAVENOUS at 03:01

## 2019-01-31 RX ADMIN — MAGNESIUM CITRATE: 1.75 LIQUID ORAL at 05:01

## 2019-01-31 RX ADMIN — LIDOCAINE HYDROCHLORIDE 100 MG: 20 INJECTION, SOLUTION INTRAVENOUS at 02:01

## 2019-01-31 RX ADMIN — CIPROFLOXACIN 400 MG: 2 INJECTION, SOLUTION INTRAVENOUS at 10:01

## 2019-01-31 RX ADMIN — SODIUM CHLORIDE: 0.9 INJECTION, SOLUTION INTRAVENOUS at 04:01

## 2019-01-31 NOTE — NURSING
Beckie from [facility] called said have pt continue to drink golytely until 0830. Pt will be taking for colonoscopy by 11Am. Beckie wants for nurse to call if BM is clear. Day shift RN notified. Call [phone].

## 2019-01-31 NOTE — ASSESSMENT & PLAN NOTE
Stercoral colitis in constipated patient.  Responding to enemas.  C-scope when constipation resolves.

## 2019-01-31 NOTE — TRANSFER OF CARE
"Anesthesia Transfer of Care Note    Patient: Michael German    Procedure(s) Performed: Procedure(s) (LRB):  COLONOSCOPY (N/A)    Patient location: GI    Anesthesia Type: general    Transport from OR: Transported from OR on room air with adequate spontaneous ventilation    Post pain: adequate analgesia    Post assessment: no apparent anesthetic complications and tolerated procedure well    Post vital signs: stable    Level of consciousness: awake, alert and oriented    Nausea/Vomiting: no nausea/vomiting    Complications: none    Transfer of care protocol was followed      Last vitals:   Visit Vitals  BP (!) 140/86 (Patient Position: Lying)   Pulse 95   Temp 35.7 °C (96.3 °F) (Oral)   Resp 18   Ht 5' 8" (1.727 m)   Wt 77.1 kg (170 lb)   SpO2 96%   BMI 25.85 kg/m²     "

## 2019-01-31 NOTE — ANESTHESIA PREPROCEDURE EVALUATION
01/31/2019  Michael German is a 63 y.o., male.    Anesthesia Evaluation    I have reviewed the Patient Summary Reports.    I have reviewed the Nursing Notes.   I have reviewed the Medications.     Review of Systems  Cardiovascular:   Hypertension    Musculoskeletal:   Arthritis   Muscle Disorders:    Neurological:  Denies Movement Disorder Dx Neuromuscular Disease, Multiple Sclerosis       Physical Exam  General:  Well nourished    Airway/Jaw/Neck:  Airway Findings: Mouth Opening: Normal Tongue: Normal  General Airway Assessment: Adult, Good  Mallampati: II  Improves to II with phonation.  TM Distance: 4-6 cm      Dental:  Dental Findings: In tact   Chest/Lungs:  Chest/Lungs Findings: Clear to auscultation, Normal Respiratory Rate     Heart/Vascular:  Heart Findings: Rate: Normal  Rhythm: Regular Rhythm  Sounds: Normal  Heart murmur: negative       Mental Status:  Mental Status Findings:  Cooperative, Alert and Oriented         Anesthesia Plan  Type of Anesthesia, risks & benefits discussed:  Anesthesia Type:  general  Patient's Preference:   Intra-op Monitoring Plan: standard ASA monitors  Intra-op Monitoring Plan Comments:   Post Op Pain Control Plan:   Post Op Pain Control Plan Comments:   Induction:   IV  Beta Blocker:  Patient is not currently on a Beta-Blocker (No further documentation required).       Informed Consent: Patient understands risks and agrees with Anesthesia plan.  Questions answered. Anesthesia consent signed with patient.  ASA Score: 3     Day of Surgery Review of History & Physical: I have interviewed and examined the patient. I have reviewed the patient's H&P dated:  There are no significant changes.  H&P update referred to the surgeon.         Ready For Surgery From Anesthesia Perspective.

## 2019-01-31 NOTE — [employer] PATIENT INSTRUCTIONS
Discharge Summary/Instructions after an Endoscopic Procedure  Patient Name: Michael German  Patient MRN: [id number]  Patient YOB: 1955 Thursday, January 31, 2019  Luis Garcia MD  RESTRICTIONS:  During your procedure today, you received medications for sedation.  These   medications may affect your judgment, balance and coordination.  Therefore,   for 24 hours, you have the following restrictions:   - DO NOT drive a car, operate machinery, make legal/financial decisions,   sign important papers or drink alcohol.    ACTIVITY:  Today: no heavy lifting, straining or running due to procedural   sedation/anesthesia.  The following day: return to full activity including work.  DIET:  Eat and drink normally unless instructed otherwise.     TREATMENT FOR COMMON SIDE EFFECTS:  - Mild abdominal pain, nausea, belching, bloating or excessive gas:  rest,   eat lightly and use a heating pad.  - Sore Throat: treat with throat lozenges and/or gargle with warm salt   water.  - Because air was used during the procedure, expelling large amounts of air   from your rectum or belching is normal.  - If a bowel prep was taken, you may not have a bowel movement for 1-3 days.    This is normal.  SYMPTOMS TO WATCH FOR AND REPORT TO YOUR PHYSICIAN:  1. Abdominal pain or bloating, other than gas cramps.  2. Chest pain.  3. Back pain.  4. Signs of infection such as: chills or fever occurring within 24 hours   after the procedure.  5. Rectal bleeding, which would show as bright red, maroon, or black stools.   (A tablespoon of blood from the rectum is not serious, especially if   hemorrhoids are present.)  6. Vomiting.  7. Weakness or dizziness.  GO DIRECTLY TO THE NEAREST EMERGENCY ROOM IF YOU HAVE ANY OF THE FOLLOWING:      Difficulty breathing              Chills and/or fever over 101 F   Persistent vomiting and/or vomiting blood   Severe abdominal pain   Severe chest pain   Black, tarry stools   Bleeding- more than one  tablespoon   Any other symptom or condition that you feel may need urgent attention  Your doctor recommends these additional instructions:  If any biopsies were taken, your doctors clinic will contact you in 1 to 2   weeks with any results.  - Return patient to [facility] for ongoing care.   - Resume previous diet.   - Continue present medications.   - Await pathology results.   - Repeat colonoscopy in 3 months because the bowel preparation was   suboptimal.  Flexible sigmoidoscopy may be adequate as proximal colon had   adequate prep.  - Miralax 1 capful (17 grams) in 8 ounces of water PO BID.  AVOID ALL   ANTIDIARRHEALS AND NARCOTICS IN THIS PATIENT.  For questions, problems or results please call your physician - Luis Garcia MD at Work:  [phone].  [facility], EMERGENCY ROOM PHONE NUMBER: [phone]  IF A COMPLICATION OR EMERGENCY SITUATION ARISES AND YOU ARE UNABLE TO REACH   YOUR PHYSICIAN - GO DIRECTLY TO THE EMERGENCY ROOM.  Luis Garcia MD  1/31/2019 3:21:54 PM  This report has been verified and signed electronically.  [employer]

## 2019-01-31 NOTE — PROGRESS NOTES
Colonoscopy done with good prep proximal to the sigmoid.  In the rectosigmoid, there was still a moderate amount of solid stool, but the patient is no longer impacted and this should pass with adequate bowel care/cleansing.  Recommend miralax BID from now on.  Low residue diet.  Mineral oil PRN.  AVOID ALL NARCOTICS AND ANTIDIARRHEALS IN THIS PATIENT.  Follow up as outpatient for consideration of repeat colonoscopy versus flex sig to examine area of the colon that could not be visualized today.  GI to sign off.  Call for questions.

## 2019-01-31 NOTE — CONSULTS
[facility]  General Surgery  Consult Note    Patient Name: Michael German  MRN: [id number]  Code Status: Full Code  Admission Date: 1/28/2019  Hospital Length of Stay: 0 days  Attending Physician: Carlie Ma MD  Primary Care Provider: Av Zaragoza MD    Patient information was obtained from patient.     Consults  Subjective:     Principal Problem: Colitis, regional, unspecified complication    History of Present Illness: 64 yo M with PMHx significant for constipation and severe MS presented to hospital with pain and discomfort in his rectal area.  He notes that he had not had a BM in 4 days at time of presentation.  He had taken immodium for loose stools.  Pt notes that he frequently has loose stools.  PT had a ct scan on admission demonstrating a large fecal impaction in upper rectum.  Pt has had multiple soap angelina enemas since admission  And a gastrograffin enema yesterday.  He notes that he has been having loose stools with these.   On exam yesterday he denies abd pain no n/v.  He reports that with his MS he has had one similar episode.  Pt is confined to wheelchair given severity of his MS>     Current Facility-Administered Medications on File Prior to Encounter   Medication    0.9%  NaCl infusion     Current Outpatient Medications on File Prior to Encounter   Medication Sig    calcium carbonate (TUMS) 200 mg calcium (500 mg) chewable tablet Take 2 tablets by mouth 3 (three) times daily as needed for Heartburn.    loperamide (IMODIUM) 2 mg capsule Take 2 mg by mouth 4 (four) times daily as needed for Diarrhea.    losartan (COZAAR) 100 MG tablet Take 100 mg by mouth once daily.    pantoprazole (PROTONIX) 40 MG tablet Take 40 mg by mouth once daily.       Review of patient's allergies indicates:  No Known Allergies    Past Medical History:   Diagnosis Date    Acute urinary retention     Acute UTI     treated twice in past three weeks with antibx per patient-since indwelling  catheter    Back problem     herniated disc-lumbar region    GERD (gastroesophageal reflux disease)     Hypertension     130s/80s    MS (multiple sclerosis)     Neck stiffness     Neurogenic bladder     has indwelling catheter now    Sleep disturbance     Snoring     Visual impairment      Past Surgical History:   Procedure Laterality Date    CYSTOSCOPY N/A 4/19/2016    [facility]ST FLR    CYSTOSCOPY N/A 5/6/2014    Per[facility] by [facility] at [facility] OR [facility]LR    DISKECTOMY AND FUSION-ANTERIOR CERVICAL (ACDF)  C3/4 ACDF N/A 8/15/2016    [facility] at [facility] OR    FINGER SURGERY      INJECTION, BOTULINUM TOXIN, TYPE A N/A 5/6/2014    Performed by [facility] at [facility] OR [facility]ST [facility]LR    INJECTION-BOTOX N/A 4/19/2016    Performed by [facility] at [facility] OR [facility]    LASER OF PROSTATE W/ GREEN LIGHT PVP      RECTAL BOTOX INJECTION      SPINAL FUSION      URODYNAMIC STUDY, FLUOROSCOPIC N/A 3/12/2014    Performed by [facility]id [facility] at [facility] OR 1ST FLR     Family History     Problem Relation (Age of Onset)    Dementia Mother    Hypertension Brother    No Known Problems Sister    Parkinsonism Father        Tobacco Use    Smoking status: Never Smoker    Smokeless tobacco: Never Used   Substance and Sexual Activity    Alcohol use: Yes     Comment: occasionally drinks    Drug use: No    Sexual activity: Not on file     Review of Systems   Constitutional: Negative for chills and unexpected weight change.   Respiratory: Negative for apnea and cough.    Cardiovascular: Negative for chest pain and palpitations.   Gastrointestinal: Positive for constipation. Negative for abdominal distention, nausea and vomiting.   Skin: Negative for color change and wound.   Hematological: Negative for adenopathy. Does not bruise/bleed easily.   Psychiatric/Behavioral: Negative for agitation and decreased concentration.     Objective:      Vital Signs (Most Recent):  Temp: 98.6 °F (37 °C) (01/31/19 0334)  Pulse: 92 (01/31/19 0334)  Resp: 18 (01/31/19 0334)  BP: 137/79 (01/31/19 0334)  SpO2: 95 % (01/31/19 0334) Vital Signs (24h Range):  Temp:  [96.8 °F (36 °C)-98.6 °F (37 °C)] 98.6 °F (37 °C)  Pulse:  [88-96] 92  Resp:  [16-20] 18  SpO2:  [90 %-96 %] 95 %  BP: (123-160)/(61-81) 137/79     Weight: 77.1 kg (170 lb)  Body mass index is 25.85 kg/m².    Physical Exam   Constitutional: No distress.   Cardiovascular: Normal rate and regular rhythm. Exam reveals no gallop.   No murmur heard.  Pulmonary/Chest: Effort normal and breath sounds normal.   Abdominal: Soft. Bowel sounds are normal. He exhibits no distension. There is no tenderness. No hernia.   Genitourinary:   Genitourinary Comments: Ext exam with no significant ext hemorrhoids.  AYDE with firm hard stool ball at about 7 cm from anal verge.   This was broken up as best I am able and minimal amount of stool returned   Skin: He is not diaphoretic.   Vitals reviewed.      Significant Labs:  CBC:   Recent Labs   Lab 01/31/19 0453   WBC 7.50   RBC 4.64   HGB 13.9*   HCT 41.7      MCV 90   MCH 30.0   MCHC 33.4     BMP:   Recent Labs   Lab 01/31/19 0453   GLU 88      K 3.4*      CO2 23   BUN 7*   CREATININE 0.8   CALCIUM 8.7       Significant Diagnostics:  Ct and gastrograffin enema reviewed.      Assessment/Plan:     Constipation    IMpaction slowly improving.  COntinue high volume enemas.  Pt to get a colonoscopy today       VTE Risk Mitigation (From admission, onward)        Ordered     enoxaparin injection 40 mg  Daily      01/28/19 2309     IP VTE HIGH RISK PATIENT  Once      01/28/19 2309      No surgical plans at present.  WIll sign off.  Please reconsult should any surgical issues arise.    Thank you for your consult. I will sign off. Please contact us if you have any additional questions.    Stanley Marie MD  General Surgery  [facility]

## 2019-01-31 NOTE — SUBJECTIVE & OBJECTIVE
Interval History: The pt feels better. He has less abdominal distention and discomfort. He is still having BM with enemas. He is voiding fine    Review of Systems   Constitutional: Negative for activity change, chills, fatigue and fever.   Respiratory: Negative for cough, shortness of breath and wheezing.    Cardiovascular: Negative for chest pain, palpitations and leg swelling.   Gastrointestinal: Positive for constipation. Negative for abdominal distention, abdominal pain, blood in stool, diarrhea, nausea and vomiting.   Genitourinary: Negative for difficulty urinating, dysuria, flank pain, hematuria and urgency.   Musculoskeletal: Negative for arthralgias and myalgias.   Skin: Positive for color change.   Neurological: Positive for weakness (chronic, unchanged). Negative for dizziness and light-headedness.        MS-wheel chair bound    Psychiatric/Behavioral: Negative for agitation and confusion. The patient is not nervous/anxious.      Objective:     Vital Signs (Most Recent):  Temp: 96.3 °F (35.7 °C) (01/31/19 0805)  Pulse: 95 (01/31/19 0805)  Resp: 18 (01/31/19 0805)  BP: (!) 140/86 (01/31/19 0805)  SpO2: 96 % (01/31/19 0805) Vital Signs (24h Range):  Temp:  [96.3 °F (35.7 °C)-98.6 °F (37 °C)] 96.3 °F (35.7 °C)  Pulse:  [92-96] 95  Resp:  [18-20] 18  SpO2:  [91 %-96 %] 96 %  BP: (137-160)/(65-86) 140/86     Weight: 77.1 kg (170 lb)  Body mass index is 25.85 kg/m².    Intake/Output Summary (Last 24 hours) at 1/31/2019 1143  Last data filed at 1/31/2019 0636  Gross per 24 hour   Intake 4116.67 ml   Output --   Net 4116.67 ml      Physical Exam   Constitutional: He is oriented to person, place, and time.   HENT:   Nose: Nose normal.   Mouth/Throat: No oropharyngeal exudate.   Eyes: No scleral icterus.   Neck: Neck supple. No thyromegaly present.   Cardiovascular: Normal rate, regular rhythm, normal heart sounds and intact distal pulses.   Pulmonary/Chest: Effort normal and breath sounds normal. No respiratory  distress.   Abdominal: Soft. Bowel sounds are normal. He exhibits no distension. There is no tenderness.   Genitourinary:   Genitourinary Comments: Deferred-multiple enemas/attempts to disimpact    Musculoskeletal: He exhibits no edema or tenderness.   RIGHT arm contracted; minimal movement BLEs (Chronic)     Neurological: He is alert and oriented to person, place, and time. No cranial nerve deficit.   Skin: Skin is warm and dry. Capillary refill takes less than 2 seconds. No erythema.   Sacral wound-- seems to be well-healing.  See photo.     Psychiatric: He has a normal mood and affect. His behavior is normal. Judgment and thought content normal.   Nursing note and vitals reviewed.      Significant Labs:   BMP:   Recent Labs   Lab 01/31/19  0453   GLU 88      K 3.4*      CO2 23   BUN 7*   CREATININE 0.8   CALCIUM 8.7     CBC:   Recent Labs   Lab 01/30/19  0425 01/31/19  0453   WBC 8.50 7.50   HGB 13.7* 13.9*   HCT 41.4 41.7    209       Significant Imaging: I have reviewed all pertinent imaging results/findings within the past 24 hours.

## 2019-01-31 NOTE — HPI
64 yo M with PMHx significant for constipation and severe MS presented to hospital with pain and discomfort in his rectal area.  He notes that he had not had a BM in 4 days at time of presentation.  He had taken immodium for loose stools.  Pt notes that he frequently has loose stools.  PT had a ct scan on admission demonstrating a large fecal impaction in upper rectum.  Pt has had multiple soap angelina enemas since admission  And a gastrograffin enema yesterday.  He notes that he has been having loose stools with these.   On exam yesterday he denies abd pain no n/v.  He reports that with his MS he has had one similar episode.  Pt is confined to wheelchair given severity of his MS>

## 2019-01-31 NOTE — PROGRESS NOTES
[facility]  Progress Note    Patient Name: Michael German  MRN: [id number]  Patient Class: OP- Observation   Admission Date: 1/28/2019  Length of Stay: 0 days  Attending Physician: Melinda Staley MD  Primary Care Provider: Av Zaragoza MD        Subjective:     Principal Problem:Colitis, regional, unspecified complication    HPI:  Michael German is a 64 yo male with PMHx significant for MS, chronic bowel issues fluctuating between constipation and diarrhea, and HTN.  He presented to the ED with c/o 8/10 lower abdominal pain with radiation to his rectum.  He reports no BM x 4 days and prior to that had been only having watery stool so he began taking imodium.  He stopped the imodium once he had stopped having BMs.  He denies trying anything at home for constipation. He denies any N/V, fever/chills, CP, or SOB. He reports some recent difficulty emptying his bladder 2/2 constipation. He is wheelchair bound with MS for which he has failed all attempted treatment regimens. He was given a fleets enema in ED and has has some liquid stool since, but continues with rectal pressure and pain rated 3/10 at present.  CT with large stool burden.  Other pertinent medical history as below:    Hospital Course:  Michael German is a 64 yo male with PMHx significant for MS, chronic bowel issues fluctuating between constipation and diarrhea, and HTN.  He presented to the ED with c/o 8/10 lower abdominal pain with radiation to his rectum. Gi Dr Burdick consulted and recommendations as follows:  Admitted with Stercoral colitis=in MS patient who had taken imodium. Clearance of fecal ball unsuccessful after brown bomb, multiple attempts at disimpaction and multiple soap suds enema. For gastrograffin then bowel prep for colonoscopy-has never had as was unable to prep outpatient.   Cipro/flagyl started.   Consult to Dr Marie-on call colorectal surgereon -made aware in case any possible  complications during procedures since colon is so dilated and thickened.     Interval History: The pt feels better. He has less abdominal distention and discomfort. He is still having BM with enemas. He is voiding fine    Review of Systems   Constitutional: Negative for activity change, chills, fatigue and fever.   Respiratory: Negative for cough, shortness of breath and wheezing.    Cardiovascular: Negative for chest pain, palpitations and leg swelling.   Gastrointestinal: Positive for constipation. Negative for abdominal distention, abdominal pain, blood in stool, diarrhea, nausea and vomiting.   Genitourinary: Negative for difficulty urinating, dysuria, flank pain, hematuria and urgency.   Musculoskeletal: Negative for arthralgias and myalgias.   Skin: No rash  Neurological: Positive for weakness (chronic, unchanged). Negative for dizziness and light-headedness.        MS-wheel chair bound    Psychiatric/Behavioral: Negative for agitation and confusion. The patient is not nervous/anxious.      Objective:     Vital Signs (Most Recent):  Temp: 96.3 °F (35.7 °C) (01/31/19 0805)  Pulse: 95 (01/31/19 0805)  Resp: 18 (01/31/19 0805)  BP: (!) 140/86 (01/31/19 0805)  SpO2: 96 % (01/31/19 0805) Vital Signs (24h Range):  Temp:  [96.3 °F (35.7 °C)-98.6 °F (37 °C)] 96.3 °F (35.7 °C)  Pulse:  [92-96] 95  Resp:  [18-20] 18  SpO2:  [91 %-96 %] 96 %  BP: (137-160)/(65-86) 140/86     Weight: 77.1 kg (170 lb)  Body mass index is 25.85 kg/m².    Intake/Output Summary (Last 24 hours) at 1/31/2019 1143  Last data filed at 1/31/2019 0636  Gross per 24 hour   Intake 4116.67 ml   Output --   Net 4116.67 ml      Physical Exam   Constitutional: He is oriented to person, place, and time.   HENT:   Nose: Nose normal.   Mouth/Throat: No oropharyngeal exudate.   Eyes: No scleral icterus.   Neck: Neck supple. No thyromegaly present.   Cardiovascular: Normal rate, regular rhythm, normal heart sounds and intact distal pulses.   Pulmonary/Chest:  Effort normal and breath sounds normal. No respiratory distress.   Abdominal: Soft. Bowel sounds are normal. He exhibits no distension. There is no tenderness.   Genitourinary:     Musculoskeletal: He exhibits no edema or tenderness.   RIGHT arm contracted; minimal movement BLEs (Chronic)     Neurological: He is alert and oriented to person, place, and time. No cranial nerve deficit.   Skin: Skin is warm and dry. Capillary refill takes less than 2 seconds. No erythema.   Sacral wound     Psychiatric: He has a normal mood and affect. His behavior is normal. Judgment and thought content normal.   Nursing note and vitals reviewed.      Significant Labs:   BMP:   Recent Labs   Lab 01/31/19  0453   GLU 88      K 3.4*      CO2 23   BUN 7*   CREATININE 0.8   CALCIUM 8.7     CBC:   Recent Labs   Lab 01/30/19 0425 01/31/19 0453   WBC 8.50 7.50   HGB 13.7* 13.9*   HCT 41.4 41.7    209       Significant Imaging: I have reviewed all pertinent imaging results/findings within the past 24 hours.    Assessment/Plan:      * Colitis, regional, unspecified complication    Stercoral colitis in constipated patient.  Responding to enemas.  C-scope when constipation resolves.     UTI (urinary tract infection)      Aerococcus on urine culture - tx with Keflex       MS (multiple sclerosis)    Failed outpatient medications; follows with Dr. Sanchez.  Has home PT/OT.         Hypertension    Chronic, stable.  Continue losartan, monitor BP closely, and titrate medication as required for sustained BP control and avoid hypotension  Hypertension Medications             losartan (COZAAR) 100 MG tablet Take 100 mg by mouth once daily.      Hospital Medications             losartan tablet 100 mg Take 4 tablets (100 mg total) by mouth once daily.             Constipation    Acute, severe enough  To cause colitis-; will check TSH and initiate bowel regimen per GI recommendations  With resultant colitis, severe pain.  Enema for  bowel clearance and initiation of daily bowel regimen: miralax, senna to start.        GERD (gastroesophageal reflux disease)    Chronic issue, utilize TUMS PRN as per home use.       Neurogenic bladder    Urinating okay with reported incomplete emptying.--  check PVR          VTE Risk Mitigation (From admission, onward)        Ordered     enoxaparin injection 40 mg  Daily      01/28/19 2309     IP VTE HIGH RISK PATIENT  Once      01/28/19 2309              Melinda Staley MD  Department of Hospital Medicine   [facility]

## 2019-01-31 NOTE — PLAN OF CARE
Problem: Adult Inpatient Plan of Care  Goal: Plan of Care Review  Outcome: Ongoing (interventions implemented as appropriate)   01/31/19 0510   Plan of Care Review   Plan of Care Reviewed With patient   Alert and oriented. No distress noted. Pt drank 2000ml golytely, tolerated well. BMx6. Brown bomb enema x2. ABX infused as ordered. Swallows meds whole. Call light in reach. Side rails up x3. Will continue to monitor.

## 2019-01-31 NOTE — PLAN OF CARE
Problem: Adult Inpatient Plan of Care  Goal: Plan of Care Review  Outcome: Ongoing (interventions implemented as appropriate)   01/30/19 2715   Plan of Care Review   Plan of Care Reviewed With patient;spouse   Progress improving   Pt medicated for pain. NAD noted. POC reviewed w pt and they verbalized understanding.     Pt free from falls, Pt incontinent of bowel and bladder. Bed in low position, side rails up x 2. Call light in reach, bed alarm on and wheels locked. Will continue to monitor.

## 2019-01-31 NOTE — OR NURSING
Have you had a colonoscopy LESS THAN 3 years ago? No  never  * If YES, answer these questions*:    1. Did patient have a prior colonic polyp in a previous surveillance/diagnostic colonoscopy and is 18 years or older on date of encounter?    2. Documentation of < 3 year interval since the patients last colonoscopy due to medical reasons (eg., last colonoscopy incomplete, last colonoscopy had inadequate prep, piecemeal removal of adenomas, or last colonoscopy found > 10 adenomas) ?

## 2019-01-31 NOTE — SUBJECTIVE & OBJECTIVE
Current Facility-Administered Medications on File Prior to Encounter   Medication    0.9%  NaCl infusion     Current Outpatient Medications on File Prior to Encounter   Medication Sig    calcium carbonate (TUMS) 200 mg calcium (500 mg) chewable tablet Take 2 tablets by mouth 3 (three) times daily as needed for Heartburn.    loperamide (IMODIUM) 2 mg capsule Take 2 mg by mouth 4 (four) times daily as needed for Diarrhea.    losartan (COZAAR) 100 MG tablet Take 100 mg by mouth once daily.    pantoprazole (PROTONIX) 40 MG tablet Take 40 mg by mouth once daily.       Review of patient's allergies indicates:  No Known Allergies    Past Medical History:   Diagnosis Date    Acute urinary retention     Acute UTI     treated twice in past three weeks with antibx per patient-since indwelling catheter    Back problem     herniated disc-lumbar region    GERD (gastroesophageal reflux disease)     Hypertension     130s/80s    MS (multiple sclerosis)     Neck stiffness     Neurogenic bladder     has indwelling catheter now    Sleep disturbance     Snoring     Visual impairment      Past Surgical History:   Procedure Laterality Date    CYSTOSCOPY N/A 4/19/2016    Performed by [facility]    CYSTOSCOPY N/A 5/6/2014    Performed by [facility] at [facility]    DISKECTOMY AND FUSION-ANTERIOR CERVICAL (ACDF)  C3/4 ACDF N/A 8/15/2016    Per[facility]au [facility] at [facility] OR    FINGER SURGERY      INJECTION, BOTULINUM TOXIN, TYPE A N/A 5/6/2014    Performed by [facility]    INJECTION-BOTOX N/A 4/19/2016    [facility]    LASER OF PROSTATE W/ GREEN LIGHT PVP      RECTAL BOTOX INJECTION      SPINAL FUSION      URODYNAMIC STUDY, FLUOROSCOPIC N/A 3/12/2014    Performed by [facility]line [facility]cid [facility] at [facility] [place]     Family History     Problem Relation (Age of Onset)    Dementia Mother     Hypertension Brother    No Known Problems Sister    Parkinsonism Father        Tobacco Use    Smoking status: Never Smoker    Smokeless tobacco: Never Used   Substance and Sexual Activity    Alcohol use: Yes     Comment: occasionally drinks    Drug use: No    Sexual activity: Not on file     Review of Systems   Constitutional: Negative for chills and unexpected weight change.   Respiratory: Negative for apnea and cough.    Cardiovascular: Negative for chest pain and palpitations.   Gastrointestinal: Positive for constipation. Negative for abdominal distention, nausea and vomiting.   Skin: Negative for color change and wound.   Hematological: Negative for adenopathy. Does not bruise/bleed easily.   Psychiatric/Behavioral: Negative for agitation and decreased concentration.     Objective:     Vital Signs (Most Recent):  Temp: 98.6 °F (37 °C) (01/31/19 0334)  Pulse: 92 (01/31/19 0334)  Resp: 18 (01/31/19 0334)  BP: 137/79 (01/31/19 0334)  SpO2: 95 % (01/31/19 0334) Vital Signs (24h Range):  Temp:  [96.8 °F (36 °C)-98.6 °F (37 °C)] 98.6 °F (37 °C)  Pulse:  [88-96] 92  Resp:  [16-20] 18  SpO2:  [90 %-96 %] 95 %  BP: (123-160)/(61-81) 137/79     Weight: 77.1 kg (170 lb)  Body mass index is 25.85 kg/m².    Physical Exam   Constitutional: No distress.   Cardiovascular: Normal rate and regular rhythm. Exam reveals no gallop.   No murmur heard.  Pulmonary/Chest: Effort normal and breath sounds normal.   Abdominal: Soft. Bowel sounds are normal. He exhibits no distension. There is no tenderness. No hernia.   Genitourinary:   Genitourinary Comments: Ext exam with no significant ext hemorrhoids.  AYDE with firm hard stool ball at about 7 cm from anal verge.   This was broken up as best I am able and minimal amount of stool returned   Skin: He is not diaphoretic.   Vitals reviewed.      Significant Labs:  CBC:   Recent Labs   Lab 01/31/19  0453   WBC 7.50   RBC 4.64   HGB 13.9*   HCT 41.7      MCV 90   MCH 30.0   MCHC  33.4     BMP:   Recent Labs   Lab 01/31/19  0453   GLU 88      K 3.4*      CO2 23   BUN 7*   CREATININE 0.8   CALCIUM 8.7       Significant Diagnostics:  Ct and gastrograffin enema reviewed.

## 2019-02-01 VITALS
HEART RATE: 98 BPM | TEMPERATURE: 97 F | BODY MASS INDEX: 25.76 KG/M2 | HEIGHT: 68 IN | SYSTOLIC BLOOD PRESSURE: 126 MMHG | DIASTOLIC BLOOD PRESSURE: 76 MMHG | RESPIRATION RATE: 18 BRPM | OXYGEN SATURATION: 92 % | WEIGHT: 170 LBS

## 2019-02-01 PROCEDURE — G0378 HOSPITAL OBSERVATION PER HR: HCPCS

## 2019-02-01 PROCEDURE — 25000003 PHARM REV CODE 250: Performed by: INTERNAL MEDICINE

## 2019-02-01 PROCEDURE — 25000003 PHARM REV CODE 250: Performed by: NURSE PRACTITIONER

## 2019-02-01 RX ORDER — LACTULOSE 10 G/15ML
20 SOLUTION ORAL EVERY 6 HOURS PRN
Qty: 900 ML | Refills: 0 | Status: SHIPPED | OUTPATIENT
Start: 2019-02-01 | End: 2019-02-11

## 2019-02-01 RX ORDER — POLYETHYLENE GLYCOL 3350 17 G/17G
17 POWDER, FOR SOLUTION ORAL 2 TIMES DAILY
Qty: 60 PACKET | Refills: 0 | Status: SHIPPED | OUTPATIENT
Start: 2019-02-01 | End: 2019-03-03

## 2019-02-01 RX ORDER — AMOXICILLIN 250 MG
1 CAPSULE ORAL 2 TIMES DAILY
COMMUNITY
Start: 2019-02-01 | End: 2020-03-09

## 2019-02-01 RX ORDER — CEPHALEXIN 500 MG/1
500 CAPSULE ORAL EVERY 6 HOURS
Qty: 16 CAPSULE | Refills: 0 | Status: SHIPPED | OUTPATIENT
Start: 2019-02-01 | End: 2019-02-05

## 2019-02-01 RX ADMIN — PANTOPRAZOLE SODIUM 40 MG: 40 TABLET, DELAYED RELEASE ORAL at 08:02

## 2019-02-01 RX ADMIN — POLYETHYLENE GLYCOL 3350 17 G: 17 POWDER, FOR SOLUTION ORAL at 08:02

## 2019-02-01 RX ADMIN — CEPHALEXIN 500 MG: 250 CAPSULE ORAL at 06:02

## 2019-02-01 RX ADMIN — CEPHALEXIN 500 MG: 250 CAPSULE ORAL at 12:02

## 2019-02-01 RX ADMIN — ACETAMINOPHEN 1000 MG: 500 TABLET, COATED ORAL at 12:02

## 2019-02-01 RX ADMIN — POTASSIUM CHLORIDE 40 MEQ: 20 SOLUTION ORAL at 11:02

## 2019-02-01 RX ADMIN — STANDARDIZED SENNA CONCENTRATE AND DOCUSATE SODIUM 1 TABLET: 8.6; 5 TABLET, FILM COATED ORAL at 08:02

## 2019-02-01 RX ADMIN — LOSARTAN POTASSIUM 100 MG: 25 TABLET, FILM COATED ORAL at 08:02

## 2019-02-01 NOTE — PLAN OF CARE
[facility] sent patient information for resumption of home health services to [facility] and [facility] home health through [facility].ALLY diego

## 2019-02-01 NOTE — NURSING
Discharge paper reviewedand copy given to patient . Verbalized understanding  IV access removed, no bleeding noted.

## 2019-02-01 NOTE — ANESTHESIA POSTPROCEDURE EVALUATION
"Anesthesia Post Evaluation    Patient: Michael German    Procedure(s) Performed: Procedure(s) (LRB):  COLONOSCOPY (N/A)    Final Anesthesia Type: general  Patient location during evaluation: PACU  Patient participation: Yes- Able to Participate  Level of consciousness: awake and alert and oriented  Post-procedure vital signs: reviewed and stable  Pain management: adequate  Airway patency: patent  PONV status at discharge: No PONV  Anesthetic complications: no      Cardiovascular status: blood pressure returned to baseline  Respiratory status: unassisted, spontaneous ventilation and room air  Hydration status: euvolemic  Follow-up not needed.        Visit Vitals  BP (!) 162/90 (Patient Position: Lying)   Pulse 97   Temp 36.4 °C (97.6 °F) (Oral)   Resp 16   Ht 5' 8" (1.727 m)   Wt 77.1 kg (170 lb)   SpO2 (!) 92%   BMI 25.85 kg/m²       Pain/Travis Score: Pain Rating Prior to Med Admin: 5 (1/31/2019  5:06 PM)  Travis Score: 7 (1/31/2019  3:54 PM)        "

## 2019-02-01 NOTE — DISCHARGE SUMMARY
[facility] Hospital Medicine  Discharge Summary      Patient Name: Michael German  MRN: [id number]  Admission Date: 1/28/2019  Hospital Length of Stay: 0 days  Discharge Date and Time:  02/01/2019 1:02 PM  Attending Physician: Melinda Staley MD   Discharging Provider: Melinda Staley MD  Primary Care Provider: Av Zaragoza MD      HPI:   Michael German is a 64 yo male with PMHx significant for MS, chronic bowel issues fluctuating between constipation and diarrhea, and HTN.  He presented to the ED with c/o 8/10 lower abdominal pain with radiation to his rectum.  He reports no BM x 4 days and prior to that had been only having watery stool so he began taking imodium.  He stopped the imodium once he had stopped having BMs.  He denies trying anything at home for constipation. He denies any N/V, fever/chills, CP, or SOB. He reports some recent difficulty emptying his bladder 2/2 constipation. He is wheelchair bound with MS for which he has failed all attempted treatment regimens. He was given a fleets enema in ED and has has some liquid stool since, but continues with rectal pressure and pain rated 3/10 at present.  CT with large stool burden.  Other pertinent medical history as below:    Procedure(s) (LRB):  COLONOSCOPY (N/A)      Hospital Course:   Michael German is a 64 yo male with PMHx significant for MS who was admitted with severe constipation and rectal impaction.    He was treated with laxatives, enemas and manual disimpaction. He had a colonoscopy yesterday afternoon which did not reveal any abnormalities.    He feels much better and is tolerating food. He will be d/c'd on Miralax BID and lactulose PRN.     Consults:   Consults (From admission, onward)        Status Ordering Provider     Inpatient consult to Gastroenterology  Once     Provider:  MD Nunu Varner JESSICA M.     Inpatient consult to General Surgery  Once     Provider:  Stanley Marie MD     Acknowledged SHANE CASILLAS          No new Assessment & Plan notes have been filed under this hospital service since the last note was generated.  Service: Hospital Medicine    Final Active Diagnoses:    Diagnosis Date Noted POA    PRINCIPAL PROBLEM:  Colitis, regional, unspecified complication [K50.119] 01/28/2019 Yes    UTI (urinary tract infection) [N39.0] 01/27/2016 Yes    MS (multiple sclerosis) [G35] 11/15/2012 Yes    Hypertension [I10] 10/05/2015 Yes    Constipation [K59.00] 01/28/2019 Yes    GERD (gastroesophageal reflux disease) [K21.9] 10/05/2015 Yes    Neurogenic bladder [N31.9] 01/13/2014 Yes      Problems Resolved During this Admission:       Discharged Condition: stable    [place] [facility]    Follow Up:  Follow-up Information     [facility].    Why:  Home Health  Contact information:  [phone]             Av Zaragoza MD In 1 week.    Specialty:  Internal Medicine  Why:  appointment 2-7-19 @ 10:15 a.m.  bring all your med bottles to the appointment  Contact information:  [address]  [place] [address]  [phone]             Luis James MD In 1 month.    Specialty:  Gastroenterology  Why:  appointment 3-1-19 @ 9 a.m.  Contact information:  [address]  [address]  [place] [address]  [phone]                 Patient Instructions:      Referral to Home health   Referral Priority: Routine Referral Type: Home Health   Referral Reason: Specialty Services Required   Requested Specialty: Home Health Services   Number of Visits Requested: 1     Diet Cardiac     Activity as tolerated       Significant Diagnostic Studies: Labs:   CMP   Recent Labs   Lab 01/31/19  0453      K 3.4*      CO2 23   GLU 88   BUN 7*   CREATININE 0.8   CALCIUM 8.7   PROT 6.1   ALBUMIN 3.4*   BILITOT 0.7   ALKPHOS 79   AST 47*   ALT 43   ANIONGAP 11   ESTGFRAFRICA >60   EGFRNONAA >60    and CBC   Recent Labs   Lab 01/31/19  0453   WBC 7.50   HGB 13.9*   HCT  41.7          Pending Diagnostic Studies:     Procedure Component Value Units Date/Time    FL Gastrografin Enema Therapeutic [[id number]]     Order Status:  Sent Lab Status:  No result          Medications:  Reconciled Home Medications:      Medication List      START taking these medications    lactulose 20 gram/30 mL Soln  Commonly known as:  CHRONULAC  Take 30 mLs (20 g total) by mouth every 6 (six) hours as needed.     polyethylene glycol 17 gram Pwpk  Commonly known as:  GLYCOLAX  Take 17 g by mouth 2 (two) times daily.     senna-docusate 8.6-50 mg 8.6-50 mg per tablet  Commonly known as:  PERICOLACE  Take 1 tablet by mouth 2 (two) times daily.        CONTINUE taking these medications    calcium carbonate 200 mg calcium (500 mg) chewable tablet  Commonly known as:  TUMS  Take 2 tablets by mouth 3 (three) times daily as needed for Heartburn.     loperamide 2 mg capsule  Commonly known as:  IMODIUM  Take 2 mg by mouth 4 (four) times daily as needed for Diarrhea.     losartan 100 MG tablet  Commonly known as:  COZAAR  Take 100 mg by mouth once daily.     pantoprazole 40 MG tablet  Commonly known as:  PROTONIX  Take 40 mg by mouth once daily.            Indwelling Lines/Drains at time of discharge:   Lines/Drains/Airways     Pressure Ulcer                 Pressure Ulcer 08/15/16 2035 coccyx Stage I 899 days                Time spent on the discharge of patient: 30 minutes  Patient was seen and examined on the date of discharge and determined to be suitable for discharge.         Melinda Staley MD  Department of Hospital Medicine  [facility]

## 2019-02-01 NOTE — PLAN OF CARE
Problem: Adult Inpatient Plan of Care  Goal: Plan of Care Review  Outcome: Ongoing (interventions implemented as appropriate)  Patient alert and oriented resting in bed. NAD. Denies pain or SOB. VSS. No tele . Brief. Turned patient every 2 hours. O2@2L Nc prn. Plan of care reviewed with patient. Verbalizes understanding.Call light in reach. Pt free from fall or injury. Will monitor.

## 2019-02-01 NOTE — PLAN OF CARE
02/01/19 1021   Final Note   Assessment Type Final Discharge Note   Anticipated Discharge Disposition Home-Health  ([facility])   What phone number can be called within the next 1-3 days to see how you are doing after discharge? ([phone])   Hospital Follow Up  Appt(s) scheduled? Yes

## 2019-02-01 NOTE — ANESTHESIA POSTPROCEDURE EVALUATION
"Anesthesia Post Evaluation    Patient: Michael German    Procedure(s) Performed: Procedure(s) (LRB):  COLONOSCOPY (N/A)    [facility] Anesthesia Post Op Evaluation    Visit Vitals  BP (!) 162/90 (Patient Position: Lying)   Pulse 97   Temp 36.4 °C (97.6 °F) (Oral)   Resp 16   Ht 5' 8" (1.727 m)   Wt 77.1 kg (170 lb)   SpO2 (!) 92%   BMI 25.85 kg/m²       Pain/Travis Score: Pain Rating Prior to Med Admin: 5 (1/31/2019  5:06 PM)  Travis Score: 7 (1/31/2019  3:54 PM)        "

## 2019-02-01 NOTE — PLAN OF CARE
Scheduled pt's hospital follow ups. AVS updated and nurse Castro updated.       02/01/19 1019   Discharge Reassessment   Assessment Type Discharge Planning Reassessment

## 2019-02-04 ENCOUNTER — TELEPHONE (OUTPATIENT)
Dept: MEDSURG UNIT | Facility: HOSPITAL | Age: 64
End: 2019-02-04

## 2019-02-07 ENCOUNTER — TELEPHONE (OUTPATIENT)
Dept: NEUROLOGY | Facility: CLINIC | Age: 64
End: 2019-02-07

## 2019-02-07 NOTE — TELEPHONE ENCOUNTER
----- Message from Dejan Perea sent at 2/7/2019 12:02 PM CST -----  Needs Advice    Reason for call: Mrs. German is calling to schedule an appt w/ the doctor, due to pt having trouble swallowing food/pills        Communication Preference: Mrs. German (wife) @ [phone]    Additional Information:

## 2019-02-07 NOTE — TELEPHONE ENCOUNTER
Returned call. Scheduled pt to be seen by Jami on 2/20/19 at pt's wife's request. Offer sooner appts, but pt's wife declined. Advised if pt having any acute swallowing issues, she should bring him to the ER. Pt's wife verbalized understanding.

## 2019-02-20 ENCOUNTER — TELEPHONE (OUTPATIENT)
Dept: NEUROLOGY | Facility: CLINIC | Age: 64
End: 2019-02-20

## 2019-02-20 ENCOUNTER — OFFICE VISIT (OUTPATIENT)
Dept: NEUROLOGY | Facility: CLINIC | Age: 64
End: 2019-02-20
Payer: MEDICARE

## 2019-02-20 ENCOUNTER — LAB VISIT (OUTPATIENT)
Dept: LAB | Facility: HOSPITAL | Age: 64
End: 2019-02-20
Payer: MEDICARE

## 2019-02-20 VITALS
HEART RATE: 85 BPM | DIASTOLIC BLOOD PRESSURE: 80 MMHG | SYSTOLIC BLOOD PRESSURE: 134 MMHG | BODY MASS INDEX: 25.85 KG/M2 | HEIGHT: 68 IN

## 2019-02-20 DIAGNOSIS — Z99.3 WHEELCHAIR BOUND: ICD-10-CM

## 2019-02-20 DIAGNOSIS — G81.90 HEMIPARESIS, UNSPECIFIED HEMIPARESIS ETIOLOGY, UNSPECIFIED LATERALITY: ICD-10-CM

## 2019-02-20 DIAGNOSIS — Z79.899 OTHER LONG TERM (CURRENT) DRUG THERAPY: ICD-10-CM

## 2019-02-20 DIAGNOSIS — K59.2 NEUROGENIC BOWEL: ICD-10-CM

## 2019-02-20 DIAGNOSIS — N31.9 NEUROGENIC BLADDER: ICD-10-CM

## 2019-02-20 DIAGNOSIS — R25.2 SPASTICITY: ICD-10-CM

## 2019-02-20 DIAGNOSIS — G35 MULTIPLE SCLEROSIS: ICD-10-CM

## 2019-02-20 DIAGNOSIS — G35 MULTIPLE SCLEROSIS: Primary | ICD-10-CM

## 2019-02-20 DIAGNOSIS — Z71.89 COUNSELING REGARDING GOALS OF CARE: ICD-10-CM

## 2019-02-20 LAB
25(OH)D3+25(OH)D2 SERPL-MCNC: 29 NG/ML
ALBUMIN SERPL BCP-MCNC: 4.3 G/DL
ALP SERPL-CCNC: 124 U/L
ALT SERPL W/O P-5'-P-CCNC: 118 U/L
ANION GAP SERPL CALC-SCNC: 6 MMOL/L
AST SERPL-CCNC: 61 U/L
BASOPHILS # BLD AUTO: 0.03 K/UL
BASOPHILS NFR BLD: 0.4 %
BILIRUB SERPL-MCNC: 0.5 MG/DL
BUN SERPL-MCNC: 13 MG/DL
CALCIUM SERPL-MCNC: 10.6 MG/DL
CHLORIDE SERPL-SCNC: 104 MMOL/L
CO2 SERPL-SCNC: 29 MMOL/L
CREAT SERPL-MCNC: 0.9 MG/DL
DIFFERENTIAL METHOD: ABNORMAL
EOSINOPHIL # BLD AUTO: 0.1 K/UL
EOSINOPHIL NFR BLD: 1.5 %
ERYTHROCYTE [DISTWIDTH] IN BLOOD BY AUTOMATED COUNT: 13.2 %
EST. GFR  (AFRICAN AMERICAN): >60 ML/MIN/1.73 M^2
EST. GFR  (NON AFRICAN AMERICAN): >60 ML/MIN/1.73 M^2
GLUCOSE SERPL-MCNC: 76 MG/DL
HCT VFR BLD AUTO: 51.5 %
HGB BLD-MCNC: 16.6 G/DL
IMM GRANULOCYTES # BLD AUTO: 0.06 K/UL
IMM GRANULOCYTES NFR BLD AUTO: 0.7 %
LYMPHOCYTES # BLD AUTO: 1 K/UL
LYMPHOCYTES NFR BLD: 12 %
MCH RBC QN AUTO: 29.6 PG
MCHC RBC AUTO-ENTMCNC: 32.2 G/DL
MCV RBC AUTO: 92 FL
MONOCYTES # BLD AUTO: 0.8 K/UL
MONOCYTES NFR BLD: 10.2 %
NEUTROPHILS # BLD AUTO: 6.1 K/UL
NEUTROPHILS NFR BLD: 75.2 %
NRBC BLD-RTO: 0 /100 WBC
PLATELET # BLD AUTO: 274 K/UL
PMV BLD AUTO: 10.2 FL
POTASSIUM SERPL-SCNC: 4.2 MMOL/L
PROT SERPL-MCNC: 7.9 G/DL
RBC # BLD AUTO: 5.6 M/UL
SODIUM SERPL-SCNC: 139 MMOL/L
WBC # BLD AUTO: 8.06 K/UL

## 2019-02-20 PROCEDURE — 99999 PR PBB SHADOW E&M-EST. PATIENT-LVL III: ICD-10-PCS | Mod: PBBFAC,,, | Performed by: CLINICAL NURSE SPECIALIST

## 2019-02-20 PROCEDURE — 3008F PR BODY MASS INDEX (BMI) DOCUMENTED: ICD-10-PCS | Mod: CPTII,S$GLB,, | Performed by: CLINICAL NURSE SPECIALIST

## 2019-02-20 PROCEDURE — 3079F PR MOST RECENT DIASTOLIC BLOOD PRESSURE 80-89 MM HG: ICD-10-PCS | Mod: CPTII,S$GLB,, | Performed by: CLINICAL NURSE SPECIALIST

## 2019-02-20 PROCEDURE — 85025 COMPLETE CBC W/AUTO DIFF WBC: CPT

## 2019-02-20 PROCEDURE — 99215 PR OFFICE/OUTPT VISIT, EST, LEVL V, 40-54 MIN: ICD-10-PCS | Mod: S$GLB,,, | Performed by: CLINICAL NURSE SPECIALIST

## 2019-02-20 PROCEDURE — 3075F PR MOST RECENT SYSTOLIC BLOOD PRESS GE 130-139MM HG: ICD-10-PCS | Mod: CPTII,S$GLB,, | Performed by: CLINICAL NURSE SPECIALIST

## 2019-02-20 PROCEDURE — 82306 VITAMIN D 25 HYDROXY: CPT

## 2019-02-20 PROCEDURE — 36415 COLL VENOUS BLD VENIPUNCTURE: CPT

## 2019-02-20 PROCEDURE — 99215 OFFICE O/P EST HI 40 MIN: CPT | Mod: S$GLB,,, | Performed by: CLINICAL NURSE SPECIALIST

## 2019-02-20 PROCEDURE — 99999 PR PBB SHADOW E&M-EST. PATIENT-LVL III: CPT | Mod: PBBFAC,,, | Performed by: CLINICAL NURSE SPECIALIST

## 2019-02-20 PROCEDURE — 3008F BODY MASS INDEX DOCD: CPT | Mod: CPTII,S$GLB,, | Performed by: CLINICAL NURSE SPECIALIST

## 2019-02-20 PROCEDURE — 3079F DIAST BP 80-89 MM HG: CPT | Mod: CPTII,S$GLB,, | Performed by: CLINICAL NURSE SPECIALIST

## 2019-02-20 PROCEDURE — 3075F SYST BP GE 130 - 139MM HG: CPT | Mod: CPTII,S$GLB,, | Performed by: CLINICAL NURSE SPECIALIST

## 2019-02-20 PROCEDURE — 80053 COMPREHEN METABOLIC PANEL: CPT

## 2019-02-20 RX ORDER — BACLOFEN 10 MG/1
TABLET ORAL
Qty: 30 TABLET | Refills: 2 | Status: SHIPPED | OUTPATIENT
Start: 2019-02-20 | End: 2019-05-21

## 2019-02-20 RX ORDER — LACTULOSE 10 G/15ML
30 SOLUTION ORAL; RECTAL
Status: ON HOLD | COMMUNITY
End: 2021-05-06 | Stop reason: HOSPADM

## 2019-02-20 RX ORDER — IBUPROFEN 100 MG/5ML
500 SUSPENSION, ORAL (FINAL DOSE FORM) ORAL 2 TIMES DAILY
COMMUNITY

## 2019-02-20 RX ORDER — LANOLIN ALCOHOL/MO/W.PET/CERES
5000 CREAM (GRAM) TOPICAL DAILY
Status: ON HOLD | COMMUNITY
End: 2021-05-06 | Stop reason: HOSPADM

## 2019-02-20 RX ORDER — PYRIDOXINE HCL (VITAMIN B6) 100 MG
100 TABLET ORAL DAILY
COMMUNITY

## 2019-02-20 NOTE — PROGRESS NOTES
Subjective:       Patient ID: Michael German is a 63 y.o. male who presents today for a routine clinic visit for MS.  The history has been provided by the patient. He was last seen by Dr. Lyon in October 2016. He is accompanied by his wife and daughter.     MS HPI:  · DMT: N/A  · Taking vitamin D3 as recommended? No--This constipated him in the past.   · Mr. German has not been seen in the MS center in about 2.5 years. His wife requested an appt to discuss recent swallowing difficulties. The biggest issue is with swallowing pills. His wife reports that he chokes on food and liquid. This happens at least once a day.   · Over the past few years, he has had PT at least 4-5 times with [facility]. He is currently getting it twice a week for 6 weeks.   · He was admitted to the hospital in late January for abdominal pain. Diagnosis was constipation with impaction. He was admitted for 4 days and had several enemas. He had a colonoscopy, which he was told was fine. However, the impaction was not totally cleared, so he has to go back in 3 months for a repeat colonoscopy. His bowels are regular now. He was also treated with antibiotics for colitis.   · He has a home health aide that comes twice a week. She bathes the patient and transfers him to the toilet. He also has a nurse that comes once a week for wound care for sacral wound.   · His lymphedema is still under control.     SOCIAL HISTORY  Social History     Tobacco Use    Smoking status: Never Smoker    Smokeless tobacco: Never Used   Substance Use Topics    Alcohol use: Yes     Comment: occasionally drinks    Drug use: No     Living arrangements - the patient lives with his wife.  Employment: receives [employer]     MS ROS:  · Fatigue: Yes - Energy level is pretty good; not taking Co-Q-10  · Sleep Disturbance: Yes - He feels like he sleeps pretty well. His wife said that snoring is not as bad. He sleeps in a hospital bed in a semi-reclined position, so he is  "snoring less. When he is lying flat, he feels pressure on his chest. He has not had a sleep study.   · Bladder Dysfunction: Yes -Bladder is "leaky." He wears pull-ups at all times. He had a UTI when he was admitted to the hospital, but he was not aware of this.   · Bowel Dysfunction: Yes - He is currently taking Miralax 1 capful twice daily and Lactulose as needed. Since hospital discharge, the bowels have been better.   · Spasticity: Yes -He has lower back spasms when he is moving in bed; this is not painful, but is just awkward. He is not taking tizanidine and does not feel like he needs it. He has some "cramping" in his right hand.   · Visual Symptoms: No; wears reading glasses   · Cognitive: The patient does not feel that he has memory problems, but his wife says that he repeats questions a lot.   · Mood Disorder: His family states that he has a negative attitude, in general. He denies any depression or anxiety, but does not believe in these things. He feels angry sometimes, but does have moments of happiness. He enjoys being around his brother and family members.   · Gait Disturbance: No  · Falls: No  · Hand Dysfunction: Yes - right hand stable and severely weak; left hand   · Pain: He denies any pain  · Sexual Dysfunction: Not Assessed  · Skin Breakdown: wound on sacrum; he believes this was a spider bite that he got while in inpatient rehab (has been there for 2.5 years).   · Tremors: No; he has some clonus in the right leg as times.   · Dysphagia:  As above.   · Dysarthria:  Speech is slurred at times, mostly when he is tired.   · Heat sensitivity:  Yes - He becomes very weak when overheated. His wife keeps the house cold.   · Any un-met adaptive needs? Yes - Has a manual Randee lift, toilet chair with wheels that rolls into the shower; has a good wheelchair cushion       Objective:        1. 25 foot timed walk: He is in a powerchair and cannot walk. He is unable to transfer independently.   Neurologic Exam   "      Neurologic Exam   He is in a power chair and is not able to walk; he is unable to transfer independently    MENTAL STATUS: Grossly intact.     CRANIAL NERVE EXAM: There is no internuclear ophthalmoplegia. Pupils are   equal, round and reactive to light. No dysarthria. Tongue is midline.   Hearing is intact.     MOTOR EXAM: Shows increased tone in the legs. He has a right hemiparesis,   much worse than the left. The right upper extremity has proximal muscle strength of 3/5 in the deltoid and triceps; Left upper extremity is 5-/5 in all groups.  Right lower extremity, 1/5 at the hip flexor, 1/5 at the knee flexor. Plantar flexion of the foot is 1/5. Left lower extremity hip flexion, knee flexion and dorsiflexion are all 4/5. He has significant spasticity (hard to bend right arm and bilateral legs).   REFLEXES: 2+ and symmetric throughout.     SENSORY EXAM: Shows decreased vibration sense in all four limbs, right greater than left    Imaging:     No new imaging to review today.     Labs:     Lab Results   Component Value Date    WBC 7.50 01/31/2019    RBC 4.64 01/31/2019    HGB 13.9 (L) 01/31/2019    HCT 41.7 01/31/2019    MCV 90 01/31/2019    MCH 30.0 01/31/2019    MCHC 33.4 01/31/2019    RDW 13.3 01/31/2019     01/31/2019    MPV 8.4 (L) 01/31/2019    GRAN 5.7 01/31/2019    GRAN 76.0 (H) 01/31/2019    LYMPH 0.9 (L) 01/31/2019    LYMPH 11.6 (L) 01/31/2019    MONO 0.8 01/31/2019    MONO 10.1 01/31/2019    EOS 0.1 01/31/2019    BASO 0.00 01/31/2019    EOSINOPHIL 1.6 01/31/2019    BASOPHIL 0.7 01/31/2019     Sodium   Date Value Ref Range Status   01/31/2019 141 136 - 145 mmol/L Final     Potassium   Date Value Ref Range Status   01/31/2019 3.4 (L) 3.5 - 5.1 mmol/L Final     Chloride   Date Value Ref Range Status   01/31/2019 107 95 - 110 mmol/L Final     CO2   Date Value Ref Range Status   01/31/2019 23 23 - 29 mmol/L Final     Glucose   Date Value Ref Range Status   01/31/2019 88 70 - 110 mg/dL Final     BUN,  Bld   Date Value Ref Range Status   01/31/2019 7 (L) 8 - 23 mg/dL Final     Creatinine   Date Value Ref Range Status   01/31/2019 0.8 0.5 - 1.4 mg/dL Final   11/03/2012 0.9 0.5 - 1.4 mg/dL Final     Calcium   Date Value Ref Range Status   01/31/2019 8.7 8.7 - 10.5 mg/dL Final   11/03/2012 9.8 8.7 - 10.5 mg/dL Final     Total Protein   Date Value Ref Range Status   01/31/2019 6.1 6.0 - 8.4 g/dL Final     Albumin   Date Value Ref Range Status   01/31/2019 3.4 (L) 3.5 - 5.2 g/dL Final     Total Bilirubin   Date Value Ref Range Status   01/31/2019 0.7 0.1 - 1.0 mg/dL Final     Comment:     For infants and newborns, interpretation of results should be based  on gestational age, weight and in agreement with clinical  observations.  Premature Infant recommended reference ranges:  Up to 24 hours.............<8.0 mg/dL  Up to 48 hours............<12.0 mg/dL  3-5 days..................<15.0 mg/dL  6-29 days.................<15.0 mg/dL       Alkaline Phosphatase   Date Value Ref Range Status   01/31/2019 79 55 - 135 U/L Final     AST   Date Value Ref Range Status   01/31/2019 47 (H) 10 - 40 U/L Final     ALT   Date Value Ref Range Status   01/31/2019 43 10 - 44 U/L Final     Anion Gap   Date Value Ref Range Status   01/31/2019 11 8 - 16 mmol/L Final   11/03/2012 13 5 - 15 meq/L Final     eGFR if    Date Value Ref Range Status   01/31/2019 >60 >60 mL/min/1.73 m^2 Final     eGFR if non    Date Value Ref Range Status   01/31/2019 >60 >60 mL/min/1.73 m^2 Final     Comment:     Calculation used to obtain the estimated glomerular filtration  rate (eGFR) is the CKD-EPI equation.        Lab Results   Component Value Date    COLORU Yellow 01/28/2019    APPEARANCEUA Hazy (A) 01/28/2019    PHUR 6.0 01/28/2019    SPECGRAV 1.020 01/28/2019    PROTEINUA Negative 01/28/2019    GLUCUA Negative 01/28/2019    KETONESU Trace (A) 01/28/2019    BILIRUBINUA Negative 01/28/2019    OCCULTUA 3+ (A) 01/28/2019    NITRITE  Negative 01/28/2019    UROBILINOGEN 1.0 01/28/2019    LEUKOCYTESUR Trace (A) 01/28/2019       Diagnosis/Assessment/Plan:    1. Multiple Sclerosis  · Assessment: Michael has advanced disability from multiple sclerosis; however, his left side is stronger than it was at last visit over 2 years ago. I encourage continued therapy for him.  Over the next few months, we will work on symptom management and access to resources and services.   · Imaging: MRI brain now; will check CMP for creatinine.   · Disease Modifying Therapies: He is not currently taking any DMT. He cannot take Vitamin D because it contributes to constipation. Will check basic labs today for CBC, CMP, and Vitamin D. We discussed Ocrevus, and he is interested, but I'd like to discuss this with Dr. Lyon to get her opinion. I have some concerns due to his age and immobility.     2. MS Symptom Assessment / Management  · Sleep Disturbance: Home sleep study is an option; he will think about this.   · Bowel Dysfunction: Current regimen seems to be working. Will follow closely.   · Spasticity: Will start baclofen 5-10mg at bedtime. Botox to RUE may be a consideration. I will see if we can arrange this on the [facility].   · Mood Disorder: Will monitor.   · Gait Disturbance: He does not walk. Continue PT.   · Hand Dysfunction: Discussed OT, but he defers for now.   · Skin Breakdown: Will monitor.   · Dysphagia:  Swallow eval ordered.     Over 50% of this 60 minute visit was spent in direct face to face counseling of the patient about MS, DMT considerations, and MS symptom management. The patient agrees with the plan of care. He will follow up with Dr. Lyon in May.     Jami Deluca, [facility]        There are no diagnoses linked to this encounter.

## 2019-02-20 NOTE — Clinical Note
lAyssa and Carmen, Mr. German has advanced MS. He lives at home with his wife. He is currently getting PT with [facility] twice a week for 6 weeks. He generally gets PT about 4 times a year, but wonders if he could possibly be eligible for maintenance therapy. He has Medicare. Would [facility] apply here? He also has a home health aide twice a week, but he and his wife could use more help. Not sure what options are available. I wonder if there is any option for Medicaid? I think his wife still works, and he gets disability, so household income might be too much. He is coming back in a few months, but just wanted him to be on our radar. He hasn't been seen in 2.5 years, but it sounds like he is more committed to coming regularly. Thanks for your help!Jami

## 2019-02-20 NOTE — TELEPHONE ENCOUNTER
Please contact patient to schedule Speech Eval/Orders referral in by [email or web address]     Dx: Multiple sclerosis

## 2019-02-27 ENCOUNTER — HOSPITAL ENCOUNTER (OUTPATIENT)
Dept: RADIOLOGY | Facility: HOSPITAL | Age: 64
Discharge: HOME | End: 2019-02-27
Attending: CLINICAL NURSE SPECIALIST
Payer: MEDICARE

## 2019-02-27 DIAGNOSIS — G35 MULTIPLE SCLEROSIS: ICD-10-CM

## 2019-02-27 PROCEDURE — 70551 MRI BRAIN STEM W/O DYE: CPT | Mod: 26,,, | Performed by: RADIOLOGY

## 2019-02-27 PROCEDURE — 70551 MRI BRAIN DEMYELINATING WITHOUT CONTRAST: ICD-10-PCS | Mod: 26,,, | Performed by: RADIOLOGY

## 2019-02-27 PROCEDURE — 70551 MRI BRAIN STEM W/O DYE: CPT | Mod: TC

## 2019-03-01 ENCOUNTER — OFFICE VISIT (OUTPATIENT)
Dept: GASTROENTEROLOGY | Facility: CLINIC | Age: 64
End: 2019-03-01
Payer: MEDICARE

## 2019-03-01 VITALS — HEART RATE: 90 BPM | SYSTOLIC BLOOD PRESSURE: 141 MMHG | DIASTOLIC BLOOD PRESSURE: 95 MMHG

## 2019-03-01 DIAGNOSIS — K56.41 FECAL IMPACTION: ICD-10-CM

## 2019-03-01 DIAGNOSIS — K59.04 CHRONIC IDIOPATHIC CONSTIPATION: ICD-10-CM

## 2019-03-01 DIAGNOSIS — Z99.3 WHEELCHAIR BOUND: ICD-10-CM

## 2019-03-01 DIAGNOSIS — G35 MS (MULTIPLE SCLEROSIS): Primary | ICD-10-CM

## 2019-03-01 PROBLEM — K50.119: Status: RESOLVED | Noted: 2019-01-28 | Resolved: 2019-03-01

## 2019-03-01 PROCEDURE — 99999 PR PBB SHADOW E&M-EST. PATIENT-LVL II: ICD-10-PCS | Mod: PBBFAC,,, | Performed by: INTERNAL MEDICINE

## 2019-03-01 PROCEDURE — 3080F DIAST BP >= 90 MM HG: CPT | Mod: CPTII,S$GLB,, | Performed by: INTERNAL MEDICINE

## 2019-03-01 PROCEDURE — 99999 PR PBB SHADOW E&M-EST. PATIENT-LVL II: CPT | Mod: PBBFAC,,, | Performed by: INTERNAL MEDICINE

## 2019-03-01 PROCEDURE — 99214 OFFICE O/P EST MOD 30 MIN: CPT | Mod: S$GLB,,, | Performed by: INTERNAL MEDICINE

## 2019-03-01 PROCEDURE — 3077F PR MOST RECENT SYSTOLIC BLOOD PRESSURE >= 140 MM HG: ICD-10-PCS | Mod: CPTII,S$GLB,, | Performed by: INTERNAL MEDICINE

## 2019-03-01 PROCEDURE — 99214 PR OFFICE/OUTPT VISIT, EST, LEVL IV, 30-39 MIN: ICD-10-PCS | Mod: S$GLB,,, | Performed by: INTERNAL MEDICINE

## 2019-03-01 PROCEDURE — 3077F SYST BP >= 140 MM HG: CPT | Mod: CPTII,S$GLB,, | Performed by: INTERNAL MEDICINE

## 2019-03-01 PROCEDURE — 3080F PR MOST RECENT DIASTOLIC BLOOD PRESSURE >= 90 MM HG: ICD-10-PCS | Mod: CPTII,S$GLB,, | Performed by: INTERNAL MEDICINE

## 2019-03-01 NOTE — PROGRESS NOTES
Subjective:       Patient ID: Michael German is a 63 y.o. male.    This is an established patient.      Chief Complaint: Constipation    Patient seen for follow up from hospital admission for chronic constipation, remote onset, complicated by severe fecal impaction at that time which was eventually disimpacted after multiple interventions.  No recurrence of symptoms.  He is moving his bowels well on current regimen and feels fine.  He is wheelchair bound secondary to history of MS.  He had colonoscopy at the time of admission with good proximal prep and some polyps noted.  Residual stool which precluded adequate exam was present in the the sigmoid and rectum.  No new complaints.      Review of Systems   Constitutional: Negative for chills, fatigue and fever.   HENT: Negative for trouble swallowing.    Respiratory: Negative for cough, shortness of breath and wheezing.    Cardiovascular: Negative for chest pain and palpitations.   Gastrointestinal: Positive for constipation (improved). Negative for abdominal pain, diarrhea, nausea and vomiting.   All other systems reviewed and are negative.      Objective:       Vitals:    03/01/19 0906   BP: (!) 141/95   Pulse: 90       Physical Exam   Constitutional: He is oriented to person, place, and time. He appears well-developed and well-nourished.   Wheelchair bound   HENT:   Head: Normocephalic and atraumatic.   Mouth/Throat: Oropharynx is clear and moist.   Eyes: Conjunctivae are normal. Pupils are equal, round, and reactive to light. No scleral icterus.   Cardiovascular: Normal rate and regular rhythm.   No murmur heard.  Pulmonary/Chest: Effort normal and breath sounds normal. He has no wheezes.   Abdominal: Soft. Bowel sounds are normal. He exhibits no distension. There is no tenderness.   Musculoskeletal: He exhibits edema (bilateral LE with associated erythema).   Neurological: He is alert and oriented to person, place, and time.   Vitals reviewed.        Lab  Results   Component Value Date    WBC 8.06 02/20/2019    HGB 16.6 02/20/2019    HCT 51.5 02/20/2019    MCV 92 02/20/2019     02/20/2019         Assessment:       1. MS (multiple sclerosis)    2. Chronic idiopathic constipation    3. Fecal impaction    4. Wheelchair bound        Plan:       1.  Continue current bowel regimen  2.  Avoid all antidiarrheals  3.  Flexible sigmoidoscopy to evaluate rectum and sigmoid which were incompletely visualized on colonoscopy  4.  Further recommendations to follow after above.

## 2019-03-04 ENCOUNTER — CLINICAL SUPPORT (OUTPATIENT)
Dept: SPEECH THERAPY | Facility: HOSPITAL | Age: 64
End: 2019-03-04
Payer: MEDICARE

## 2019-03-04 ENCOUNTER — HOSPITAL ENCOUNTER (OUTPATIENT)
Dept: RADIOLOGY | Facility: HOSPITAL | Age: 64
Discharge: HOME | End: 2019-03-04
Attending: CLINICAL NURSE SPECIALIST
Payer: MEDICARE

## 2019-03-04 ENCOUNTER — TELEPHONE (OUTPATIENT)
Dept: NEUROLOGY | Facility: CLINIC | Age: 64
End: 2019-03-04

## 2019-03-04 DIAGNOSIS — G35 MULTIPLE SCLEROSIS: ICD-10-CM

## 2019-03-04 DIAGNOSIS — R13.12 DYSPHAGIA, OROPHARYNGEAL: Primary | ICD-10-CM

## 2019-03-04 PROCEDURE — 92611 MOTION FLUOROSCOPY/SWALLOW: CPT | Mod: GN | Performed by: SPEECH-LANGUAGE PATHOLOGIST

## 2019-03-04 PROCEDURE — 74230 X-RAY XM SWLNG FUNCJ C+: CPT | Mod: TC

## 2019-03-04 PROCEDURE — 74230 X-RAY XM SWLNG FUNCJ C+: CPT | Mod: 26,,, | Performed by: RADIOLOGY

## 2019-03-04 PROCEDURE — 74230 FL MODIFIED BARIUM SWALLOW SPEECH STUDY: ICD-10-PCS | Mod: 26,,, | Performed by: RADIOLOGY

## 2019-03-04 NOTE — PROGRESS NOTES
MODIFIED BARIUM SWALLOW STUDY    REASON FOR REFERRAL:  Michael German, age 63, was referred by Jami Deluca, NP-neurology,  for a Modified Barium Swallow Study to rule out aspiration, assess his overall swallowing function, and determine safest consistencies for oral intake.  He was accompanied by his wife.    Mr. German reported that he has had PND for about 6-8 months with no real resolution until about 1 week ago when he began taking Claritin.  Prior to that he observed that if he was eating and talking and trying to manage the PND, he choked.  He also has a problem with pills sticking; he indicated the level of the valleculae.      Mr. German has a history of MS and is wheelchair bound.  He also has a history of ACDF at C3-C4, 2016.    MEDICAL HISTORY:  Past Medical History:   Diagnosis Date    Acute urinary retention     Acute UTI     treated twice in past three weeks with antibx per patient-since indwelling catheter    Back problem     herniated disc-lumbar region    GERD (gastroesophageal reflux disease)     Hypertension     130s/80s    MS (multiple sclerosis)     Neck stiffness     Neurogenic bladder     has indwelling catheter now    Sleep disturbance     Snoring     Visual impairment         SURGICAL HISTORY:  Past Surgical History:   Procedure Laterality Date    COLONOSCOPY N/A 1/31/2019    [facility]    CYSTOSCOPY N/A 4/19/2016    [facility]formed [facility] at [facility] OR 1ST FLR    CYSTOSCOPY N/A 5/6/2014    Performed [facility] at [facility]ST FLR    DISKECTOMY AND FUSION-ANTERIOR CERVICAL (ACDF)  C3/4 ACDF N/A 8/15/2016    [facility] OR    FINGER SURGERY      INJECTION, BOTULINUM TOXIN, TYPE A N/A 5/6/2014    Per[facility] at [facility] OR 1ST FLR    INJECTION-BOTOX N/A 4/19/2016    Per[facility] at [facility] 1ST FLR    LASER OF PROSTATE W/ GREEN LIGHT PVP      RECTAL  BOTOX INJECTION      SPINAL FUSION      URODYNAMIC STUDY, FLUOROSCOPIC N/A 3/12/2014    Per[facility] by [facility] at [facility] OR [address] [place] [address]       SWALLOWING HISTORY:  Taking a regular consistency diet with thin liquids; takes pills with liquids. No previous MBSS on file here.    FAMILY HISTORY:  Family History   Problem Relation Age of Onset    Dementia Mother     Parkinsonism Father     No Known Problems Sister     Hypertension Brother     Anesthesia problems Neg Hx         BEHAVIOR:  Mr. German was a very pleasant man who had normal affect and social interaction.  He arrived on a motorized wheelchair that he operates with his L hand.  He was canted to his R.   He was able to fully cooperate during the study.  Results of today's assessment were considered indicative of his current levels of swallowing functioning.      HEARING:  Subjectively, within normal limits.     ORAL PERIPHERAL:   Informal examination of the oral mechanism revealed structures and functioning within normal limits for swallowing and speech purposes.    Voice quality was within normal limits with no wet quality before, during, or after the study.      TEST FINDINGS:   Mr. German was seen in Radiology with the Radiologist for a Modified Barium Swallow Study.  He was seated in his wheelchair for a left lateral videofluoroscopic view.  A bolster was placed on his R to facilitate a more upright position and he maintained that well for most of the study, but was leaning slightly more R by the end.  He stated that he uses a coffee cup at home b/c he can reliably  the handle.  He was concerned he would drop the styrofoam cup used in the study and was noted to deform it while trying to be sure he gripped it well during a trial.  Clinician held the cup for him during the study.  He was able to place the spoons (pre-loaded by clinician per his guidance re: bolus size) and hold the cracker.    Consistencies assessed using radiopaque barium  contrast:  thin (single and continuous swallows via open cup),   thin puree (1-teaspoon bolus) via spoon,   thick puree (1-teaspoon bolus) via spoon,   solid (half-cracker boluses) by Mr. German's hand, and   12.5 mm barium tablet with water.    Strategies:  None.    Phases:  Oral:  Mr. German was able to obtain liquid and strip utensils well with no loss of material from the oral cavity.  He moved boluses through the oral cavity with appropriate transit time.  There was no pooling of liquids in the mouth.  Swallow reflex was triggered within normal limits.    Pharyngeal:  Boluses moved through the pharyngeal phase with no laryngeal penetration and no aspiration and no nasal regurgitation with the exception of trace flash laryngeal penetration with continuous swallows of thin liquids. This appeared to be related to allowing a substantial volume of a given bolus to reach the pyriforms before some swallows were initiated, facilitating a small amount to be expressed into the vestibule as it was closing; it was fully ejected.    - Timely initiation  - Adequate soft palate elevation  - Adequate laryngeal elevation and anterior hyoid excursion  - Adequate tongue base retraction  - Complete epiglottic inversion  - Complete laryngeal vestibular closure  - Adequate pharyngeal stripping wave  - Adequate PE segment opening.  -  No pharyngeal residue    Cervical Esophageal:  Boluses entered the upper esophagus within normal limits.  There appeared to be a prominence consistent with a cricopharyngeal bar, but no obstruction was noted.  ACDF hardware noted.    Rosenbeck 8-point Penetration-Aspiration Scale:  1 - Material does not enter airway.    IMPRESSIONS:   This 63 y.o. man appears to present with  1.  Oropharyngeal swallowing within normal limits for thin liquids, solids, and barium tablet.  Based on his report, his previous coughing may be primarily related to speaking while food is in his mouth, possibly paired with  attempting to manage/clear PND.  He states this has virtually ceased to be a problem since Claritin has been effective in resolving the PND over the past week.  2.  MS    RECOMMENDATIONS/PLAN OF CARE:   It is felt that Mr. German would benefit from  1.  Continuation of his current regular consistency diet with thin liquids using the following strategies and common aspiration precautions, including, but not limited to   A.  Appropriate upright seating for all eating and drinking.   B.  Avoiding talking, laughing, or otherwise using the voice while food or liquid is in the mouth.    C.  Embedding [id number] pills in puree (if not otherwise contra-indicated) as needed to facilitate progression during swallowing.   D.  Single liquid swallows.    E.  Monitoring for any signs/symptoms of aspiration (such as wet/gurgly voice that does not clear with coughing, inability to make any voice sounds, any persistent coughing with oral intake, otherwise unexplained fever, unexplained increased or new difficulty or discomfort breathing, unexplained increase in sleepiness/lethargy/significant fatigue, unexplained increase or new onset confusion or change in cognitive functioning, or any other unexplained change in health or well-being that could be related to swallowing).  2.  Follow up with YENIFER Deluca as directed.  3.  Repeat MBSS as needed.

## 2019-03-06 NOTE — PLAN OF CARE
IMPRESSIONS:   This 63 y.o. man appears to present with  1.  Oropharyngeal swallowing within normal limits for thin liquids, solids, and barium tablet.  Based on his report, his previous coughing may be primarily related to speaking while food is in his mouth, possibly paired with attempting to manage/clear PND.  He states this has virtually ceased to be a problem since Claritin has been effective in resolving the PND over the past week.  2.  MS    RECOMMENDATIONS/PLAN OF CARE:   It is felt that Mr. German would benefit from  1.  Continuation of his current regular consistency diet with thin liquids using the following strategies and common aspiration precautions, including, but not limited to   A.  Appropriate upright seating for all eating and drinking.   B.  Avoiding talking, laughing, or otherwise using the voice while food or liquid is in the mouth.    C.  Embedding axuzjyspi-zt-fmhfacu pills in puree (if not otherwise contra-indicated) as needed to facilitate progression during swallowing.   D.  Single liquid swallows.    E.  Monitoring for any signs/symptoms of aspiration (such as wet/gurgly voice that does not clear with coughing, inability to make any voice sounds, any persistent coughing with oral intake, otherwise unexplained fever, unexplained increased or new difficulty or discomfort breathing, unexplained increase in sleepiness/lethargy/significant fatigue, unexplained increase or new onset confusion or change in cognitive functioning, or any other unexplained change in health or well-being that could be related to swallowing).  2.  Follow up with YENIFER Deluca as directed.  3.  Repeat MBSS as needed.

## 2019-03-13 ENCOUNTER — TELEPHONE (OUTPATIENT)
Dept: PSYCHIATRY | Facility: CLINIC | Age: 64
End: 2019-03-13

## 2019-03-15 ENCOUNTER — TELEPHONE (OUTPATIENT)
Dept: PSYCHIATRY | Facility: CLINIC | Age: 64
End: 2019-03-15

## 2019-03-15 NOTE — TELEPHONE ENCOUNTER
SW intern left pt a voicemail message providing him with the phone number ([phone]) to call to get his name on the [employer] Waiver waiting list.

## 2019-03-18 ENCOUNTER — TELEPHONE (OUTPATIENT)
Dept: PSYCHIATRY | Facility: CLINIC | Age: 64
End: 2019-03-18

## 2019-03-18 NOTE — TELEPHONE ENCOUNTER
TRISTON intern spoke with pt by phone to provide him with the phone number to call to get his name on the waiting list for the [employer] Waiver. TRISTON intern also informed him of necessary information regarding this waiver.

## 2019-04-01 ENCOUNTER — TELEPHONE (OUTPATIENT)
Dept: NEUROLOGY | Facility: CLINIC | Age: 64
End: 2019-04-01

## 2019-04-01 DIAGNOSIS — G35 MULTIPLE SCLEROSIS: Primary | ICD-10-CM

## 2019-04-08 ENCOUNTER — TELEPHONE (OUTPATIENT)
Dept: NEUROLOGY | Facility: CLINIC | Age: 64
End: 2019-04-08

## 2019-04-08 NOTE — TELEPHONE ENCOUNTER
----- Message from Nasrin Sanches sent at 4/8/2019  2:58 PM CDT -----  Contact: pt @ [phone]  Asking to speak with Dr. Deluca, regarding the information that she was to give to the patient regarding his treatment. Please call.

## 2019-04-08 NOTE — TELEPHONE ENCOUNTER
Spoke with Michael. He will go to lab to have repeat CBC and LFTs done one morning this week (fasting).   I advised that his Vitamin D level is low. He agrees to take 2000 units daily, as he feels he can tolerate this dose and not have worsened constipation.   We discussed MRI results (stable--no new lesions), as well as swallow study results. He feels like his swallowing has improved since he started taking Claritin.   He is open to Botox to RUE. I will discuss this with our PA. He continues to take Baclofen.   He defers home sleep study.   We discussed Aubagio as possible DMT option, but we need to make sure liver enzymes are normal before proceeding.     Michael also reported that he and his wife are looking at placing him in a facility. It is becoming increasingly more difficult for her to care for him in the home. They are open to any suggestions in the [place] area.

## 2019-04-11 NOTE — TELEPHONE ENCOUNTER
SW received referral from KENY Izaguirre to speak with pt/wife about nursing home options in [place], [place], and [place].  Phoned pt and left voicemail for him to call.

## 2019-04-17 ENCOUNTER — TELEPHONE (OUTPATIENT)
Dept: GASTROENTEROLOGY | Facility: CLINIC | Age: 64
End: 2019-04-17

## 2019-04-17 NOTE — TELEPHONE ENCOUNTER
LATE ENTRY: On 4/11/19, received return call from pt and wife and discussed nursing home options in the [place] and [place] areas.  Pt to phone if he needs additional assistance.

## 2019-04-17 NOTE — TELEPHONE ENCOUNTER
----- Message from Luis Stein sent at 4/17/2019 11:54 AM CDT -----  Type:  Patient Call Back    Who Called:  Pt wife aguilar  Who Left Message for Patient: nurse  Does the patient know what this is regarding?:cancelling may 1.2019 appointment  Best Call Back Number:  [phone]  Additional Information:  Please call pt and leave a detailed message if there is no answer.

## 2019-04-24 ENCOUNTER — TELEPHONE (OUTPATIENT)
Dept: NEUROLOGY | Facility: CLINIC | Age: 64
End: 2019-04-24

## 2019-04-24 DIAGNOSIS — G35 MULTIPLE SCLEROSIS: Primary | ICD-10-CM

## 2019-04-24 NOTE — TELEPHONE ENCOUNTER
----- Message from Dejan Perea sent at 4/24/2019  9:31 AM CDT -----  Needs Advice    Reason for call: Pt is asking to speak w/ Jami about changing a blood draw he has scheduled        Communication Preference: [phone]    Additional Information:

## 2019-04-26 ENCOUNTER — TELEPHONE (OUTPATIENT)
Dept: GASTROENTEROLOGY | Facility: CLINIC | Age: 64
End: 2019-04-26

## 2019-04-26 NOTE — TELEPHONE ENCOUNTER
----- Message from Julia Forde sent at 4/26/2019  9:46 AM CDT -----  Contact: [phone]  Patient called to cancel procedure that is scheduled for 5/1/19.  Patient may be reached at [phone].

## 2019-05-03 ENCOUNTER — DOCUMENTATION ONLY (OUTPATIENT)
Dept: NEUROLOGY | Facility: CLINIC | Age: 64
End: 2019-05-03

## 2019-05-03 ENCOUNTER — TELEPHONE (OUTPATIENT)
Dept: NEUROLOGY | Facility: CLINIC | Age: 64
End: 2019-05-03

## 2019-05-04 NOTE — TELEPHONE ENCOUNTER
----- Message from Zenon Owen sent at 4/1/2019 11:05 AM CDT -----  Contact: JOSEMANUEL ALVARADO [[id number]]  Name of Who is Calling: JOSEMANUEL ALVARADO [[id number]]       What is the request in detail: Called regarding previous test. A call back form nurse is requested to review and further discuss. Please advise. Thanks        Can the clinic reply by [facility]: No       What Number to Call Back if not in [facility]: [phone]      
Left VM requesting return call.  
Pt is scheduled for repeat labs on 4/4/19. He is aware Jami will give him a call later Thursday or Friday.  
[phone]

## 2019-05-07 NOTE — TELEPHONE ENCOUNTER
----- Message from Dejan Perea sent at 5/7/2019  8:45 AM CDT -----  Patient Returning Call from [facility]    Who Left Message for Patient: Jami  Communication Preference: [phone]  Additional Information:

## 2019-05-21 ENCOUNTER — OFFICE VISIT (OUTPATIENT)
Dept: NEUROLOGY | Facility: CLINIC | Age: 64
End: 2019-05-21
Payer: MEDICARE

## 2019-05-21 ENCOUNTER — LAB VISIT (OUTPATIENT)
Dept: LAB | Facility: HOSPITAL | Age: 64
End: 2019-05-21
Attending: PSYCHIATRY & NEUROLOGY
Payer: MEDICARE

## 2019-05-21 VITALS
BODY MASS INDEX: 25.85 KG/M2 | HEIGHT: 68 IN | HEART RATE: 81 BPM | SYSTOLIC BLOOD PRESSURE: 118 MMHG | DIASTOLIC BLOOD PRESSURE: 79 MMHG

## 2019-05-21 DIAGNOSIS — G35 MS (MULTIPLE SCLEROSIS): Primary | ICD-10-CM

## 2019-05-21 DIAGNOSIS — Z11.4 ENCOUNTER FOR SCREENING FOR HIV: ICD-10-CM

## 2019-05-21 DIAGNOSIS — G35 MS (MULTIPLE SCLEROSIS): ICD-10-CM

## 2019-05-21 DIAGNOSIS — Z74.09 IMMOBILITY: ICD-10-CM

## 2019-05-21 DIAGNOSIS — G82.20 PARAPARESIS: ICD-10-CM

## 2019-05-21 DIAGNOSIS — D84.9 IMMUNOSUPPRESSION: ICD-10-CM

## 2019-05-21 DIAGNOSIS — M62.838 MUSCLE SPASM: ICD-10-CM

## 2019-05-21 PROCEDURE — 99999 PR PBB SHADOW E&M-EST. PATIENT-LVL III: ICD-10-PCS | Mod: PBBFAC,,, | Performed by: PSYCHIATRY & NEUROLOGY

## 2019-05-21 PROCEDURE — 3008F PR BODY MASS INDEX (BMI) DOCUMENTED: ICD-10-PCS | Mod: CPTII,S$GLB,, | Performed by: PSYCHIATRY & NEUROLOGY

## 2019-05-21 PROCEDURE — 86787 VARICELLA-ZOSTER ANTIBODY: CPT

## 2019-05-21 PROCEDURE — 86703 HIV-1/HIV-2 1 RESULT ANTBDY: CPT

## 2019-05-21 PROCEDURE — 86706 HEP B SURFACE ANTIBODY: CPT

## 2019-05-21 PROCEDURE — 86704 HEP B CORE ANTIBODY TOTAL: CPT

## 2019-05-21 PROCEDURE — 86803 HEPATITIS C AB TEST: CPT

## 2019-05-21 PROCEDURE — 87340 HEPATITIS B SURFACE AG IA: CPT

## 2019-05-21 PROCEDURE — 99215 PR OFFICE/OUTPT VISIT, EST, LEVL V, 40-54 MIN: ICD-10-PCS | Mod: S$GLB,,, | Performed by: PSYCHIATRY & NEUROLOGY

## 2019-05-21 PROCEDURE — 36415 COLL VENOUS BLD VENIPUNCTURE: CPT

## 2019-05-21 PROCEDURE — 3074F SYST BP LT 130 MM HG: CPT | Mod: CPTII,S$GLB,, | Performed by: PSYCHIATRY & NEUROLOGY

## 2019-05-21 PROCEDURE — 3078F DIAST BP <80 MM HG: CPT | Mod: CPTII,S$GLB,, | Performed by: PSYCHIATRY & NEUROLOGY

## 2019-05-21 PROCEDURE — 86790 VIRUS ANTIBODY NOS: CPT

## 2019-05-21 PROCEDURE — 99999 PR PBB SHADOW E&M-EST. PATIENT-LVL III: CPT | Mod: PBBFAC,,, | Performed by: PSYCHIATRY & NEUROLOGY

## 2019-05-21 PROCEDURE — 3008F BODY MASS INDEX DOCD: CPT | Mod: CPTII,S$GLB,, | Performed by: PSYCHIATRY & NEUROLOGY

## 2019-05-21 PROCEDURE — 86682 HELMINTH ANTIBODY: CPT

## 2019-05-21 PROCEDURE — 99215 OFFICE O/P EST HI 40 MIN: CPT | Mod: S$GLB,,, | Performed by: PSYCHIATRY & NEUROLOGY

## 2019-05-21 PROCEDURE — 86592 SYPHILIS TEST NON-TREP QUAL: CPT

## 2019-05-21 PROCEDURE — 3074F PR MOST RECENT SYSTOLIC BLOOD PRESSURE < 130 MM HG: ICD-10-PCS | Mod: CPTII,S$GLB,, | Performed by: PSYCHIATRY & NEUROLOGY

## 2019-05-21 PROCEDURE — 3078F PR MOST RECENT DIASTOLIC BLOOD PRESSURE < 80 MM HG: ICD-10-PCS | Mod: CPTII,S$GLB,, | Performed by: PSYCHIATRY & NEUROLOGY

## 2019-05-21 NOTE — PROGRESS NOTES
Subjective:       Patient ID: Michael German is a 63 y.o. male who presents today for a routine clinic visit for MS.  He is accompanied by his wife.     MS HPI:  · DMT: none  · Side effects from DMT? n/a  · Taking vitamin D3 as recommended? yes  · He has difficulty using bedside commode and potty chair  · Home health PT and HHA was provided back in February. HH is still coming currently. [facility]  · He has a pressure ulcer--has been getting wound care recently via home health  · He has a power chair  · He wife feels he is steadily getting weaker;    · He tried baclofen and it did not help much, so no longer taking it.  · Pt states he does not really want to take medications in general;      SOCIAL HISTORY  Social History     Tobacco Use    Smoking status: Never Smoker    Smokeless tobacco: Never Used   Substance Use Topics    Alcohol use: Yes     Comment: occasionally drinks    Drug use: No     Living arrangements - the patient lives with their spouse.  Employment : no    MS ROS:  · Bladder Dysfunction: one UTI in February; treated with Antibiotics.   · Bowel Dysfunction: Yes - still with constipation;  He does pay close attention to diet and fiber; on Miralax daily;        Objective:      Neurologic Exam     He is in a power chair and is not able to walk; he is unable to transfer independently    MENTAL STATUS: Grossly intact.     CRANIAL NERVE EXAM: There is no internuclear ophthalmoplegia. Pupils are   equal, round and reactive to light. No dysarthria. Tongue is midline.   Hearing is intact.     MOTOR EXAM: Shows increased tone in the legs. He has a right hemiparesis,   much worse than the left. The right upper extremity has proximal muscle strength of 3/5 in the deltoid and triceps; Left upper extremity is 5-/5 in all groups.  Right lower extremity, 1/5 at the hip flexor, 1/5 at the knee flexor. Plantar flexion of the foot is 1/5. Left lower extremity hip flexion, knee flexion and dorsiflexion  are all 4/5. He has significant spasticity (hard to bend right arm and bilateral legs).   REFLEXES: 2+ and symmetric throughout.     SENSORY EXAM: Shows decreased vibration sense in all four limbs, right greater than left        Imaging:     Results for orders placed during the hospital encounter of 02/27/19   MRI Brain Demyelinating Without Contrast    Impression 1. There is no acute abnormality.  Also, there is no definite or significant change in the appearance of the brain compared to the prior study.  The study is motion degraded.  There is moderate volume loss.  There is a moderate burden of white matter disease.  These findings were present previously and are consistent with the provided diagnosis of multiple sclerosis.  There is not been any significant interval progression.  There is no acute infarction, hemorrhage or mass/mass effect.  2. Persistent right mastoid partial opacification may represent an effusion.      Electronically signed by: Satish Wan MD  Date:    02/27/2019  Time:    11:31       Labs:     Lab Results   Component Value Date    JJDQHGYZ55EY 29 (L) 02/20/2019    GHNVAFLP86TN 83 06/29/2015    ZLRSMILS30LO 80 04/15/2014     Lab Results   Component Value Date    JCVINDEX 0.50 (A) 05/11/2016    JCVANTIBODY Positive (A) 05/11/2016     Lab Results   Component Value Date    JQ9RNDDL 65.6 05/11/2016    ABSOLUTECD3 476 (L) 05/11/2016    PC6VGLRE 21.2 05/11/2016    ABSOLUTECD8 154 (L) 05/11/2016    OQ5SCRVK 44.2 05/11/2016    ABSOLUTECD4 321 05/11/2016    LABCD48 2.09 05/11/2016     Lab Results   Component Value Date    WBC 8.06 02/20/2019    RBC 5.60 02/20/2019    HGB 16.6 02/20/2019    HCT 51.5 02/20/2019    MCV 92 02/20/2019    MCH 29.6 02/20/2019    MCHC 32.2 02/20/2019    RDW 13.2 02/20/2019     02/20/2019    MPV 10.2 02/20/2019    GRAN 6.1 02/20/2019    GRAN 75.2 (H) 02/20/2019    LYMPH 1.0 02/20/2019    LYMPH 12.0 (L) 02/20/2019    MONO 0.8 02/20/2019    MONO 10.2 02/20/2019     EOS 0.1 02/20/2019    BASO 0.03 02/20/2019    EOSINOPHIL 1.5 02/20/2019    BASOPHIL 0.4 02/20/2019     Sodium   Date Value Ref Range Status   02/20/2019 139 136 - 145 mmol/L Final     Potassium   Date Value Ref Range Status   02/20/2019 4.2 3.5 - 5.1 mmol/L Final     Chloride   Date Value Ref Range Status   02/20/2019 104 95 - 110 mmol/L Final     CO2   Date Value Ref Range Status   02/20/2019 29 23 - 29 mmol/L Final     Glucose   Date Value Ref Range Status   02/20/2019 76 70 - 110 mg/dL Final     BUN, Bld   Date Value Ref Range Status   02/20/2019 13 8 - 23 mg/dL Final     Creatinine   Date Value Ref Range Status   02/20/2019 0.9 0.5 - 1.4 mg/dL Final   11/03/2012 0.9 0.5 - 1.4 mg/dL Final     Calcium   Date Value Ref Range Status   02/20/2019 10.6 (H) 8.7 - 10.5 mg/dL Final   11/03/2012 9.8 8.7 - 10.5 mg/dL Final     Total Protein   Date Value Ref Range Status   02/20/2019 7.9 6.0 - 8.4 g/dL Final     Albumin   Date Value Ref Range Status   02/20/2019 4.3 3.5 - 5.2 g/dL Final     Total Bilirubin   Date Value Ref Range Status   02/20/2019 0.5 0.1 - 1.0 mg/dL Final     Comment:     For infants and newborns, interpretation of results should be based  on gestational age, weight and in agreement with clinical  observations.  Premature Infant recommended reference ranges:  Up to 24 hours.............<8.0 mg/dL  Up to 48 hours............<12.0 mg/dL  3-5 days..................<15.0 mg/dL  6-29 days.................<15.0 mg/dL       Alkaline Phosphatase   Date Value Ref Range Status   02/20/2019 124 55 - 135 U/L Final     AST   Date Value Ref Range Status   02/20/2019 61 (H) 10 - 40 U/L Final     ALT   Date Value Ref Range Status   02/20/2019 118 (H) 10 - 44 U/L Final     Anion Gap   Date Value Ref Range Status   02/20/2019 6 (L) 8 - 16 mmol/L Final   11/03/2012 13 5 - 15 meq/L Final     eGFR if    Date Value Ref Range Status   02/20/2019 >60.0 >60 mL/min/1.73 m^2 Final     eGFR if non African American    Date Value Ref Range Status   02/20/2019 >60.0 >60 mL/min/1.73 m^2 Final     Comment:     Calculation used to obtain the estimated glomerular filtration  rate (eGFR) is the CKD-EPI equation.          Diagnosis/Assessment/Plan:    1. Multiple Sclerosis  · Assessment: Pt has moderate disability.  We discussed DMT options.  He wants to proceed with Ocrevus, and he feels he wants to try anything to slow the pace of disability accumulation.  Risks/benefits discussed including risk of serious infection and he expresses understanding.   · Imaging: consider in Feb 2019  · Disease Modifying Therapies: will start Ocrevus; The rationale, side effects, risks and expectations of this medication was discussed. The patient was counseled about the risks associated with immune suppressive therapy, including a higher risk of serious infections and malignancy, as well as the importance of avoiding all live virus vaccines while on immune suppressive medication. Refer to ID for vaccinations in consideration of chronically immunosuppressed state; continue vitamin D    2. MS Symptom Assessment / Management  · Adaptive needs: refer to Dr. Li for power mobility evaluation; patient interested in power chair that would enable him to stand-up in supportive fashion  · No other changes to regimen described in ROS above       follow up Tran Blanchard PA-C in 4mo      Our visit today lasted 40 minutes, and 100% of this time was spent face to face with the patient. Over 50% of this visit included discussion of the treatment plan/medication changes/symptom management/exam findings/imaging results/coordination of care. The patient agrees with the plan of care.         Problem List Items Addressed This Visit        1 - High    MS (multiple sclerosis)    Relevant Orders    Ambulatory referral to Physiatry    Hepatitis A antibody, IgG (Completed)    Hepatitis B core antibody, total (Completed)    Hepatitis B surface antibody (Completed)    Hepatitis  C antibody (Completed)    Hepatitis B surface antigen (Completed)    HIV 1/2 Ag/Ab (4th Gen) (Completed)    Quantiferon Gold TB (Completed)    RPR (Completed)    Varicella zoster antibody, IgG (Completed)    STRONGYLOIDES IGG ANTIBODIES (Completed)    Ambulatory Referral to Infectious Disease       Unprioritized    Immobility - Primary    Relevant Orders    Ambulatory referral to Physiatry    Paraparesis      Other Visit Diagnoses     Encounter for screening for HIV        Relevant Orders    HIV 1/2 Ag/Ab (4th Gen) (Completed)

## 2019-05-21 NOTE — Clinical Note
Pt has signed paperwork for Ocrevus;  Kindly submit; infuse in [place]; referred to ID today; Goal is to get first infusion in about 6 weeks after first set of vaccines;

## 2019-05-22 LAB
HBV CORE AB SERPL QL IA: NEGATIVE
HBV SURFACE AB SER-ACNC: NEGATIVE M[IU]/ML
HBV SURFACE AG SERPL QL IA: NEGATIVE
HCV AB SERPL QL IA: NEGATIVE
HEPATITIS A ANTIBODY, IGG: NEGATIVE
HIV 1+2 AB+HIV1 P24 AG SERPL QL IA: NEGATIVE
RPR SER QL: NORMAL

## 2019-05-23 LAB
STRONGYLOIDES ANTIBODY IGG: NEGATIVE
VARICELLA INTERPRETATION: POSITIVE
VARICELLA ZOSTER IGG: 3.51 ISR (ref 0–0.9)

## 2019-05-28 PROBLEM — G82.20 PARAPARESIS: Status: ACTIVE | Noted: 2019-05-28

## 2019-05-28 PROBLEM — D84.9 IMMUNOSUPPRESSION: Status: ACTIVE | Noted: 2019-05-28

## 2019-06-12 ENCOUNTER — OFFICE VISIT (OUTPATIENT)
Dept: INFECTIOUS DISEASES | Facility: CLINIC | Age: 64
End: 2019-06-12
Payer: MEDICARE

## 2019-06-12 ENCOUNTER — CLINICAL SUPPORT (OUTPATIENT)
Dept: INFECTIOUS DISEASES | Facility: CLINIC | Age: 64
End: 2019-06-12
Payer: MEDICARE

## 2019-06-12 VITALS
HEIGHT: 68 IN | SYSTOLIC BLOOD PRESSURE: 145 MMHG | DIASTOLIC BLOOD PRESSURE: 92 MMHG | TEMPERATURE: 98 F | HEART RATE: 83 BPM | BODY MASS INDEX: 25.85 KG/M2

## 2019-06-12 DIAGNOSIS — G35 MULTIPLE SCLEROSIS: ICD-10-CM

## 2019-06-12 DIAGNOSIS — G35 MULTIPLE SCLEROSIS: Primary | ICD-10-CM

## 2019-06-12 PROCEDURE — 90750 HZV VACC RECOMBINANT IM: CPT | Mod: S$GLB,,, | Performed by: INTERNAL MEDICINE

## 2019-06-12 PROCEDURE — 90632 HEPATITIS A VACCINE ADULT IM: ICD-10-PCS | Mod: S$GLB,,, | Performed by: INTERNAL MEDICINE

## 2019-06-12 PROCEDURE — 3008F PR BODY MASS INDEX (BMI) DOCUMENTED: ICD-10-PCS | Mod: CPTII,S$GLB,, | Performed by: INTERNAL MEDICINE

## 2019-06-12 PROCEDURE — G0010 HEPATITIS A VACCINE ADULT IM: ICD-10-PCS | Mod: 59,S$GLB,, | Performed by: INTERNAL MEDICINE

## 2019-06-12 PROCEDURE — 99203 PR OFFICE/OUTPT VISIT, NEW, LEVL III, 30-44 MIN: ICD-10-PCS | Mod: 25,S$GLB,, | Performed by: INTERNAL MEDICINE

## 2019-06-12 PROCEDURE — 3077F SYST BP >= 140 MM HG: CPT | Mod: CPTII,S$GLB,, | Performed by: INTERNAL MEDICINE

## 2019-06-12 PROCEDURE — G0009 HEPATITIS B (RECOMBINANT) ADJUVANTED, 2 DOSE: ICD-10-PCS | Mod: 59,S$GLB,, | Performed by: INTERNAL MEDICINE

## 2019-06-12 PROCEDURE — 90472 IMMUNIZATION ADMIN EACH ADD: CPT | Mod: S$GLB,,, | Performed by: INTERNAL MEDICINE

## 2019-06-12 PROCEDURE — 3080F PR MOST RECENT DIASTOLIC BLOOD PRESSURE >= 90 MM HG: ICD-10-PCS | Mod: CPTII,S$GLB,, | Performed by: INTERNAL MEDICINE

## 2019-06-12 PROCEDURE — 90715 TDAP VACCINE 7 YRS/> IM: CPT | Mod: S$GLB,,, | Performed by: INTERNAL MEDICINE

## 2019-06-12 PROCEDURE — 99999 PR PBB SHADOW E&M-EST. PATIENT-LVL III: ICD-10-PCS | Mod: PBBFAC,,, | Performed by: INTERNAL MEDICINE

## 2019-06-12 PROCEDURE — 3080F DIAST BP >= 90 MM HG: CPT | Mod: CPTII,S$GLB,, | Performed by: INTERNAL MEDICINE

## 2019-06-12 PROCEDURE — 90715 TDAP VACCINE GREATER THAN OR EQUAL TO 7YO IM: ICD-10-PCS | Mod: S$GLB,,, | Performed by: INTERNAL MEDICINE

## 2019-06-12 PROCEDURE — 90670 PNEUMOCOCCAL CONJUGATE VACCINE 13-VALENT LESS THAN 5YO & GREATER THAN: ICD-10-PCS | Mod: S$GLB,,, | Performed by: INTERNAL MEDICINE

## 2019-06-12 PROCEDURE — 90739 HEPB VACC 2/4 DOSE ADULT IM: CPT | Mod: S$GLB,,, | Performed by: INTERNAL MEDICINE

## 2019-06-12 PROCEDURE — 90472 PNEUMOCOCCAL CONJUGATE VACCINE 13-VALENT LESS THAN 5YO & GREATER THAN: ICD-10-PCS | Mod: S$GLB,,, | Performed by: INTERNAL MEDICINE

## 2019-06-12 PROCEDURE — 90632 HEPA VACCINE ADULT IM: CPT | Mod: S$GLB,,, | Performed by: INTERNAL MEDICINE

## 2019-06-12 PROCEDURE — 3008F BODY MASS INDEX DOCD: CPT | Mod: CPTII,S$GLB,, | Performed by: INTERNAL MEDICINE

## 2019-06-12 PROCEDURE — G0009 ADMIN PNEUMOCOCCAL VACCINE: HCPCS | Mod: 59,S$GLB,, | Performed by: INTERNAL MEDICINE

## 2019-06-12 PROCEDURE — 90739 HEPATITIS B (RECOMBINANT) ADJUVANTED, 2 DOSE: ICD-10-PCS | Mod: S$GLB,,, | Performed by: INTERNAL MEDICINE

## 2019-06-12 PROCEDURE — 90471 TDAP VACCINE GREATER THAN OR EQUAL TO 7YO IM: ICD-10-PCS | Mod: S$GLB,,, | Performed by: INTERNAL MEDICINE

## 2019-06-12 PROCEDURE — 99999 PR PBB SHADOW E&M-EST. PATIENT-LVL III: CPT | Mod: PBBFAC,,, | Performed by: INTERNAL MEDICINE

## 2019-06-12 PROCEDURE — 90471 IMMUNIZATION ADMIN: CPT | Mod: S$GLB,,, | Performed by: INTERNAL MEDICINE

## 2019-06-12 PROCEDURE — 90750 ZOSTER RECOMBINANT VACCINE: ICD-10-PCS | Mod: S$GLB,,, | Performed by: INTERNAL MEDICINE

## 2019-06-12 PROCEDURE — 90670 PCV13 VACCINE IM: CPT | Mod: S$GLB,,, | Performed by: INTERNAL MEDICINE

## 2019-06-12 PROCEDURE — G0010 ADMIN HEPATITIS B VACCINE: HCPCS | Mod: 59,S$GLB,, | Performed by: INTERNAL MEDICINE

## 2019-06-12 PROCEDURE — 3077F PR MOST RECENT SYSTOLIC BLOOD PRESSURE >= 140 MM HG: ICD-10-PCS | Mod: CPTII,S$GLB,, | Performed by: INTERNAL MEDICINE

## 2019-06-12 PROCEDURE — 99203 OFFICE O/P NEW LOW 30 MIN: CPT | Mod: 25,S$GLB,, | Performed by: INTERNAL MEDICINE

## 2019-06-12 NOTE — PROGRESS NOTES
Subjective:      Patient ID: Michael German is a 63 y.o. male.    Chief Complaint:Multiple Sclerosis      History of Present Illness      Pt with MS referred by Dr. Lyon for pre-immunotherapy evaluation  The following test results are available:  Hepatitis A IgG antibody  -  Hepatitis B core antibody  -  Hepatitis B surface antigen  -  Heptatitis B surface antibody  -  Quantiferon gold test   -   Strongyloides IgG antibody  -  Varicella zoster IgG   +  HIV 1/2 antibody   -  RPR     -    Note he is IRLANDA virus antibody positive    Based on this I recommend he take:        influenza vaccine annually              prevnar today and pneumovax 2 months later        TDaP        Shingrix series        Heplisav x2         Hepatitis A series       Review of Systems   Constitution: Negative for chills, decreased appetite, fever, malaise/fatigue, night sweats, weight gain and weight loss.   HENT: Positive for congestion. Negative for ear pain, hearing loss, hoarse voice, sore throat and tinnitus.    Eyes: Positive for visual disturbance. Negative for blurred vision and redness.   Cardiovascular: Positive for leg swelling. Negative for chest pain and palpitations.   Respiratory: Positive for cough and sputum production. Negative for hemoptysis and shortness of breath.    Hematologic/Lymphatic: Negative for adenopathy. Does not bruise/bleed easily.   Skin: Negative for dry skin, itching, rash and suspicious lesions.   Musculoskeletal: Negative for back pain, joint pain, myalgias and neck pain.   Gastrointestinal: Positive for heartburn. Negative for abdominal pain, constipation, diarrhea, nausea and vomiting.   Genitourinary: Negative for dysuria, flank pain, frequency, hematuria, hesitancy and urgency.   Neurological: Positive for numbness. Negative for dizziness, headaches, paresthesias and weakness.   Psychiatric/Behavioral: Negative for depression and memory loss. The patient does not have insomnia and is not  nervous/anxious.      Objective:   Physical Exam   Constitutional: He is oriented to person, place, and time. He appears well-developed and well-nourished. No distress.   Alert, pleasant; in motorized chair accompanied by his wife   Neurological: He is alert and oriented to person, place, and time.   Vitals reviewed.    Assessment:       1. Multiple sclerosis          Plan:        see HPI

## 2019-06-12 NOTE — PROGRESS NOTES
Mr. nagel received Heplisav, Tdap, Prevnar 13, Hepatitis A and Shingles vaccines   Tolerated well  Left unit in NAD

## 2019-06-12 NOTE — LETTER
June 12, 2019      Kim Lyon MD  [address]  [facility] [address]           [facility]  [address]  [facility] [address]  Phone: [phone]  Fax: [phone]          Patient: Michael German   MR Number: [id number]   YOB: 1955   Date of Visit: 6/12/2019       Dear Dr. Kim Lyon:    Thank you for referring Michael German to me for evaluation. Attached you will find relevant portions of my assessment and plan of care.    If you have questions, please do not hesitate to call me. I look forward to following Michael German along with you.    Sincerely,    Patricio Soni MD    Enclosure  CC:  No Recipients    If you would like to receive this communication electronically, please contact [email or web address] or [phone] to request more information on [employer] [facility] access.    For providers and/or their staff who would like to refer a patient to [facility], please contact us through our one-stop-shop provider referral line, [facility], at [phone].    If you feel you have received this communication in error or would no longer like to receive these types of communications, please e-mail [email or web address]

## 2019-06-28 ENCOUNTER — TELEPHONE (OUTPATIENT)
Dept: NEUROLOGY | Facility: CLINIC | Age: 64
End: 2019-06-28

## 2019-06-28 RX ORDER — EPINEPHRINE 0.3 MG/.3ML
0.3 INJECTION SUBCUTANEOUS
Status: CANCELLED | OUTPATIENT
Start: 2019-06-28

## 2019-06-28 RX ORDER — DIPHENHYDRAMINE HYDROCHLORIDE 50 MG/ML
50 INJECTION INTRAMUSCULAR; INTRAVENOUS
Status: CANCELLED | OUTPATIENT
Start: 2019-06-28

## 2019-06-28 RX ORDER — ACETAMINOPHEN 325 MG/1
1000 TABLET ORAL
Status: CANCELLED | OUTPATIENT
Start: 2019-06-28

## 2019-06-28 RX ORDER — HEPARIN 100 UNIT/ML
500 SYRINGE INTRAVENOUS
Status: CANCELLED | OUTPATIENT
Start: 2019-06-28

## 2019-06-28 RX ORDER — FAMOTIDINE 10 MG/ML
20 INJECTION INTRAVENOUS
Status: CANCELLED | OUTPATIENT
Start: 2019-06-28

## 2019-06-28 RX ORDER — SODIUM CHLORIDE 0.9 % (FLUSH) 0.9 %
10 SYRINGE (ML) INJECTION
Status: CANCELLED | OUTPATIENT
Start: 2019-06-28

## 2019-07-19 RX ORDER — SODIUM CHLORIDE 0.9 % (FLUSH) 0.9 %
10 SYRINGE (ML) INJECTION
Status: CANCELLED | OUTPATIENT
Start: 2019-08-02

## 2019-07-19 RX ORDER — HEPARIN 100 UNIT/ML
500 SYRINGE INTRAVENOUS
Status: CANCELLED | OUTPATIENT
Start: 2019-08-02

## 2019-07-19 RX ORDER — DIPHENHYDRAMINE HYDROCHLORIDE 50 MG/ML
50 INJECTION INTRAMUSCULAR; INTRAVENOUS
Status: CANCELLED | OUTPATIENT
Start: 2019-08-02

## 2019-07-19 RX ORDER — EPINEPHRINE 0.3 MG/.3ML
0.3 INJECTION SUBCUTANEOUS
Status: CANCELLED | OUTPATIENT
Start: 2019-08-02

## 2019-07-19 RX ORDER — ACETAMINOPHEN 500 MG
1000 TABLET ORAL
Status: CANCELLED | OUTPATIENT
Start: 2019-08-02

## 2019-07-19 RX ORDER — FAMOTIDINE 10 MG/ML
20 INJECTION INTRAVENOUS
Status: CANCELLED | OUTPATIENT
Start: 2019-08-02

## 2019-07-25 ENCOUNTER — TELEPHONE (OUTPATIENT)
Dept: PSYCHIATRY | Facility: CLINIC | Age: 64
End: 2019-07-25

## 2019-07-25 NOTE — TELEPHONE ENCOUNTER
Faxed 's [facility] Medicare [place] information to [facility].

## 2019-08-02 ENCOUNTER — INFUSION (OUTPATIENT)
Dept: INFUSION THERAPY | Facility: HOSPITAL | Age: 64
End: 2019-08-02
Attending: PSYCHIATRY & NEUROLOGY
Payer: MEDICARE

## 2019-08-02 VITALS
RESPIRATION RATE: 18 BRPM | HEIGHT: 68 IN | SYSTOLIC BLOOD PRESSURE: 129 MMHG | TEMPERATURE: 97 F | BODY MASS INDEX: 28.85 KG/M2 | DIASTOLIC BLOOD PRESSURE: 87 MMHG | WEIGHT: 190.38 LBS | HEART RATE: 78 BPM | OXYGEN SATURATION: 98 %

## 2019-08-02 DIAGNOSIS — G35 MULTIPLE SCLEROSIS: Primary | ICD-10-CM

## 2019-08-02 PROCEDURE — 96375 TX/PRO/DX INJ NEW DRUG ADDON: CPT

## 2019-08-02 PROCEDURE — S0028 INJECTION, FAMOTIDINE, 20 MG: HCPCS | Performed by: PSYCHIATRY & NEUROLOGY

## 2019-08-02 PROCEDURE — 96415 CHEMO IV INFUSION ADDL HR: CPT

## 2019-08-02 PROCEDURE — A4216 STERILE WATER/SALINE, 10 ML: HCPCS | Performed by: PSYCHIATRY & NEUROLOGY

## 2019-08-02 PROCEDURE — 63600175 PHARM REV CODE 636 W HCPCS: Performed by: PSYCHIATRY & NEUROLOGY

## 2019-08-02 PROCEDURE — 25000003 PHARM REV CODE 250: Performed by: PSYCHIATRY & NEUROLOGY

## 2019-08-02 PROCEDURE — 96367 TX/PROPH/DG ADDL SEQ IV INF: CPT

## 2019-08-02 PROCEDURE — 96413 CHEMO IV INFUSION 1 HR: CPT

## 2019-08-02 RX ORDER — ACETAMINOPHEN 500 MG
1000 TABLET ORAL
Status: CANCELLED | OUTPATIENT
Start: 2019-08-16

## 2019-08-02 RX ORDER — SODIUM CHLORIDE 0.9 % (FLUSH) 0.9 %
10 SYRINGE (ML) INJECTION
Status: CANCELLED | OUTPATIENT
Start: 2019-08-16

## 2019-08-02 RX ORDER — FAMOTIDINE 10 MG/ML
20 INJECTION INTRAVENOUS
Status: CANCELLED | OUTPATIENT
Start: 2019-08-16

## 2019-08-02 RX ORDER — DIPHENHYDRAMINE HYDROCHLORIDE 50 MG/ML
50 INJECTION INTRAMUSCULAR; INTRAVENOUS
Status: DISCONTINUED | OUTPATIENT
Start: 2019-08-02 | End: 2019-08-02 | Stop reason: HOSPADM

## 2019-08-02 RX ORDER — DIPHENHYDRAMINE HYDROCHLORIDE 50 MG/ML
50 INJECTION INTRAMUSCULAR; INTRAVENOUS
Status: CANCELLED | OUTPATIENT
Start: 2019-08-16

## 2019-08-02 RX ORDER — ACETAMINOPHEN 500 MG
1000 TABLET ORAL
Status: COMPLETED | OUTPATIENT
Start: 2019-08-02 | End: 2019-08-02

## 2019-08-02 RX ORDER — SODIUM CHLORIDE 0.9 % (FLUSH) 0.9 %
10 SYRINGE (ML) INJECTION
Status: DISCONTINUED | OUTPATIENT
Start: 2019-08-02 | End: 2019-08-02 | Stop reason: HOSPADM

## 2019-08-02 RX ORDER — EPINEPHRINE 0.3 MG/.3ML
0.3 INJECTION SUBCUTANEOUS
Status: CANCELLED | OUTPATIENT
Start: 2019-08-16

## 2019-08-02 RX ORDER — HEPARIN 100 UNIT/ML
500 SYRINGE INTRAVENOUS
Status: CANCELLED | OUTPATIENT
Start: 2019-08-16

## 2019-08-02 RX ORDER — HEPARIN 100 UNIT/ML
500 SYRINGE INTRAVENOUS
Status: DISCONTINUED | OUTPATIENT
Start: 2019-08-02 | End: 2019-08-02 | Stop reason: HOSPADM

## 2019-08-02 RX ORDER — EPINEPHRINE 0.3 MG/.3ML
0.3 INJECTION SUBCUTANEOUS
Status: DISCONTINUED | OUTPATIENT
Start: 2019-08-02 | End: 2019-08-02 | Stop reason: HOSPADM

## 2019-08-02 RX ORDER — FAMOTIDINE 10 MG/ML
20 INJECTION INTRAVENOUS
Status: COMPLETED | OUTPATIENT
Start: 2019-08-02 | End: 2019-08-02

## 2019-08-02 RX ADMIN — ACETAMINOPHEN 1000 MG: 500 TABLET ORAL at 09:08

## 2019-08-02 RX ADMIN — DEXTROSE: 50 INJECTION, SOLUTION INTRAVENOUS at 10:08

## 2019-08-02 RX ADMIN — OCRELIZUMAB 300 MG: 300 INJECTION INTRAVENOUS at 11:08

## 2019-08-02 RX ADMIN — DIPHENHYDRAMINE HYDROCHLORIDE 50 MG: 50 INJECTION INTRAMUSCULAR; INTRAVENOUS at 10:08

## 2019-08-02 RX ADMIN — SODIUM CHLORIDE: 0.9 INJECTION, SOLUTION INTRAVENOUS at 09:08

## 2019-08-02 RX ADMIN — FAMOTIDINE 20 MG: 10 INJECTION INTRAVENOUS at 09:08

## 2019-08-02 RX ADMIN — SODIUM CHLORIDE, PRESERVATIVE FREE 10 ML: 5 INJECTION INTRAVENOUS at 01:08

## 2019-09-23 ENCOUNTER — OFFICE VISIT (OUTPATIENT)
Dept: NEUROLOGY | Facility: CLINIC | Age: 64
End: 2019-09-23
Payer: MEDICARE

## 2019-09-23 ENCOUNTER — CLINICAL SUPPORT (OUTPATIENT)
Dept: INFECTIOUS DISEASES | Facility: CLINIC | Age: 64
End: 2019-09-23
Payer: MEDICARE

## 2019-09-23 VITALS
SYSTOLIC BLOOD PRESSURE: 118 MMHG | BODY MASS INDEX: 28.95 KG/M2 | HEART RATE: 80 BPM | HEIGHT: 68 IN | DIASTOLIC BLOOD PRESSURE: 79 MMHG

## 2019-09-23 DIAGNOSIS — Z79.899 OTHER LONG TERM (CURRENT) DRUG THERAPY: ICD-10-CM

## 2019-09-23 DIAGNOSIS — G82.20 PARAPARESIS: ICD-10-CM

## 2019-09-23 DIAGNOSIS — R25.2 SPASTICITY: ICD-10-CM

## 2019-09-23 DIAGNOSIS — G35 MULTIPLE SCLEROSIS: Primary | ICD-10-CM

## 2019-09-23 DIAGNOSIS — Z29.89 PROPHYLACTIC IMMUNOTHERAPY: ICD-10-CM

## 2019-09-23 DIAGNOSIS — Z99.3 WHEELCHAIR BOUND: ICD-10-CM

## 2019-09-23 DIAGNOSIS — G35 MULTIPLE SCLEROSIS: ICD-10-CM

## 2019-09-23 DIAGNOSIS — Z71.89 COUNSELING REGARDING GOALS OF CARE: ICD-10-CM

## 2019-09-23 DIAGNOSIS — Z79.899 HIGH RISK MEDICATION USE: ICD-10-CM

## 2019-09-23 PROCEDURE — 90750 HZV VACC RECOMBINANT IM: CPT | Mod: S$GLB,,, | Performed by: INTERNAL MEDICINE

## 2019-09-23 PROCEDURE — 3008F PR BODY MASS INDEX (BMI) DOCUMENTED: ICD-10-PCS | Mod: CPTII,S$GLB,, | Performed by: CLINICAL NURSE SPECIALIST

## 2019-09-23 PROCEDURE — 3078F PR MOST RECENT DIASTOLIC BLOOD PRESSURE < 80 MM HG: ICD-10-PCS | Mod: CPTII,S$GLB,, | Performed by: CLINICAL NURSE SPECIALIST

## 2019-09-23 PROCEDURE — 99215 OFFICE O/P EST HI 40 MIN: CPT | Mod: S$GLB,,, | Performed by: CLINICAL NURSE SPECIALIST

## 2019-09-23 PROCEDURE — 3008F BODY MASS INDEX DOCD: CPT | Mod: CPTII,S$GLB,, | Performed by: CLINICAL NURSE SPECIALIST

## 2019-09-23 PROCEDURE — 90471 ZOSTER RECOMBINANT VACCINE: ICD-10-PCS | Mod: S$GLB,,, | Performed by: INTERNAL MEDICINE

## 2019-09-23 PROCEDURE — 3078F DIAST BP <80 MM HG: CPT | Mod: CPTII,S$GLB,, | Performed by: CLINICAL NURSE SPECIALIST

## 2019-09-23 PROCEDURE — 90750 ZOSTER RECOMBINANT VACCINE: ICD-10-PCS | Mod: S$GLB,,, | Performed by: INTERNAL MEDICINE

## 2019-09-23 PROCEDURE — 99999 PR PBB SHADOW E&M-EST. PATIENT-LVL III: CPT | Mod: PBBFAC,,, | Performed by: CLINICAL NURSE SPECIALIST

## 2019-09-23 PROCEDURE — 99999 PR PBB SHADOW E&M-EST. PATIENT-LVL III: ICD-10-PCS | Mod: PBBFAC,,, | Performed by: CLINICAL NURSE SPECIALIST

## 2019-09-23 PROCEDURE — 3074F PR MOST RECENT SYSTOLIC BLOOD PRESSURE < 130 MM HG: ICD-10-PCS | Mod: CPTII,S$GLB,, | Performed by: CLINICAL NURSE SPECIALIST

## 2019-09-23 PROCEDURE — G0010 HEPATITIS B (RECOMBINANT) ADJUVANTED, 2 DOSE: ICD-10-PCS | Mod: 59,S$GLB,, | Performed by: INTERNAL MEDICINE

## 2019-09-23 PROCEDURE — 99215 PR OFFICE/OUTPT VISIT, EST, LEVL V, 40-54 MIN: ICD-10-PCS | Mod: S$GLB,,, | Performed by: CLINICAL NURSE SPECIALIST

## 2019-09-23 PROCEDURE — 90471 IMMUNIZATION ADMIN: CPT | Mod: S$GLB,,, | Performed by: INTERNAL MEDICINE

## 2019-09-23 PROCEDURE — 90739 HEPB VACC 2/4 DOSE ADULT IM: CPT | Mod: S$GLB,,, | Performed by: INTERNAL MEDICINE

## 2019-09-23 PROCEDURE — 90739 HEPATITIS B (RECOMBINANT) ADJUVANTED, 2 DOSE: ICD-10-PCS | Mod: S$GLB,,, | Performed by: INTERNAL MEDICINE

## 2019-09-23 PROCEDURE — G0010 ADMIN HEPATITIS B VACCINE: HCPCS | Mod: 59,S$GLB,, | Performed by: INTERNAL MEDICINE

## 2019-09-23 PROCEDURE — 3074F SYST BP LT 130 MM HG: CPT | Mod: CPTII,S$GLB,, | Performed by: CLINICAL NURSE SPECIALIST

## 2019-09-23 RX ORDER — ATORVASTATIN CALCIUM 20 MG/1
20 TABLET, FILM COATED ORAL NIGHTLY
Refills: 5 | COMMUNITY
Start: 2019-08-07

## 2019-09-23 RX ORDER — TICAGRELOR 90 MG/1
90 TABLET ORAL EVERY 12 HOURS
Refills: 5 | COMMUNITY
Start: 2019-08-31 | End: 2021-01-20 | Stop reason: SDUPTHER

## 2019-09-23 RX ORDER — ASPIRIN 81 MG/1
81 TABLET ORAL DAILY
COMMUNITY
End: 2020-12-28 | Stop reason: ALTCHOICE

## 2019-09-23 RX ORDER — CARVEDILOL 6.25 MG/1
6.25 TABLET ORAL 2 TIMES DAILY
Refills: 5 | COMMUNITY
Start: 2019-09-01 | End: 2021-01-20 | Stop reason: SDUPTHER

## 2019-09-23 RX ORDER — NITROGLYCERIN 0.4 MG/1
TABLET SUBLINGUAL
Refills: 5 | COMMUNITY
Start: 2019-06-25 | End: 2021-01-24 | Stop reason: SDUPTHER

## 2019-09-23 NOTE — Clinical Note
MITCH--all is good with his Ocrevus. On exam, I couldn't get his right leg reflexes. Doesn't look like this has been an issue before.

## 2019-09-23 NOTE — PROGRESS NOTES
Subjective:       Patient ID: Michael German is a 64 y.o. male who presents today for a routine clinic visit for MS.  He was last seen by Dr. Lyon in May 2019. The history has been provided by the patient.     MS HPI:  · DMT: Ocrevus on 7/18 and 8/2/19  · Side effects from DMT: none   · Taking vitamin D3 as recommended? No   · [facility] is not coming out right now. He has to wait another 60 days before getting home health PT again. He feels better when he exercises regularly. While PT is on hold, he may have someone (a nurse he knows) come to the house to help him exercise.   · Pressure ulcer on buttock is stable   · He had a stent placed this summer after experiencing chest pain and shortness of breath.   · He saw ID and had vaccine consult.   · He did not have a mobility evaluation. His current wheelchair has had some issues charging. It will be evaluated by Maun[facility] soon. He might be interested in getting a new chair.     SOCIAL HISTORY  Social History     Tobacco Use    Smoking status: Never Smoker    Smokeless tobacco: Never Used   Substance Use Topics    Alcohol use: Yes     Comment: occasionally drinks    Drug use: No     Living arrangements - the patient lives with his spouse   Employment--no    MS ROS:  · Fatigue: Yes - Energy level is fine; not taking Co-Q-10  · Sleep Disturbance: Yes - He feels like he sleeps pretty well.  · Bladder Dysfunction: Yes -Urinates frequently.  He wears pull-ups at all times. He denies any UTIs.   · Bowel Dysfunction: Yes - He takes Senna as needed. Bowel movements are variable.   · Spasticity: Yes -He has tightness and spasms in his lower back.   · Visual Symptoms: No; wears reading glasses  · Cognitive: His wife feels that he is more forgetful than he used to be.   · Mood Disorder:  He denies any depression or anxiety. He feels angry often, but does have moments of happiness. He enjoys being around his brother and family members.   · Gait  Disturbance: No  · Falls: No  · Hand Dysfunction: Yes - right hand severely weak, but stable; left hand is ok   · Pain: He denies any pain  · Sexual Dysfunction: Not Assessed  · Skin Breakdown: wound on sacrum; he believes this was a spider bite that he got while in inpatient rehab (has been there for 2.5 years).   · Tremors: No; he has some clonus in the right leg at times.   · Dysphagia: He has had a cough for a year. He coughs day or night and has a lot of phlegm. He has done a swallow study. He has seen PCP and taken decongestants. His wife wonders if this might be a result of past spine surgery.   · Dysarthria:  Speech is slurred at times, mostly when he is tired. He speaks in a low volume sometimes.   · Heat sensitivity:  Yes - He limits the amount of time he spends outside.   · Any un-met adaptive needs? Yes - Has a manual Randee lift, toilet chair with wheels that rolls into the shower; has a good wheelchair cushion          Objective:        Neurologic Exam      He is in a power chair and is not able to walk; he is unable to transfer independently    MENTAL STATUS: Grossly intact.     CRANIAL NERVE EXAM: There is no internuclear ophthalmoplegia. Pupils are equal, round and reactive to light. No dysarthria. Tongue is midline. Hearing is intact.     MOTOR EXAM: Shows increased tone in the legs. He has a right hemiparesis, much worse than the left. The right upper extremity has proximal muscle strength of 3/5 in the deltoid and triceps; Left upper extremity is 5-/5 in all groups.  Right lower extremity, 1/5 at the hip flexor, 1/5 at the knee flexor. Plantar flexion of the foot is 1/5. Left lower extremity hip flexion, knee flexion and dorsiflexion are all 4-/5. He has significant spasticity (hard to bend right arm and bilateral legs).     REFLEXES: 2+ and symmetric in upper extremities and left leg; not able to elicit reflexes in right leg today    SENSORY EXAM: Shows decreased vibration sense in all four  limbs, right greater than left       Imaging:     Results for orders placed during the hospital encounter of 02/27/19   MRI Brain Demyelinating Without Contrast    Impression 1. There is no acute abnormality.  Also, there is no definite or significant change in the appearance of the brain compared to the prior study.  The study is motion degraded.  There is moderate volume loss.  There is a moderate burden of white matter disease.  These findings were present previously and are consistent with the provided diagnosis of multiple sclerosis.  There is not been any significant interval progression.  There is no acute infarction, hemorrhage or mass/mass effect.  2. Persistent right mastoid partial opacification may represent an effusion.      Electronically signed by: Satish Wan MD  Date:    02/27/2019  Time:    11:31       Labs:     Lab Results   Component Value Date    BKGNIKMR01RI 29 (L) 02/20/2019    NHBLQLTD22GP 83 06/29/2015    NNTPJZEV54NU 80 04/15/2014     Lab Results   Component Value Date    WBC 8.06 02/20/2019    RBC 5.60 02/20/2019    HGB 16.6 02/20/2019    HCT 51.5 02/20/2019    MCV 92 02/20/2019    MCH 29.6 02/20/2019    MCHC 32.2 02/20/2019    RDW 13.2 02/20/2019     02/20/2019    MPV 10.2 02/20/2019    GRAN 6.1 02/20/2019    GRAN 75.2 (H) 02/20/2019    LYMPH 1.0 02/20/2019    LYMPH 12.0 (L) 02/20/2019    MONO 0.8 02/20/2019    MONO 10.2 02/20/2019    EOS 0.1 02/20/2019    BASO 0.03 02/20/2019    EOSINOPHIL 1.5 02/20/2019    BASOPHIL 0.4 02/20/2019     Sodium   Date Value Ref Range Status   02/20/2019 139 136 - 145 mmol/L Final     Potassium   Date Value Ref Range Status   02/20/2019 4.2 3.5 - 5.1 mmol/L Final     Chloride   Date Value Ref Range Status   02/20/2019 104 95 - 110 mmol/L Final     CO2   Date Value Ref Range Status   02/20/2019 29 23 - 29 mmol/L Final     Glucose   Date Value Ref Range Status   02/20/2019 76 70 - 110 mg/dL Final     BUN, Bld   Date Value Ref Range Status    02/20/2019 13 8 - 23 mg/dL Final     Creatinine   Date Value Ref Range Status   02/20/2019 0.9 0.5 - 1.4 mg/dL Final   11/03/2012 0.9 0.5 - 1.4 mg/dL Final     Calcium   Date Value Ref Range Status   02/20/2019 10.6 (H) 8.7 - 10.5 mg/dL Final   11/03/2012 9.8 8.7 - 10.5 mg/dL Final     Total Protein   Date Value Ref Range Status   02/20/2019 7.9 6.0 - 8.4 g/dL Final     Albumin   Date Value Ref Range Status   02/20/2019 4.3 3.5 - 5.2 g/dL Final     Total Bilirubin   Date Value Ref Range Status   02/20/2019 0.5 0.1 - 1.0 mg/dL Final     Comment:     For infants and newborns, interpretation of results should be based  on gestational age, weight and in agreement with clinical  observations.  Premature Infant recommended reference ranges:  Up to 24 hours.............<8.0 mg/dL  Up to 48 hours............<12.0 mg/dL  3-5 days..................<15.0 mg/dL  6-29 days.................<15.0 mg/dL       Alkaline Phosphatase   Date Value Ref Range Status   02/20/2019 124 55 - 135 U/L Final     AST   Date Value Ref Range Status   02/20/2019 61 (H) 10 - 40 U/L Final     ALT   Date Value Ref Range Status   02/20/2019 118 (H) 10 - 44 U/L Final     Anion Gap   Date Value Ref Range Status   02/20/2019 6 (L) 8 - 16 mmol/L Final   11/03/2012 13 5 - 15 meq/L Final     eGFR if    Date Value Ref Range Status   02/20/2019 >60.0 >60 mL/min/1.73 m^2 Final     eGFR if non    Date Value Ref Range Status   02/20/2019 >60.0 >60 mL/min/1.73 m^2 Final     Comment:     Calculation used to obtain the estimated glomerular filtration  rate (eGFR) is the CKD-EPI equation.          Diagnosis/Assessment/Plan:    1. Multiple Sclerosis  · Assessment: Michael has advanced disability from MS. He is clinically stable today.   · Imaging: will consider in Feb 2019 (6 month interval after Ocrevus)  · Disease Modifying Therapies: Continue Ocrevus. Next infusion will be in January. Will check labs in December for CBC, Cd20,  hepatitis B, and Vitamin D. He is aware of the risks associated with immunosuppressant therapy, including increased risk of infection.     2. MS Symptom Assessment / Management  · Spasticity: Will explore options for maintenance PT with our .   · Gait Disturbance: Agree with more consistent PT.   · Dysphagia:  In regards to frequent coughing, I advise that he see ENT.   · Adaptive needs: Order placed for mobility evaluation with Dr. Zelaya. He is interested in chair that can help him go from sitting to standing.     Our visit today lasted 40 minutes, and 100% of this time was spent face to face with the patient. Over 50% of this visit included discussion of the treatment plan/symptom management/exam findings/coordination of care. The patient agrees with the plan of care. I will see him back after MRI in February 2020.     Jami Deluca, LIZZ-BC, MSCN      Problem List Items Addressed This Visit     None

## 2019-09-23 NOTE — Clinical Note
DV Michael was getting PT through [employer]. He was told he needs to take a break for 60 days (for insurance reasons). Would [facility] apply here?

## 2019-09-25 ENCOUNTER — TELEPHONE (OUTPATIENT)
Dept: NEUROLOGY | Facility: CLINIC | Age: 64
End: 2019-09-25

## 2019-09-25 DIAGNOSIS — G35 MULTIPLE SCLEROSIS: Primary | ICD-10-CM

## 2019-09-25 DIAGNOSIS — Z99.3 WHEELCHAIR BOUND: ICD-10-CM

## 2019-09-27 NOTE — TELEPHONE ENCOUNTER
TRISTON faxed  PT order and Medical Necessity Form to [employer] [phone] for approval and to [employer] [phone].  Phoned pt/wife with update.

## 2019-10-09 ENCOUNTER — LAB VISIT (OUTPATIENT)
Dept: LAB | Facility: HOSPITAL | Age: 64
End: 2019-10-09
Attending: FAMILY MEDICINE
Payer: MEDICARE

## 2019-10-09 DIAGNOSIS — I25.810 CORONARY ATHEROSCLEROSIS OF AUTOLOGOUS VEIN BYPASS GRAFT: Primary | ICD-10-CM

## 2019-10-09 LAB
ALBUMIN SERPL BCP-MCNC: 3.7 G/DL (ref 3.5–5.2)
ALP SERPL-CCNC: 89 U/L (ref 55–135)
ALT SERPL W/O P-5'-P-CCNC: 56 U/L (ref 10–44)
ANION GAP SERPL CALC-SCNC: 8 MMOL/L (ref 8–16)
AST SERPL-CCNC: 33 U/L (ref 10–40)
BASOPHILS # BLD AUTO: 0.03 K/UL (ref 0–0.2)
BASOPHILS NFR BLD: 0.4 % (ref 0–1.9)
BILIRUB SERPL-MCNC: 0.8 MG/DL (ref 0.1–1)
BUN SERPL-MCNC: 19 MG/DL (ref 8–23)
CALCIUM SERPL-MCNC: 9 MG/DL (ref 8.7–10.5)
CHLORIDE SERPL-SCNC: 109 MMOL/L (ref 95–110)
CHOLEST SERPL-MCNC: 95 MG/DL (ref 120–199)
CHOLEST/HDLC SERPL: 2.5 {RATIO} (ref 2–5)
CO2 SERPL-SCNC: 25 MMOL/L (ref 23–29)
CREAT SERPL-MCNC: 0.8 MG/DL (ref 0.5–1.4)
DIFFERENTIAL METHOD: ABNORMAL
EOSINOPHIL # BLD AUTO: 0.2 K/UL (ref 0–0.5)
EOSINOPHIL NFR BLD: 3.1 % (ref 0–8)
ERYTHROCYTE [DISTWIDTH] IN BLOOD BY AUTOMATED COUNT: 13.2 % (ref 11.5–14.5)
EST. GFR  (AFRICAN AMERICAN): >60 ML/MIN/1.73 M^2
EST. GFR  (NON AFRICAN AMERICAN): >60 ML/MIN/1.73 M^2
GLUCOSE SERPL-MCNC: 83 MG/DL (ref 70–110)
HCT VFR BLD AUTO: 44 % (ref 40–54)
HDLC SERPL-MCNC: 38 MG/DL (ref 40–75)
HDLC SERPL: 40 % (ref 20–50)
HGB BLD-MCNC: 14.5 G/DL (ref 14–18)
IMM GRANULOCYTES # BLD AUTO: 0.04 K/UL (ref 0–0.04)
IMM GRANULOCYTES NFR BLD AUTO: 0.5 % (ref 0–0.5)
LDLC SERPL CALC-MCNC: 46.2 MG/DL (ref 63–159)
LYMPHOCYTES # BLD AUTO: 0.6 K/UL (ref 1–4.8)
LYMPHOCYTES NFR BLD: 8.2 % (ref 18–48)
MCH RBC QN AUTO: 30.2 PG (ref 27–31)
MCHC RBC AUTO-ENTMCNC: 33 G/DL (ref 32–36)
MCV RBC AUTO: 92 FL (ref 82–98)
MONOCYTES # BLD AUTO: 0.8 K/UL (ref 0.3–1)
MONOCYTES NFR BLD: 10.4 % (ref 4–15)
NEUTROPHILS # BLD AUTO: 5.7 K/UL (ref 1.8–7.7)
NEUTROPHILS NFR BLD: 77.4 % (ref 38–73)
NONHDLC SERPL-MCNC: 57 MG/DL
NRBC BLD-RTO: 0 /100 WBC
PLATELET # BLD AUTO: 199 K/UL (ref 150–350)
PMV BLD AUTO: 11.2 FL (ref 9.2–12.9)
POTASSIUM SERPL-SCNC: 4.2 MMOL/L (ref 3.5–5.1)
PROT SERPL-MCNC: 6.4 G/DL (ref 6–8.4)
RBC # BLD AUTO: 4.8 M/UL (ref 4.6–6.2)
SODIUM SERPL-SCNC: 142 MMOL/L (ref 136–145)
TRIGL SERPL-MCNC: 54 MG/DL (ref 30–150)
WBC # BLD AUTO: 7.31 K/UL (ref 3.9–12.7)

## 2019-10-09 PROCEDURE — 80053 COMPREHEN METABOLIC PANEL: CPT

## 2019-10-09 PROCEDURE — 36415 COLL VENOUS BLD VENIPUNCTURE: CPT

## 2019-10-09 PROCEDURE — 85025 COMPLETE CBC W/AUTO DIFF WBC: CPT

## 2019-10-09 PROCEDURE — 80061 LIPID PANEL: CPT

## 2019-12-04 ENCOUNTER — TELEPHONE (OUTPATIENT)
Dept: NEUROLOGY | Facility: CLINIC | Age: 64
End: 2019-12-04

## 2019-12-17 ENCOUNTER — TELEPHONE (OUTPATIENT)
Dept: NEUROLOGY | Facility: CLINIC | Age: 64
End: 2019-12-17

## 2019-12-17 NOTE — TELEPHONE ENCOUNTER
Spoke to pt and he states the home health nurse comes to his home.  He states she draws his blood, but he dosen't know where she sends them.  He states the nurse is due to revisit him on Tuesday December 24th.  I can check with her then to see which lab she is using.

## 2020-01-03 ENCOUNTER — TELEPHONE (OUTPATIENT)
Dept: NEUROLOGY | Facility: CLINIC | Age: 65
End: 2020-01-03

## 2020-01-03 DIAGNOSIS — Z79.899 HIGH RISK MEDICATION USE: ICD-10-CM

## 2020-01-03 DIAGNOSIS — G35 MULTIPLE SCLEROSIS: Primary | ICD-10-CM

## 2020-01-03 NOTE — TELEPHONE ENCOUNTER
Call placed to pt's wife, Bindu. States that she lost the lab orders Jami gave to them at his last visit. She will take him to [place] lab on Wednesday. Appt scheduled. Confirms infusion date at 1/17.

## 2020-01-03 NOTE — TELEPHONE ENCOUNTER
----- Message from Sharon Ibrahim RN sent at 12/20/2019 12:15 PM CST -----  Contact: Pts wife       ----- Message -----  From: Calista Bravo  Sent: 12/20/2019  11:16 AM CST  To: Camron JOHNSON Staff    Patient Returning Call    Who Called: Pt's wife     Who Left Message for Patient: Patient     Does the patient know what this is regarding?: Regarding labs     Best Call Back Number: [phone] or [phone]

## 2020-01-07 ENCOUNTER — LAB VISIT (OUTPATIENT)
Dept: LAB | Facility: HOSPITAL | Age: 65
End: 2020-01-07
Attending: SPECIALIST
Payer: MEDICARE

## 2020-01-07 DIAGNOSIS — I20.0 INTERMEDIATE CORONARY SYNDROME: Primary | ICD-10-CM

## 2020-01-07 DIAGNOSIS — E78.9 DISORDER OF LIPOPROTEIN AND LIPID METABOLISM: ICD-10-CM

## 2020-01-07 DIAGNOSIS — Z79.899 ENCOUNTER FOR LONG-TERM (CURRENT) USE OF OTHER MEDICATIONS: ICD-10-CM

## 2020-01-07 LAB
ALBUMIN SERPL BCP-MCNC: 3.9 G/DL (ref 3.5–5.2)
ALP SERPL-CCNC: 85 U/L (ref 55–135)
ALT SERPL W/O P-5'-P-CCNC: 61 U/L (ref 10–44)
ANION GAP SERPL CALC-SCNC: 9 MMOL/L (ref 8–16)
AST SERPL-CCNC: 41 U/L (ref 10–40)
BILIRUB SERPL-MCNC: 0.7 MG/DL (ref 0.1–1)
BUN SERPL-MCNC: 16 MG/DL (ref 8–23)
CALCIUM SERPL-MCNC: 9.7 MG/DL (ref 8.7–10.5)
CHLORIDE SERPL-SCNC: 106 MMOL/L (ref 95–110)
CHOLEST SERPL-MCNC: 101 MG/DL (ref 120–199)
CHOLEST/HDLC SERPL: 2.1 {RATIO} (ref 2–5)
CO2 SERPL-SCNC: 24 MMOL/L (ref 23–29)
CREAT SERPL-MCNC: 0.8 MG/DL (ref 0.5–1.4)
ERYTHROCYTE [DISTWIDTH] IN BLOOD BY AUTOMATED COUNT: 13.4 % (ref 11.5–14.5)
EST. GFR  (AFRICAN AMERICAN): >60 ML/MIN/1.73 M^2
EST. GFR  (NON AFRICAN AMERICAN): >60 ML/MIN/1.73 M^2
GLUCOSE SERPL-MCNC: 96 MG/DL (ref 70–110)
HCT VFR BLD AUTO: 45.2 % (ref 40–54)
HDLC SERPL-MCNC: 48 MG/DL (ref 40–75)
HDLC SERPL: 47.5 % (ref 20–50)
HGB BLD-MCNC: 14.6 G/DL (ref 14–18)
LDLC SERPL CALC-MCNC: 43.2 MG/DL (ref 63–159)
MCH RBC QN AUTO: 29.9 PG (ref 27–31)
MCHC RBC AUTO-ENTMCNC: 32.3 G/DL (ref 32–36)
MCV RBC AUTO: 92 FL (ref 82–98)
NONHDLC SERPL-MCNC: 53 MG/DL
PLATELET # BLD AUTO: 219 K/UL (ref 150–350)
PMV BLD AUTO: 10.9 FL (ref 9.2–12.9)
POTASSIUM SERPL-SCNC: 4.3 MMOL/L (ref 3.5–5.1)
PROT SERPL-MCNC: 6.7 G/DL (ref 6–8.4)
RBC # BLD AUTO: 4.89 M/UL (ref 4.6–6.2)
SODIUM SERPL-SCNC: 139 MMOL/L (ref 136–145)
TRIGL SERPL-MCNC: 49 MG/DL (ref 30–150)
WBC # BLD AUTO: 7.7 K/UL (ref 3.9–12.7)

## 2020-01-07 PROCEDURE — 80061 LIPID PANEL: CPT

## 2020-01-07 PROCEDURE — 80053 COMPREHEN METABOLIC PANEL: CPT

## 2020-01-07 PROCEDURE — 36415 COLL VENOUS BLD VENIPUNCTURE: CPT

## 2020-01-07 PROCEDURE — 85027 COMPLETE CBC AUTOMATED: CPT

## 2020-01-08 ENCOUNTER — LAB VISIT (OUTPATIENT)
Dept: LAB | Facility: HOSPITAL | Age: 65
End: 2020-01-08
Attending: CLINICAL NURSE SPECIALIST
Payer: MEDICARE

## 2020-01-08 DIAGNOSIS — Z79.899 HIGH RISK MEDICATION USE: ICD-10-CM

## 2020-01-08 DIAGNOSIS — G35 MULTIPLE SCLEROSIS: ICD-10-CM

## 2020-01-08 LAB
BASOPHILS # BLD AUTO: 0.02 K/UL (ref 0–0.2)
BASOPHILS NFR BLD: 0.3 % (ref 0–1.9)
DIFFERENTIAL METHOD: ABNORMAL
EOSINOPHIL # BLD AUTO: 0.2 K/UL (ref 0–0.5)
EOSINOPHIL NFR BLD: 3.1 % (ref 0–8)
ERYTHROCYTE [DISTWIDTH] IN BLOOD BY AUTOMATED COUNT: 13.3 % (ref 11.5–14.5)
HBV CORE AB SERPL QL IA: NEGATIVE
HBV SURFACE AB SER-ACNC: POSITIVE M[IU]/ML
HBV SURFACE AG SERPL QL IA: NEGATIVE
HCT VFR BLD AUTO: 47.3 % (ref 40–54)
HGB BLD-MCNC: 15 G/DL (ref 14–18)
IMM GRANULOCYTES # BLD AUTO: 0.03 K/UL (ref 0–0.04)
IMM GRANULOCYTES NFR BLD AUTO: 0.4 % (ref 0–0.5)
LYMPHOCYTES # BLD AUTO: 0.7 K/UL (ref 1–4.8)
LYMPHOCYTES NFR BLD: 9.5 % (ref 18–48)
MCH RBC QN AUTO: 30.4 PG (ref 27–31)
MCHC RBC AUTO-ENTMCNC: 31.7 G/DL (ref 32–36)
MCV RBC AUTO: 96 FL (ref 82–98)
MONOCYTES # BLD AUTO: 0.7 K/UL (ref 0.3–1)
MONOCYTES NFR BLD: 9.7 % (ref 4–15)
NEUTROPHILS # BLD AUTO: 5.7 K/UL (ref 1.8–7.7)
NEUTROPHILS NFR BLD: 77 % (ref 38–73)
NRBC BLD-RTO: 0 /100 WBC
PLATELET # BLD AUTO: 226 K/UL (ref 150–350)
PMV BLD AUTO: 11.4 FL (ref 9.2–12.9)
RBC # BLD AUTO: 4.94 M/UL (ref 4.6–6.2)
WBC # BLD AUTO: 7.39 K/UL (ref 3.9–12.7)

## 2020-01-08 PROCEDURE — 36415 COLL VENOUS BLD VENIPUNCTURE: CPT | Mod: PO

## 2020-01-08 PROCEDURE — 87340 HEPATITIS B SURFACE AG IA: CPT

## 2020-01-08 PROCEDURE — 86704 HEP B CORE ANTIBODY TOTAL: CPT

## 2020-01-08 PROCEDURE — 86356 MONONUCLEAR CELL ANTIGEN: CPT

## 2020-01-08 PROCEDURE — 85025 COMPLETE CBC W/AUTO DIFF WBC: CPT

## 2020-01-08 PROCEDURE — 86706 HEP B SURFACE ANTIBODY: CPT

## 2020-01-13 LAB — CD20 CELLS NFR SPEC: NORMAL %

## 2020-01-29 ENCOUNTER — TELEPHONE (OUTPATIENT)
Dept: NEUROLOGY | Facility: CLINIC | Age: 65
End: 2020-01-29

## 2020-01-29 NOTE — TELEPHONE ENCOUNTER
Returned call. Pt states his powerchair is not functioning and therefore had to cancel his Ocrevus infusion, which was scheduled on 1/27/20. He also had me reschedule his MRIs/follow up appt to early March. Pt states he has contacted the company to fix his powerchair and they are ordering the parts, which can take weeks to come in.

## 2020-01-29 NOTE — TELEPHONE ENCOUNTER
----- Message from Shruti Funes sent at 1/29/2020 11:48 AM CST -----  Contact: pt @[phone]  Pt called in to speak with Dr. Deluca regarding a scheduled MRI. Please follow up

## 2020-02-21 NOTE — TELEPHONE ENCOUNTER
Spoke with pt's wife who stated his chair will be repaired on Friday, February 28; pt's wife stated his MRIs are already rescheduled for Tuesday, March 3 and follow up with [facility] rescheduled for Monday, March 9.

## 2020-03-03 ENCOUNTER — HOSPITAL ENCOUNTER (OUTPATIENT)
Dept: RADIOLOGY | Facility: HOSPITAL | Age: 65
Discharge: HOME | End: 2020-03-03
Attending: CLINICAL NURSE SPECIALIST
Payer: MEDICARE

## 2020-03-03 DIAGNOSIS — G35 MULTIPLE SCLEROSIS: ICD-10-CM

## 2020-03-03 LAB
CREAT SERPL-MCNC: 0.8 MG/DL (ref 0.5–1.4)
SAMPLE: NORMAL

## 2020-03-03 PROCEDURE — 70553 MRI BRAIN STEM W/O & W/DYE: CPT | Mod: TC,PO

## 2020-03-03 PROCEDURE — A9585 GADOBUTROL INJECTION: HCPCS | Mod: PO | Performed by: CLINICAL NURSE SPECIALIST

## 2020-03-03 PROCEDURE — 25500020 PHARM REV CODE 255: Mod: PO | Performed by: CLINICAL NURSE SPECIALIST

## 2020-03-03 RX ORDER — GADOBUTROL 604.72 MG/ML
8.5 INJECTION INTRAVENOUS
Status: COMPLETED | OUTPATIENT
Start: 2020-03-03 | End: 2020-03-03

## 2020-03-03 RX ADMIN — GADOBUTROL 8.5 ML: 604.72 INJECTION INTRAVENOUS at 12:03

## 2020-03-06 ENCOUNTER — INFUSION (OUTPATIENT)
Dept: INFUSION THERAPY | Facility: HOSPITAL | Age: 65
End: 2020-03-06
Attending: PSYCHIATRY & NEUROLOGY
Payer: MEDICARE

## 2020-03-06 VITALS
HEART RATE: 72 BPM | HEIGHT: 68 IN | DIASTOLIC BLOOD PRESSURE: 92 MMHG | SYSTOLIC BLOOD PRESSURE: 154 MMHG | BODY MASS INDEX: 28.04 KG/M2 | RESPIRATION RATE: 18 BRPM | OXYGEN SATURATION: 95 % | TEMPERATURE: 98 F | WEIGHT: 185 LBS

## 2020-03-06 DIAGNOSIS — G35 MULTIPLE SCLEROSIS: Primary | ICD-10-CM

## 2020-03-06 PROCEDURE — 96413 CHEMO IV INFUSION 1 HR: CPT

## 2020-03-06 PROCEDURE — A4216 STERILE WATER/SALINE, 10 ML: HCPCS | Performed by: PSYCHIATRY & NEUROLOGY

## 2020-03-06 PROCEDURE — S0028 INJECTION, FAMOTIDINE, 20 MG: HCPCS | Performed by: PSYCHIATRY & NEUROLOGY

## 2020-03-06 PROCEDURE — 96375 TX/PRO/DX INJ NEW DRUG ADDON: CPT

## 2020-03-06 PROCEDURE — 25000003 PHARM REV CODE 250: Performed by: PSYCHIATRY & NEUROLOGY

## 2020-03-06 PROCEDURE — 96415 CHEMO IV INFUSION ADDL HR: CPT

## 2020-03-06 PROCEDURE — 63600175 PHARM REV CODE 636 W HCPCS: Performed by: PSYCHIATRY & NEUROLOGY

## 2020-03-06 PROCEDURE — 96367 TX/PROPH/DG ADDL SEQ IV INF: CPT

## 2020-03-06 RX ORDER — ACETAMINOPHEN 500 MG
1000 TABLET ORAL
Status: COMPLETED | OUTPATIENT
Start: 2020-03-06 | End: 2020-03-06

## 2020-03-06 RX ORDER — SODIUM CHLORIDE 0.9 % (FLUSH) 0.9 %
10 SYRINGE (ML) INJECTION
Status: CANCELLED | OUTPATIENT
Start: 2020-03-13

## 2020-03-06 RX ORDER — FAMOTIDINE 10 MG/ML
20 INJECTION INTRAVENOUS
Status: COMPLETED | OUTPATIENT
Start: 2020-03-06 | End: 2020-03-06

## 2020-03-06 RX ORDER — ACETAMINOPHEN 500 MG
1000 TABLET ORAL
Status: CANCELLED | OUTPATIENT
Start: 2020-03-13

## 2020-03-06 RX ORDER — DIPHENHYDRAMINE HYDROCHLORIDE 50 MG/ML
50 INJECTION INTRAMUSCULAR; INTRAVENOUS
Status: CANCELLED | OUTPATIENT
Start: 2020-03-13

## 2020-03-06 RX ORDER — HEPARIN 100 UNIT/ML
500 SYRINGE INTRAVENOUS
Status: CANCELLED | OUTPATIENT
Start: 2020-03-13

## 2020-03-06 RX ORDER — FAMOTIDINE 10 MG/ML
20 INJECTION INTRAVENOUS
Status: CANCELLED | OUTPATIENT
Start: 2020-03-13

## 2020-03-06 RX ORDER — DIPHENHYDRAMINE HYDROCHLORIDE 50 MG/ML
50 INJECTION INTRAMUSCULAR; INTRAVENOUS
Status: DISCONTINUED | OUTPATIENT
Start: 2020-03-06 | End: 2020-03-06 | Stop reason: HOSPADM

## 2020-03-06 RX ORDER — EPINEPHRINE 0.3 MG/.3ML
0.3 INJECTION SUBCUTANEOUS
Status: CANCELLED | OUTPATIENT
Start: 2020-03-13

## 2020-03-06 RX ORDER — SODIUM CHLORIDE 0.9 % (FLUSH) 0.9 %
10 SYRINGE (ML) INJECTION
Status: DISCONTINUED | OUTPATIENT
Start: 2020-03-06 | End: 2020-03-06 | Stop reason: HOSPADM

## 2020-03-06 RX ORDER — EPINEPHRINE 0.3 MG/.3ML
0.3 INJECTION SUBCUTANEOUS
Status: DISCONTINUED | OUTPATIENT
Start: 2020-03-06 | End: 2020-03-06 | Stop reason: HOSPADM

## 2020-03-06 RX ORDER — HEPARIN 100 UNIT/ML
500 SYRINGE INTRAVENOUS
Status: DISCONTINUED | OUTPATIENT
Start: 2020-03-06 | End: 2020-03-06 | Stop reason: HOSPADM

## 2020-03-06 RX ADMIN — DIPHENHYDRAMINE HYDROCHLORIDE 50 MG: 50 INJECTION INTRAMUSCULAR; INTRAVENOUS at 10:03

## 2020-03-06 RX ADMIN — ACETAMINOPHEN 1000 MG: 500 TABLET ORAL at 10:03

## 2020-03-06 RX ADMIN — OCRELIZUMAB 600 MG: 300 INJECTION INTRAVENOUS at 11:03

## 2020-03-06 RX ADMIN — FAMOTIDINE 20 MG: 10 INJECTION, SOLUTION INTRAVENOUS at 10:03

## 2020-03-06 RX ADMIN — SODIUM CHLORIDE, PRESERVATIVE FREE 10 ML: 5 INJECTION INTRAVENOUS at 04:03

## 2020-03-06 RX ADMIN — DEXTROSE: 50 INJECTION, SOLUTION INTRAVENOUS at 10:03

## 2020-03-09 ENCOUNTER — OFFICE VISIT (OUTPATIENT)
Dept: NEUROLOGY | Facility: CLINIC | Age: 65
End: 2020-03-09
Payer: MEDICARE

## 2020-03-09 ENCOUNTER — CLINICAL SUPPORT (OUTPATIENT)
Dept: INFECTIOUS DISEASES | Facility: CLINIC | Age: 65
End: 2020-03-09
Payer: MEDICARE

## 2020-03-09 VITALS
BODY MASS INDEX: 28.03 KG/M2 | HEIGHT: 68 IN | WEIGHT: 184.94 LBS | HEART RATE: 74 BPM | SYSTOLIC BLOOD PRESSURE: 136 MMHG | DIASTOLIC BLOOD PRESSURE: 86 MMHG

## 2020-03-09 DIAGNOSIS — K59.2 NEUROGENIC BOWEL: ICD-10-CM

## 2020-03-09 DIAGNOSIS — N31.9 NEUROGENIC DYSFUNCTION OF THE URINARY BLADDER: ICD-10-CM

## 2020-03-09 DIAGNOSIS — G35 MULTIPLE SCLEROSIS: Primary | ICD-10-CM

## 2020-03-09 DIAGNOSIS — Z79.899 OTHER LONG TERM (CURRENT) DRUG THERAPY: ICD-10-CM

## 2020-03-09 DIAGNOSIS — G82.20 PARAPARESIS: ICD-10-CM

## 2020-03-09 DIAGNOSIS — G35 MULTIPLE SCLEROSIS: ICD-10-CM

## 2020-03-09 DIAGNOSIS — Z29.89 PROPHYLACTIC IMMUNOTHERAPY: ICD-10-CM

## 2020-03-09 DIAGNOSIS — Z71.89 COUNSELING REGARDING GOALS OF CARE: ICD-10-CM

## 2020-03-09 DIAGNOSIS — Z79.899 HIGH RISK MEDICATION USE: ICD-10-CM

## 2020-03-09 PROCEDURE — 3008F BODY MASS INDEX DOCD: CPT | Mod: CPTII,S$GLB,, | Performed by: CLINICAL NURSE SPECIALIST

## 2020-03-09 PROCEDURE — 90632 HEPATITIS A VACCINE ADULT IM: ICD-10-PCS | Mod: S$GLB,,, | Performed by: INTERNAL MEDICINE

## 2020-03-09 PROCEDURE — 99999 PR PBB SHADOW E&M-EST. PATIENT-LVL III: ICD-10-PCS | Mod: PBBFAC,,, | Performed by: CLINICAL NURSE SPECIALIST

## 2020-03-09 PROCEDURE — 90632 HEPA VACCINE ADULT IM: CPT | Mod: S$GLB,,, | Performed by: INTERNAL MEDICINE

## 2020-03-09 PROCEDURE — 3079F PR MOST RECENT DIASTOLIC BLOOD PRESSURE 80-89 MM HG: ICD-10-PCS | Mod: CPTII,S$GLB,, | Performed by: CLINICAL NURSE SPECIALIST

## 2020-03-09 PROCEDURE — 3075F SYST BP GE 130 - 139MM HG: CPT | Mod: CPTII,S$GLB,, | Performed by: CLINICAL NURSE SPECIALIST

## 2020-03-09 PROCEDURE — 90471 IMMUNIZATION ADMIN: CPT | Mod: S$GLB,,, | Performed by: INTERNAL MEDICINE

## 2020-03-09 PROCEDURE — 3008F PR BODY MASS INDEX (BMI) DOCUMENTED: ICD-10-PCS | Mod: CPTII,S$GLB,, | Performed by: CLINICAL NURSE SPECIALIST

## 2020-03-09 PROCEDURE — 90471 HEPATITIS A VACCINE ADULT IM: ICD-10-PCS | Mod: S$GLB,,, | Performed by: INTERNAL MEDICINE

## 2020-03-09 PROCEDURE — 99215 PR OFFICE/OUTPT VISIT, EST, LEVL V, 40-54 MIN: ICD-10-PCS | Mod: S$GLB,,, | Performed by: CLINICAL NURSE SPECIALIST

## 2020-03-09 PROCEDURE — 3075F PR MOST RECENT SYSTOLIC BLOOD PRESS GE 130-139MM HG: ICD-10-PCS | Mod: CPTII,S$GLB,, | Performed by: CLINICAL NURSE SPECIALIST

## 2020-03-09 PROCEDURE — G0008 ADMIN INFLUENZA VIRUS VAC: HCPCS | Mod: S$GLB,,, | Performed by: INTERNAL MEDICINE

## 2020-03-09 PROCEDURE — 99999 PR PBB SHADOW E&M-EST. PATIENT-LVL III: CPT | Mod: PBBFAC,,, | Performed by: CLINICAL NURSE SPECIALIST

## 2020-03-09 PROCEDURE — 90686 FLU VACCINE (QUAD) GREATER THAN OR EQUAL TO 3YO PRESERVATIVE FREE IM: ICD-10-PCS | Mod: S$GLB,,, | Performed by: INTERNAL MEDICINE

## 2020-03-09 PROCEDURE — 90686 IIV4 VACC NO PRSV 0.5 ML IM: CPT | Mod: S$GLB,,, | Performed by: INTERNAL MEDICINE

## 2020-03-09 PROCEDURE — 3079F DIAST BP 80-89 MM HG: CPT | Mod: CPTII,S$GLB,, | Performed by: CLINICAL NURSE SPECIALIST

## 2020-03-09 PROCEDURE — G0008 FLU VACCINE (QUAD) GREATER THAN OR EQUAL TO 3YO PRESERVATIVE FREE IM: ICD-10-PCS | Mod: S$GLB,,, | Performed by: INTERNAL MEDICINE

## 2020-03-09 PROCEDURE — 99215 OFFICE O/P EST HI 40 MIN: CPT | Mod: S$GLB,,, | Performed by: CLINICAL NURSE SPECIALIST

## 2020-03-09 NOTE — PROGRESS NOTES
Subjective:          Patient ID: Michael German is a 64 y.o. male who presents today for a routine clinic visit for MS.  He was last seen in September. The history has been provided by the patient. He is accompanied by his wife.     MS HPI:  · DMT: ocrelizumab on 3/6/20; due next in September 2020   · Side effects from DMT? No  · Taking vitamin D3 as recommended? Yes -  Dose: 2000 units daily   He denies any frequent infections. He did receive a flu shot.   He denies any new or worsening MS symptoms.   He does try to exercise at home.   He has PT coming in once a week. He also has an aide once a week to help him shower. These are from [employer].   He uses [employer] lift for all transfers and denies any falls.     Medications:  Current Outpatient Medications   Medication Sig    ascorbic acid, vitamin C, (VITAMIN C) 1000 MG tablet Take 1,000 mg by mouth once daily.    aspirin (ECOTRIN) 81 MG EC tablet Take 81 mg by mouth once daily.    atorvastatin (LIPITOR) 20 MG tablet Take 20 mg by mouth every evening.    BRILINTA 90 mg tablet Take 90 mg by mouth every 12 (twelve) hours.    calcium carbonate (TUMS) 200 mg calcium (500 mg) chewable tablet Take 2 tablets by mouth 3 (three) times daily as needed for Heartburn.    carvedilol (COREG) 6.25 MG tablet Take 6.25 mg by mouth 2 (two) times daily.    lactulose (CHRONULAC) 10 gram/15 mL solution Take 30 g by mouth 3 (three) times daily.    losartan (COZAAR) 100 MG tablet Take 100 mg by mouth once daily.    pantoprazole (PROTONIX) 40 MG tablet Take 40 mg by mouth once daily.    cyanocobalamin (VITAMIN B-12) 1000 MCG tablet Take 5,000 mcg by mouth once daily.    nitroGLYCERIN (NITROSTAT) 0.4 MG SL tablet DISSOLVE ONE TABLET UNDER THE TONGUE EVERY 5 MINUTES AS NEEDED FOR CHEST PAIN. DO NOT EXCEED A TOTAL OF 3 DOSES IN 15 MINUTES    pyridoxine, vitamin B6, (VITAMIN B-6) 100 MG Tab Take 100 mg by mouth once daily.     No current facility-administered medications  "for this visit.      Facility-Administered Medications Ordered in Other Visits   Medication Frequency    0.9%  NaCl infusion Continuous PRN       SOCIAL HISTORY  Social History     Tobacco Use    Smoking status: Never Smoker    Smokeless tobacco: Never Used   Substance Use Topics    Alcohol use: Yes     Comment: occasionally drinks    Drug use: No       Living arrangements - the patient lives at home with his wife.     ROS:    No flowsheet data found.    Fatigue: Energy level has been ok, but endurance is not as good since he is only getting therapy once a week.   Sleep: Sleeping well  Bladder: Wears diapers; has issues with control. Does feel when he has to urinate; Denies UTIs.   Bowels: Regular right now, but this is variable. Defecates in the diaper, but transfers to toilet when his aide is present.   Vision: Denies any vision issues, but his eyes are "not as good as they used to be."  Spasticity: no spasms  Pain: None currently; some lower back pain when he is moving around in bed   Cognitive: None  Mood: denies anxiety or depression, but gets "pissed off" sometimes   Walk: non-ambulatory; uses wheelchair  Paresthesias: none   Speech: occasional slurred speech; his wife notices this more in the morning and when Michael is tired   Swallowing: Has a "drip" in his throat that makes him cough sometimes, and he can't eat/swallow when this is happening; he is taking nasal spray and other allergy medications   Adaptive Needs: none  Heat sensitivity: yes, but he avoids the heat   Marijuana use: none  No recent infections or hospitalizations.       Objective:        1. 25 foot timed walk: He does not walk   No flowsheet data found.      Neurologic Exam    He is in a power chair and is not able to walk; he is unable to transfer independently    MENTAL STATUS: Grossly intact.     CRANIAL NERVE EXAM: There is no internuclear ophthalmoplegia. Pupils are equal, round and reactive to light. No dysarthria. Tongue is " midline. Hearing is intact.     MOTOR EXAM: Shows increased tone in the legs. He has a right hemiparesis, much worse than the left. The right upper extremity has proximal muscle strength of 3/5 in the deltoid and triceps; Left upper extremity is 4+/5 in all groups.  Right lower extremity, 1/5 at the hip flexor, 1/5 at the knee flexor.  Left lower extremity hip flexion, knee flexion and dorsiflexion are all 4-/5. He has significant spasticity (hard to bend right arm and bilateral legs).     REFLEXES: 1+ and symmetric in bilateral upper extremities and bilateral patella     SENSORY EXAM: Shows decreased vibration sense in all four limbs, right greater than left. He can feel pressure of the tuning fork, but no vibration in right leg.      Imaging:       Results for orders placed during the hospital encounter of 03/03/20   MRI Brain Demyelinating W W/O Contrast    Impression Significant motion degradation.    White matter changes as described above, not significantly changed compared to the previous examination.    Involutional changes.      Electronically signed by: Yaneli Cantu IV., MD  Date:    03/03/2020  Time:    13:46     Images reviewed with the patient.       Labs:     Lab Results   Component Value Date    BKXMWNRD94KQ 29 (L) 02/20/2019    MUSTIFAF31YZ 83 06/29/2015    FQZIZXYT63SQ 80 04/15/2014     Lab Results   Component Value Date    JCVINDEX 0.50 (A) 05/11/2016    JCVANTIBODY Positive (A) 05/11/2016     Lab Results   Component Value Date    BC8CGKZD 65.6 05/11/2016    ABSOLUTECD3 476 (L) 05/11/2016    RI1FWMQK 21.2 05/11/2016    ABSOLUTECD8 154 (L) 05/11/2016    OU3ZGPEK 44.2 05/11/2016    ABSOLUTECD4 321 05/11/2016    LABCD48 2.09 05/11/2016     Lab Results   Component Value Date    WBC 7.39 01/08/2020    RBC 4.94 01/08/2020    HGB 15.0 01/08/2020    HCT 47.3 01/08/2020    MCV 96 01/08/2020    MCH 30.4 01/08/2020    MCHC 31.7 (L) 01/08/2020    RDW 13.3 01/08/2020     01/08/2020    MPV 11.4 01/08/2020     GRAN 5.7 01/08/2020    GRAN 77.0 (H) 01/08/2020    LYMPH 0.7 (L) 01/08/2020    LYMPH 9.5 (L) 01/08/2020    MONO 0.7 01/08/2020    MONO 9.7 01/08/2020    EOS 0.2 01/08/2020    BASO 0.02 01/08/2020    EOSINOPHIL 3.1 01/08/2020    BASOPHIL 0.3 01/08/2020     Sodium   Date Value Ref Range Status   01/07/2020 139 136 - 145 mmol/L Final     Potassium   Date Value Ref Range Status   01/07/2020 4.3 3.5 - 5.1 mmol/L Final     Chloride   Date Value Ref Range Status   01/07/2020 106 95 - 110 mmol/L Final     CO2   Date Value Ref Range Status   01/07/2020 24 23 - 29 mmol/L Final     Glucose   Date Value Ref Range Status   01/07/2020 96 70 - 110 mg/dL Final     BUN, Bld   Date Value Ref Range Status   01/07/2020 16 8 - 23 mg/dL Final     Creatinine   Date Value Ref Range Status   01/07/2020 0.8 0.5 - 1.4 mg/dL Final   11/03/2012 0.9 0.5 - 1.4 mg/dL Final     Calcium   Date Value Ref Range Status   01/07/2020 9.7 8.7 - 10.5 mg/dL Final   11/03/2012 9.8 8.7 - 10.5 mg/dL Final     Total Protein   Date Value Ref Range Status   01/07/2020 6.7 6.0 - 8.4 g/dL Final     Albumin   Date Value Ref Range Status   01/07/2020 3.9 3.5 - 5.2 g/dL Final     Total Bilirubin   Date Value Ref Range Status   01/07/2020 0.7 0.1 - 1.0 mg/dL Final     Comment:     For infants and newborns, interpretation of results should be based  on gestational age, weight and in agreement with clinical  observations.  Premature Infant recommended reference ranges:  Up to 24 hours.............<8.0 mg/dL  Up to 48 hours............<12.0 mg/dL  3-5 days..................<15.0 mg/dL  6-29 days.................<15.0 mg/dL       Alkaline Phosphatase   Date Value Ref Range Status   01/07/2020 85 55 - 135 U/L Final     AST   Date Value Ref Range Status   01/07/2020 41 (H) 10 - 40 U/L Final     ALT   Date Value Ref Range Status   01/07/2020 61 (H) 10 - 44 U/L Final     Anion Gap   Date Value Ref Range Status   01/07/2020 9 8 - 16 mmol/L Final   11/03/2012 13 5 - 15 meq/L  Final     eGFR if    Date Value Ref Range Status   01/07/2020 >60.0 >60 mL/min/1.73 m^2 Final     eGFR if non    Date Value Ref Range Status   01/07/2020 >60.0 >60 mL/min/1.73 m^2 Final     Comment:     Calculation used to obtain the estimated glomerular filtration  rate (eGFR) is the CKD-EPI equation.        Lab Results   Component Value Date    HEPBSAG Negative 01/08/2020    HEPBSAB Positive (A) 01/08/2020    HEPBCAB Negative 01/08/2020           MS Impression and Plan:     NEURO MULTIPLE SCLEROSIS IMPRESSION:   MS Status:     Number of relapses in the past year?:  0    Clinical Progression:  Clinically Stable    MRI Progression:  Stable  Plan:     DMT:  No change in management    DMT comment:  Continue Ocrevus. His next infusion will be in early September. Safety labs for CBC, CD20, and hepatitis B will be in early August. Will also check Vit D. He is aware of the risks associated with immunosuppressant therapy, including increased risk of infection. Discussed importance of getting flu shot this season. He will also receive hep A #2 vaccine today.     Symptom Management:  No change in symptom management     Mr. German has advanced disability from multiple sclerosis, but is clinically stable on Ocrevus.       Our visit today lasted 40 minutes, and 100% of this time was spent face to face with the patient. Over 50% of this visit included discussion of the treatment plan/exam findings/imaging results/coordination of care. The patient agrees with the plan of care. He will follow up with Dr. Lyon in August.     Problem List Items Addressed This Visit        Neurologic Problems    Multiple sclerosis - Primary    Relevant Orders    CBC auto differential    Rituxan Sensitivity    Hepatitis B core antibody, total    Hepatitis B surface antibody    Hepatitis B Surface Antigen    Vitamin D    Vitamin B12      Other Visit Diagnoses     Other long term (current) drug therapy         Relevant  Orders    Vitamin D    Vitamin B12            JORDAN Mccauley, CNS

## 2020-03-18 ENCOUNTER — TELEPHONE (OUTPATIENT)
Dept: NEUROLOGY | Facility: CLINIC | Age: 65
End: 2020-03-18

## 2020-05-22 ENCOUNTER — OFFICE VISIT (OUTPATIENT)
Dept: PODIATRY | Facility: CLINIC | Age: 65
End: 2020-05-22
Payer: MEDICARE

## 2020-05-22 VITALS
HEIGHT: 68 IN | DIASTOLIC BLOOD PRESSURE: 85 MMHG | HEART RATE: 70 BPM | SYSTOLIC BLOOD PRESSURE: 135 MMHG | BODY MASS INDEX: 28.12 KG/M2 | TEMPERATURE: 98 F

## 2020-05-22 DIAGNOSIS — S91.209A AVULSION OF TOENAIL, INITIAL ENCOUNTER: Primary | ICD-10-CM

## 2020-05-22 DIAGNOSIS — L60.2 ONYCHOGRYPOSIS: ICD-10-CM

## 2020-05-22 DIAGNOSIS — G82.20 PARAPARESIS: ICD-10-CM

## 2020-05-22 PROCEDURE — 3008F PR BODY MASS INDEX (BMI) DOCUMENTED: ICD-10-PCS | Mod: S$GLB,,, | Performed by: PODIATRIST

## 2020-05-22 PROCEDURE — 11721 ROUTINE FOOT CARE: ICD-10-PCS | Mod: Q9,S$GLB,, | Performed by: PODIATRIST

## 2020-05-22 PROCEDURE — 99213 PR OFFICE/OUTPT VISIT, EST, LEVL III, 20-29 MIN: ICD-10-PCS | Mod: 25,S$GLB,, | Performed by: PODIATRIST

## 2020-05-22 PROCEDURE — 11721 DEBRIDE NAIL 6 OR MORE: CPT | Mod: Q9,S$GLB,, | Performed by: PODIATRIST

## 2020-05-22 PROCEDURE — 3008F BODY MASS INDEX DOCD: CPT | Mod: S$GLB,,, | Performed by: PODIATRIST

## 2020-05-22 PROCEDURE — 3075F PR MOST RECENT SYSTOLIC BLOOD PRESS GE 130-139MM HG: ICD-10-PCS | Mod: S$GLB,,, | Performed by: PODIATRIST

## 2020-05-22 PROCEDURE — 3079F DIAST BP 80-89 MM HG: CPT | Mod: S$GLB,,, | Performed by: PODIATRIST

## 2020-05-22 PROCEDURE — 3079F PR MOST RECENT DIASTOLIC BLOOD PRESSURE 80-89 MM HG: ICD-10-PCS | Mod: S$GLB,,, | Performed by: PODIATRIST

## 2020-05-22 PROCEDURE — 99213 OFFICE O/P EST LOW 20 MIN: CPT | Mod: 25,S$GLB,, | Performed by: PODIATRIST

## 2020-05-22 PROCEDURE — 3075F SYST BP GE 130 - 139MM HG: CPT | Mod: S$GLB,,, | Performed by: PODIATRIST

## 2020-05-22 RX ORDER — DOXYCYCLINE 100 MG/1
100 CAPSULE ORAL 2 TIMES DAILY
Qty: 10 CAPSULE | Refills: 0 | Status: SHIPPED | OUTPATIENT
Start: 2020-05-22 | End: 2020-05-27

## 2020-05-22 NOTE — PROGRESS NOTES
"  [address]  [place] [address]  Phone: [phone]   [phone]    Patient's PCP:Av Zaragoza MD  Referring Provider: No ref. provider found    Subjective:      Chief Complaint:: Toe Injury (right 1st) and Nail Care    HPI  Michael German is a 64 y.o. male who presents with a complaint of "right 1st toenail hanging lasting since yesterday" & nail trim. Onset of the symptoms was "toenail got caught on sock and tugged toenail off."  Current symptoms include bleeding, red.  Treatment to date have included peroxide. Patients rates pain 2-3/10 on pain scale.    Systemic Doctor:  Dr. Delbert Sellers (cardiologist)  Date last seen: 03/2020    Vitals:    05/22/20 1025   BP: 135/85   Pulse: 70   Temp: 97.9 °F (36.6 °C)     Shoe Size:     Past Surgical History:   Procedure Laterality Date    COLONOSCOPY N/A 1/31/2019    Procedure: COLONOSCOPY;  Surgeon: Luis James MD;  [facility];  Service: Endoscopy;  Laterality: N/A;    FINGER SURGERY      LASER OF PROSTATE W/ GREEN LIGHT PVP      RECTAL BOTOX INJECTION      SPINAL FUSION       Past Medical History:   Diagnosis Date    Acute urinary retention     Acute UTI     treated twice in past three weeks with antibx per patient-since indwelling catheter    Back problem     herniated disc-lumbar region    GERD (gastroesophageal reflux disease)     Hypertension     130s/80s    MS (multiple sclerosis)     Neck stiffness     Neurogenic bladder     has indwelling catheter now    Sleep disturbance     Snoring     Visual impairment      Family History   Problem Relation Age of Onset    Dementia Mother     Parkinsonism Father     No Known Problems Sister     Hypertension Brother     Anesthesia problems Neg Hx         Social History:   Marital Status:   Alcohol History:  reports that he drinks alcohol.  Tobacco History:  reports that he has never smoked. He has never used smokeless tobacco.  Drug History:  reports " that he does not use drugs.    Review of patient's allergies indicates:  No Known Allergies    Current Outpatient Medications   Medication Sig Dispense Refill    ascorbic acid, vitamin C, (VITAMIN C) 1000 MG tablet Take 1,000 mg by mouth once daily.      atorvastatin (LIPITOR) 20 MG tablet Take 20 mg by mouth every evening.  5    BRILINTA 90 mg tablet Take 90 mg by mouth every 12 (twelve) hours.  5    calcium carbonate (TUMS) 200 mg calcium (500 mg) chewable tablet Take 2 tablets by mouth 3 (three) times daily as needed for Heartburn.      carvedilol (COREG) 6.25 MG tablet Take 6.25 mg by mouth 2 (two) times daily.  5    cyanocobalamin (VITAMIN B-12) 1000 MCG tablet Take 5,000 mcg by mouth once daily.      losartan (COZAAR) 100 MG tablet Take 100 mg by mouth once daily.      nitroGLYCERIN (NITROSTAT) 0.4 MG SL tablet DISSOLVE ONE TABLET UNDER THE TONGUE EVERY 5 MINUTES AS NEEDED FOR CHEST PAIN. DO NOT EXCEED A TOTAL OF 3 DOSES IN 15 MINUTES  5    pantoprazole (PROTONIX) 40 MG tablet Take 40 mg by mouth once daily.      pyridoxine, vitamin B6, (VITAMIN B-6) 100 MG Tab Take 100 mg by mouth once daily.      aspirin (ECOTRIN) 81 MG EC tablet Take 81 mg by mouth once daily.      doxycycline (MONODOX) 100 MG capsule Take 1 capsule (100 mg total) by mouth 2 (two) times daily. for 5 days 10 capsule 0    lactulose (CHRONULAC) 10 gram/15 mL solution Take 30 g by mouth as needed.        No current facility-administered medications for this visit.      Facility-Administered Medications Ordered in Other Visits   Medication Dose Route Frequency Provider Last Rate Last Dose    0.9%  NaCl infusion    Continuous PRN Annalee Romero CRNA   Stopped at 04/19/16 0715       Review of Systems      Objective:        Physical Exam:   Foot Exam    General  Orientation: alert and oriented to person, place, and time   Affect: appropriate   Assistance: wheelchair use       Right Foot/Ankle     Inspection and  Palpation  Ecchymosis: none  Tenderness: none   Swelling: (Mild edema lower extremity)  Arch: pes planus  Skin Exam: dry skin;     Neurovascular  Dorsalis pedis: 1+  Posterior tibial: 1+  Saphenous nerve sensation: diminished  Tibial nerve sensation: diminished  Superficial peroneal nerve sensation: diminished  Deep peroneal nerve sensation: diminished  Sural nerve sensation: diminished    Muscle Strength  Ankle dorsiflexion: 3  Ankle plantar flexion: 3  Ankle inversion: 3  Ankle eversion: 3  Great toe extension: 3  Great toe flexion: 3    Comments  Nails 1 through 5 are thickened, discolored, dystrophic, and elongated with subungual debris    Skin is thin shiny and atrophic with absent hair growth    The great toenail has been traumatically avulse to however is still attached by a small portion of skin.  I was able to fully remove it with a nail Nipper without requiring anesthesia.  Underlying nail bed is intact.  No signs of infection.    Left Foot/Ankle      Inspection and Palpation  Ecchymosis: none  Tenderness: none   Swelling: (Mild edema to the lower extremity)  Arch: pes planus  Skin Exam: dry skin;     Neurovascular  Dorsalis pedis: 1+  Posterior tibial: 1+  Saphenous nerve sensation: diminished  Tibial nerve sensation: diminished  Superficial peroneal nerve sensation: diminished  Deep peroneal nerve sensation: diminished  Sural nerve sensation: diminished    Muscle Strength  Ankle dorsiflexion: 3  Ankle plantar flexion: 3  Ankle inversion: 3  Ankle eversion: 3  Great toe extension: 3  Great toe flexion: 3    Comments  Nails 1 through 5 are thickened, discolored, dystrophic, and elongated with subungual debris    Skin is thin shiny and atrophic with absent hair growth    Physical Exam   Cardiovascular:   Pulses:       Dorsalis pedis pulses are 1+ on the right side, and 1+ on the left side.        Posterior tibial pulses are 1+ on the right side, and 1+ on the left side.   Feet:   Right Foot:   Skin  "Integrity: Positive for dry skin.   Left Foot:   Skin Integrity: Positive for dry skin.       Imaging: none            Assessment:       1. Avulsion of toenail, initial encounter    2. Onychogryposis    3. Paraparesis      Plan:   Avulsion of toenail, initial encounter  -     doxycycline (MONODOX) 100 MG capsule; Take 1 capsule (100 mg total) by mouth 2 (two) times daily. for 5 days  Dispense: 10 capsule; Refill: 0    Onychogryposis  -     Routine Foot Care    Paraparesis  -     Routine Foot Care      Follow up in about 3 months (around 8/22/2020), or if symptoms worsen or fail to improve.    Routine Foot Care  Date/Time: 5/22/2020 10:00 AM  Performed by: Gianni Morgan DPM  Authorized by: Gianni Morgan DPM     Time out: Immediately prior to procedure a "time out" was called to verify the correct patient, procedure, equipment, support staff and site/side marked as required.    Consent Done?:  Not Needed  Hyperkeratotic Skin Lesions?: No      Nail Care Type:  Debride(Left 1st Toe, Left 2nd Toe, Left 3rd Toe, Left 4th Toe, Left 5th Toe, Right 2nd Toe, Right 3rd Toe, Right 4th Toe and Right 5th Toe)  Patient tolerance:  Patient tolerated the procedure well with no immediate complications     Instruments Used: Nail Nipper   Manually reduced with electric .         Recommend patient dress the right great toe daily with Betadine and gauze.  I am also sending an antibiotic to take for several days.    Counseling:     I provided patient education verbally regarding:   Patient diagnosis, treatment options, as well as alternatives, risks, and benefits.     This note was created using Dragon voice recognition software that occasionally misinterpreted phrases or words.               "

## 2020-07-10 ENCOUNTER — TELEPHONE (OUTPATIENT)
Dept: NEUROLOGY | Facility: CLINIC | Age: 65
End: 2020-07-10

## 2020-07-10 NOTE — TELEPHONE ENCOUNTER
----- Message from Nasrin Sanches sent at 7/10/2020 12:44 PM CDT -----  Regarding: appointment  Contact: Bindu wife@ [phone]  Calling to schedule an appt with Dr. Lyon per the recall letter. Please call.

## 2020-08-17 ENCOUNTER — LAB VISIT (OUTPATIENT)
Dept: LAB | Facility: HOSPITAL | Age: 65
End: 2020-08-17
Attending: CLINICAL NURSE SPECIALIST
Payer: MEDICARE

## 2020-08-17 DIAGNOSIS — G35 MULTIPLE SCLEROSIS: ICD-10-CM

## 2020-08-17 DIAGNOSIS — Z79.899 OTHER LONG TERM (CURRENT) DRUG THERAPY: ICD-10-CM

## 2020-08-17 LAB
BASOPHILS # BLD AUTO: 0.03 K/UL (ref 0–0.2)
BASOPHILS NFR BLD: 0.4 % (ref 0–1.9)
DIFFERENTIAL METHOD: ABNORMAL
EOSINOPHIL # BLD AUTO: 0.2 K/UL (ref 0–0.5)
EOSINOPHIL NFR BLD: 2.1 % (ref 0–8)
ERYTHROCYTE [DISTWIDTH] IN BLOOD BY AUTOMATED COUNT: 13.4 % (ref 11.5–14.5)
HCT VFR BLD AUTO: 50.7 % (ref 40–54)
HGB BLD-MCNC: 15.8 G/DL (ref 14–18)
IMM GRANULOCYTES # BLD AUTO: 0.04 K/UL (ref 0–0.04)
IMM GRANULOCYTES NFR BLD AUTO: 0.6 % (ref 0–0.5)
LYMPHOCYTES # BLD AUTO: 0.7 K/UL (ref 1–4.8)
LYMPHOCYTES NFR BLD: 10.2 % (ref 18–48)
MCH RBC QN AUTO: 29.9 PG (ref 27–31)
MCHC RBC AUTO-ENTMCNC: 31.2 G/DL (ref 32–36)
MCV RBC AUTO: 96 FL (ref 82–98)
MONOCYTES # BLD AUTO: 0.7 K/UL (ref 0.3–1)
MONOCYTES NFR BLD: 9.5 % (ref 4–15)
NEUTROPHILS # BLD AUTO: 5.6 K/UL (ref 1.8–7.7)
NEUTROPHILS NFR BLD: 77.2 % (ref 38–73)
NRBC BLD-RTO: 0 /100 WBC
PLATELET # BLD AUTO: 228 K/UL (ref 150–350)
PMV BLD AUTO: 11.4 FL (ref 9.2–12.9)
RBC # BLD AUTO: 5.28 M/UL (ref 4.6–6.2)
WBC # BLD AUTO: 7.24 K/UL (ref 3.9–12.7)

## 2020-08-17 PROCEDURE — 82607 VITAMIN B-12: CPT

## 2020-08-17 PROCEDURE — 86704 HEP B CORE ANTIBODY TOTAL: CPT

## 2020-08-17 PROCEDURE — 86356 MONONUCLEAR CELL ANTIGEN: CPT

## 2020-08-17 PROCEDURE — 85025 COMPLETE CBC W/AUTO DIFF WBC: CPT

## 2020-08-17 PROCEDURE — 87340 HEPATITIS B SURFACE AG IA: CPT

## 2020-08-17 PROCEDURE — 36415 COLL VENOUS BLD VENIPUNCTURE: CPT | Mod: PO

## 2020-08-17 PROCEDURE — 82306 VITAMIN D 25 HYDROXY: CPT

## 2020-08-17 PROCEDURE — 86706 HEP B SURFACE ANTIBODY: CPT

## 2020-08-18 LAB
25(OH)D3+25(OH)D2 SERPL-MCNC: 27 NG/ML (ref 30–96)
HBV CORE AB SERPL QL IA: NEGATIVE
HBV SURFACE AB SER-ACNC: POSITIVE M[IU]/ML
HBV SURFACE AG SERPL QL IA: NEGATIVE
VIT B12 SERPL-MCNC: 908 PG/ML (ref 210–950)

## 2020-08-19 LAB
APPEARANCE SPEC: NORMAL
CD20 CELLS NFR SPEC: NORMAL %
SPECIMEN SOURCE: NORMAL
VIABLE CELLS NFR SPEC: 100 %

## 2020-09-09 ENCOUNTER — LAB VISIT (OUTPATIENT)
Dept: LAB | Facility: HOSPITAL | Age: 65
End: 2020-09-09
Attending: SPECIALIST
Payer: MEDICARE

## 2020-09-09 DIAGNOSIS — Z79.899 ENCOUNTER FOR LONG-TERM (CURRENT) USE OF OTHER MEDICATIONS: ICD-10-CM

## 2020-09-09 DIAGNOSIS — E78.9 DISORDER OF LIPOPROTEIN AND LIPID METABOLISM: ICD-10-CM

## 2020-09-09 DIAGNOSIS — I20.0 INTERMEDIATE CORONARY SYNDROME: Primary | ICD-10-CM

## 2020-09-09 DIAGNOSIS — R07.89 OTHER CHEST PAIN: ICD-10-CM

## 2020-09-09 LAB
ALBUMIN SERPL BCP-MCNC: 3.9 G/DL (ref 3.5–5.2)
ALP SERPL-CCNC: 104 U/L (ref 55–135)
ALT SERPL W/O P-5'-P-CCNC: 42 U/L (ref 10–44)
ANION GAP SERPL CALC-SCNC: 11 MMOL/L (ref 8–16)
AST SERPL-CCNC: 29 U/L (ref 10–40)
BILIRUB SERPL-MCNC: 0.6 MG/DL (ref 0.1–1)
BUN SERPL-MCNC: 14 MG/DL (ref 8–23)
CALCIUM SERPL-MCNC: 9.3 MG/DL (ref 8.7–10.5)
CHLORIDE SERPL-SCNC: 102 MMOL/L (ref 95–110)
CHOLEST SERPL-MCNC: 120 MG/DL (ref 120–199)
CHOLEST/HDLC SERPL: 2.7 {RATIO} (ref 2–5)
CO2 SERPL-SCNC: 27 MMOL/L (ref 23–29)
CREAT SERPL-MCNC: 0.8 MG/DL (ref 0.5–1.4)
ERYTHROCYTE [DISTWIDTH] IN BLOOD BY AUTOMATED COUNT: 13.4 % (ref 11.5–14.5)
EST. GFR  (AFRICAN AMERICAN): >60 ML/MIN/1.73 M^2
EST. GFR  (NON AFRICAN AMERICAN): >60 ML/MIN/1.73 M^2
GLUCOSE SERPL-MCNC: 84 MG/DL (ref 70–110)
HCT VFR BLD AUTO: 46.1 % (ref 40–54)
HDLC SERPL-MCNC: 45 MG/DL (ref 40–75)
HDLC SERPL: 37.5 % (ref 20–50)
HGB BLD-MCNC: 14.8 G/DL (ref 14–18)
LDLC SERPL CALC-MCNC: 63.6 MG/DL (ref 63–159)
MCH RBC QN AUTO: 30.2 PG (ref 27–31)
MCHC RBC AUTO-ENTMCNC: 32.1 G/DL (ref 32–36)
MCV RBC AUTO: 94 FL (ref 82–98)
NONHDLC SERPL-MCNC: 75 MG/DL
PLATELET # BLD AUTO: 224 K/UL (ref 150–350)
PMV BLD AUTO: 11.1 FL (ref 9.2–12.9)
POTASSIUM SERPL-SCNC: 4.3 MMOL/L (ref 3.5–5.1)
PROT SERPL-MCNC: 6.5 G/DL (ref 6–8.4)
RBC # BLD AUTO: 4.9 M/UL (ref 4.6–6.2)
SODIUM SERPL-SCNC: 140 MMOL/L (ref 136–145)
TRIGL SERPL-MCNC: 57 MG/DL (ref 30–150)
WBC # BLD AUTO: 9.23 K/UL (ref 3.9–12.7)

## 2020-09-09 PROCEDURE — 80053 COMPREHEN METABOLIC PANEL: CPT

## 2020-09-09 PROCEDURE — 85027 COMPLETE CBC AUTOMATED: CPT

## 2020-09-09 PROCEDURE — 36415 COLL VENOUS BLD VENIPUNCTURE: CPT

## 2020-09-09 PROCEDURE — 80061 LIPID PANEL: CPT

## 2020-09-11 ENCOUNTER — INFUSION (OUTPATIENT)
Dept: INFUSION THERAPY | Facility: HOSPITAL | Age: 65
End: 2020-09-11
Attending: PSYCHIATRY & NEUROLOGY
Payer: MEDICARE

## 2020-09-11 VITALS
HEART RATE: 81 BPM | RESPIRATION RATE: 18 BRPM | TEMPERATURE: 98 F | DIASTOLIC BLOOD PRESSURE: 93 MMHG | OXYGEN SATURATION: 93 % | BODY MASS INDEX: 27.37 KG/M2 | SYSTOLIC BLOOD PRESSURE: 150 MMHG | WEIGHT: 180 LBS

## 2020-09-11 DIAGNOSIS — G35 MULTIPLE SCLEROSIS: Primary | ICD-10-CM

## 2020-09-11 PROCEDURE — 63600175 PHARM REV CODE 636 W HCPCS: Performed by: PSYCHIATRY & NEUROLOGY

## 2020-09-11 PROCEDURE — 96367 TX/PROPH/DG ADDL SEQ IV INF: CPT

## 2020-09-11 PROCEDURE — S0028 INJECTION, FAMOTIDINE, 20 MG: HCPCS | Performed by: PSYCHIATRY & NEUROLOGY

## 2020-09-11 PROCEDURE — 96413 CHEMO IV INFUSION 1 HR: CPT

## 2020-09-11 PROCEDURE — 96375 TX/PRO/DX INJ NEW DRUG ADDON: CPT

## 2020-09-11 PROCEDURE — 96415 CHEMO IV INFUSION ADDL HR: CPT

## 2020-09-11 PROCEDURE — 25000003 PHARM REV CODE 250: Performed by: PSYCHIATRY & NEUROLOGY

## 2020-09-11 RX ORDER — FAMOTIDINE 10 MG/ML
20 INJECTION INTRAVENOUS
Status: CANCELLED | OUTPATIENT
Start: 2021-02-05

## 2020-09-11 RX ORDER — DIPHENHYDRAMINE HYDROCHLORIDE 50 MG/ML
50 INJECTION INTRAMUSCULAR; INTRAVENOUS
Status: CANCELLED | OUTPATIENT
Start: 2021-02-05

## 2020-09-11 RX ORDER — SODIUM CHLORIDE 0.9 % (FLUSH) 0.9 %
10 SYRINGE (ML) INJECTION
Status: CANCELLED | OUTPATIENT
Start: 2021-02-05

## 2020-09-11 RX ORDER — ACETAMINOPHEN 500 MG
1000 TABLET ORAL
Status: COMPLETED | OUTPATIENT
Start: 2020-09-11 | End: 2020-09-11

## 2020-09-11 RX ORDER — EPINEPHRINE 0.3 MG/.3ML
0.3 INJECTION SUBCUTANEOUS
Status: CANCELLED | OUTPATIENT
Start: 2021-02-05

## 2020-09-11 RX ORDER — HEPARIN 100 UNIT/ML
500 SYRINGE INTRAVENOUS
Status: CANCELLED | OUTPATIENT
Start: 2021-02-05

## 2020-09-11 RX ORDER — HEPARIN 100 UNIT/ML
500 SYRINGE INTRAVENOUS
Status: DISCONTINUED | OUTPATIENT
Start: 2020-09-11 | End: 2020-09-11 | Stop reason: HOSPADM

## 2020-09-11 RX ORDER — DIPHENHYDRAMINE HYDROCHLORIDE 50 MG/ML
50 INJECTION INTRAMUSCULAR; INTRAVENOUS
Status: DISCONTINUED | OUTPATIENT
Start: 2020-09-11 | End: 2020-09-11 | Stop reason: HOSPADM

## 2020-09-11 RX ORDER — SODIUM CHLORIDE 0.9 % (FLUSH) 0.9 %
10 SYRINGE (ML) INJECTION
Status: DISCONTINUED | OUTPATIENT
Start: 2020-09-11 | End: 2020-09-11 | Stop reason: HOSPADM

## 2020-09-11 RX ORDER — EPINEPHRINE 0.3 MG/.3ML
0.3 INJECTION SUBCUTANEOUS
Status: DISCONTINUED | OUTPATIENT
Start: 2020-09-11 | End: 2020-09-11 | Stop reason: HOSPADM

## 2020-09-11 RX ORDER — ACETAMINOPHEN 500 MG
1000 TABLET ORAL
Status: CANCELLED | OUTPATIENT
Start: 2021-02-05

## 2020-09-11 RX ORDER — FAMOTIDINE 10 MG/ML
20 INJECTION INTRAVENOUS
Status: COMPLETED | OUTPATIENT
Start: 2020-09-11 | End: 2020-09-11

## 2020-09-11 RX ADMIN — DIPHENHYDRAMINE HYDROCHLORIDE 50 MG: 50 INJECTION INTRAMUSCULAR; INTRAVENOUS at 10:09

## 2020-09-11 RX ADMIN — OCRELIZUMAB 600 MG: 300 INJECTION INTRAVENOUS at 11:09

## 2020-09-11 RX ADMIN — FAMOTIDINE 20 MG: 10 INJECTION INTRAVENOUS at 10:09

## 2020-09-11 RX ADMIN — DEXTROSE: 50 INJECTION, SOLUTION INTRAVENOUS at 11:09

## 2020-09-11 RX ADMIN — ACETAMINOPHEN 1000 MG: 500 TABLET ORAL at 10:09

## 2020-09-11 NOTE — PLAN OF CARE
Problem: Infection  Goal: Infection Symptom Resolution  Outcome: Ongoing, Progressing  Intervention: Prevent or Manage Infection  Flowsheets (Taken 9/11/2020 1010)  Infection Management: aseptic technique maintained  Isolation Precautions: protective environment maintained

## 2020-09-24 DIAGNOSIS — I87.2 PERIPHERAL VENOUS INSUFFICIENCY: Primary | ICD-10-CM

## 2020-10-23 ENCOUNTER — HOSPITAL ENCOUNTER (OUTPATIENT)
Dept: RADIOLOGY | Facility: HOSPITAL | Age: 65
Discharge: HOME | End: 2020-10-23
Attending: SPECIALIST
Payer: MEDICARE

## 2020-10-23 DIAGNOSIS — I87.2 PERIPHERAL VENOUS INSUFFICIENCY: ICD-10-CM

## 2020-10-23 PROCEDURE — 93970 EXTREMITY STUDY: CPT | Mod: TC

## 2020-10-23 PROCEDURE — 93970 EXTREMITY STUDY: CPT | Mod: 26,,, | Performed by: RADIOLOGY

## 2020-10-23 PROCEDURE — 93970 US LOWER EXTREMITY VEINS BILATERAL INSUFFICIENCY: ICD-10-PCS | Mod: 26,,, | Performed by: RADIOLOGY

## 2020-11-02 ENCOUNTER — PATIENT MESSAGE (OUTPATIENT)
Dept: PSYCHIATRY | Facility: CLINIC | Age: 65
End: 2020-11-02

## 2020-12-28 ENCOUNTER — EXTERNAL VISIT (OUTPATIENT)
Dept: PRIMARY CARE CLINIC | Facility: CLINIC | Age: 65
End: 2020-12-28
Payer: MEDICARE

## 2020-12-28 DIAGNOSIS — D84.9 IMMUNOSUPPRESSION: ICD-10-CM

## 2020-12-28 DIAGNOSIS — G35 MULTIPLE SCLEROSIS: ICD-10-CM

## 2020-12-28 DIAGNOSIS — G82.20 PARAPARESIS: ICD-10-CM

## 2020-12-28 DIAGNOSIS — R23.9 SUSPECTED PRESSURE INJURY OF DEEP TISSUE: ICD-10-CM

## 2020-12-28 DIAGNOSIS — I70.0 ABDOMINAL AORTIC ATHEROSCLEROSIS: ICD-10-CM

## 2020-12-28 DIAGNOSIS — N31.9 NEUROGENIC BLADDER: ICD-10-CM

## 2020-12-28 DIAGNOSIS — I25.118 CORONARY ARTERY DISEASE OF NATIVE ARTERY OF NATIVE HEART WITH STABLE ANGINA PECTORIS: ICD-10-CM

## 2020-12-28 DIAGNOSIS — I10 ESSENTIAL HYPERTENSION: Primary | ICD-10-CM

## 2020-12-28 PROCEDURE — 99350 HOME/RES VST EST HIGH MDM 60: CPT | Mod: ,,, | Performed by: NURSE PRACTITIONER

## 2020-12-28 PROCEDURE — 99350 PR HOME VISIT,ESTAB PATIENT,LEVEL IV: ICD-10-PCS | Mod: ,,, | Performed by: NURSE PRACTITIONER

## 2020-12-28 NOTE — PROGRESS NOTES
Primary Care Provider Appointment    Subjective:      Patient ID: Michael German is a 65 y.o. male.     Chief Complaint: Extremity Weakness and Establish Care    Pt is being seen at home due to physical debility that presents a taxing effort to leave the home, to mitigate high risk of hospital readmission and/or due to the limited availability of reliable or safe options for transportation to the point of access to the provider. Prior to treatment on this visit the chart was reviewed and patient consent was obtained.     Pt with MS, DDD, paraparesis, htn, and Immunosuppression seen in home to establish care.      Pt lives with wife in one story residential home. Wife Bindu is primary caretaker. Pt has been disabled since 1991 secondary to MS diagnosis. Has been in electric wheelchair since 2007.     Has [facility], aide that comes once weekly to shower pt, RN comes once weekly for wound care. Has pressure ulcer to buttock. Ongoing for 3 years now.      Has 2 daughters, Betty lives in [facility] and Lien lives in [place].     On Ocrevus, last dose September, next dose March. Has had 3 doses and have not seen any improvement. Pt has decided not to go get next dose due to cost.     Today complaining of decrease strength and weakness since stopping PT 6-8 weeks ago. Pt wants assistance with getting aide to come bathe second time a week and home PT. Pt states he feels it is very taxing to go to outpatient PT, reports it is a lot on his wife to secure him in the van, pt feels unsafe when he is in vehicle.     ADLs: Need moderate-max assist with all ADLs except feeding.     Equipment: electric wheelchair, neftaly lift, hospital bed, van with lift.     Followed by:  Podiatry: Dr. Gianni Morgan. Seen 5/22/2020.   Neurology: Dr. Kim Lyon/ [facility] Del[facility]. Seen 3/9/2020. Followed for MS. Clinically stable on Ocrevus. Next inustion early September. rtc in 6 mths.   Cardiology: Dr. Delbert Sellers. Will request records.        Past Surgical History:   Procedure Laterality Date    COLONOSCOPY N/A 1/31/2019    Procedure: COLONOSCOPY;  Surgeon: Luis James MD;  Location: [facility];  Service: Endoscopy;  Laterality: N/A;    CORONARY ANGIOPLASTY WITH STENT PLACEMENT  06/24/2019    proximal LAD     FINGER SURGERY      LASER OF PROSTATE W/ GREEN LIGHT PVP      RECTAL BOTOX INJECTION      SPINAL FUSION         Past Medical History:   Diagnosis Date    Acute urinary retention     Acute UTI     treated twice in past three weeks with antibx per patient-since indwelling catheter    Back problem     herniated disc-lumbar region    GERD (gastroesophageal reflux disease)     Hypertension     130s/80s    MS (multiple sclerosis)     Neck stiffness     Neurogenic bladder     has indwelling catheter now    Sleep disturbance     Snoring     Visual impairment        Social History     Socioeconomic History    Marital status:      Spouse name: Bindu German    Number of children: 2    Years of education: Not on file    Highest education level: Some college, no degree   Occupational History    Not on file   Social Needs    Financial resource strain: Somewhat hard    Food insecurity     Worry: Sometimes true     Inability: Sometimes true    Transportation needs     Medical: Yes     Non-medical: Yes   Tobacco Use    Smoking status: Never Smoker    Smokeless tobacco: Never Used   Substance and Sexual Activity    Alcohol use: Yes     Alcohol/week: 1.0 standard drinks     Types: 1 Cans of beer per week     Frequency: Monthly or less     Drinks per session: 1 or 2     Binge frequency: Never     Comment: occasionally drinks    Drug use: No    Sexual activity: Not on file   Lifestyle    Physical activity     Days per week: 0 days     Minutes per session: 0 min    Stress: To some extent   Relationships    Social connections     Talks on phone: Once a week     Gets together: Never     Attends Hinduism service: Never      Active member of club or organization: No     Attends meetings of clubs or organizations: Never     Relationship status:    Other Topics Concern    Not on file   Social History Narrative    65  y/o male, lives with home in one story residential home.     Has 2 daughters, Betty lives in [facility] and Lien lives in [place].         Yarsani: Caodaism            Review of Systems   Constitutional: Negative for appetite change, chills, diaphoresis, fever and unexpected weight change.   HENT: Positive for hearing loss and postnasal drip. Negative for congestion, sinus pressure and sore throat.         Right ear hearing loss   Eyes: Positive for visual disturbance. Negative for pain, discharge and redness.        Uses reading glasses    Respiratory: Positive for cough and shortness of breath. Negative for chest tightness, wheezing and stridor.         Chronic cough due to postnasal drip   JACK[place]   Cardiovascular: Positive for leg swelling. Negative for chest pain and palpitations.   Gastrointestinal: Positive for constipation and diarrhea. Negative for abdominal pain and nausea.        Occasional constipation and diarrhea   Endocrine: Negative for polydipsia, polyphagia and polyuria.   Genitourinary: Negative for difficulty urinating, dysuria and hematuria.        Urinary incontinence   Musculoskeletal: Positive for arthralgias. Negative for back pain, joint swelling and myalgias.        Bilateral shoulder    Skin: Positive for wound. Negative for pallor and rash.        Wound on buttock    Allergic/Immunologic: Positive for immunocompromised state. Negative for food allergies.   Neurological: Positive for tremors and weakness. Negative for dizziness, seizures, speech difficulty and light-headedness.        Tremors in R leg    Hematological: Does not bruise/bleed easily.   Psychiatric/Behavioral: Positive for agitation. Negative for confusion, dysphoric mood, hallucinations, self-injury, sleep disturbance  and suicidal ideas. The patient is nervous/anxious.        Objective:   /80 (BP Location: Left arm, Patient Position: Sitting, BP Method: Medium (Manual))   Pulse 74   SpO2 96%     Physical Exam  Constitutional:       General: He is not in acute distress.     Appearance: He is well-developed. He is obese. He is not diaphoretic.      Comments: Wheelchair dependent    HENT:      Right Ear: External ear normal.      Left Ear: External ear normal.   Eyes:      Conjunctiva/sclera: Conjunctivae normal.      Pupils: Pupils are equal, round, and reactive to light.   Neck:      Musculoskeletal: Neck rigidity present.      Vascular: No JVD.   Cardiovascular:      Rate and Rhythm: Normal rate and regular rhythm.      Heart sounds: Normal heart sounds. No murmur. No friction rub. No gallop.    Pulmonary:      Effort: Pulmonary effort is normal. No respiratory distress.      Breath sounds: Normal breath sounds. No stridor. No wheezing.   Abdominal:      General: Bowel sounds are normal. There is no distension.      Palpations: Abdomen is soft.      Tenderness: There is no abdominal tenderness.   Musculoskeletal:         General: Swelling present.      Comments: R sided rigidity and spasticity.   2+ pitting edema to BLE, R>L     Skin:     General: Skin is warm and dry.      Capillary Refill: Capillary refill takes 2 to 3 seconds.      Findings: Bruising present.      Comments: Suspected deep tissue injury to R buttock, nonblanchable (photo did not save)   Neurological:      Mental Status: He is alert and oriented to person, place, and time.      Motor: Weakness present.      Coordination: Coordination abnormal.      Gait: Gait abnormal.      Deep Tendon Reflexes: Reflexes abnormal.      Comments: R sided weakness, spasticity and rigidity    Psychiatric:         Judgment: Judgment normal.             Lab Results   Component Value Date    WBC 9.23 09/09/2020    HGB 14.8 09/09/2020    HCT 46.1 09/09/2020      09/09/2020    CHOL 120 09/09/2020    TRIG 57 09/09/2020    HDL 45 09/09/2020    ALT 42 09/09/2020    AST 29 09/09/2020     09/09/2020    K 4.3 09/09/2020     09/09/2020    CREATININE 0.8 09/09/2020    BUN 14 09/09/2020    CO2 27 09/09/2020    TSH 2.948 01/29/2019    PSA 0.97 08/05/2015    INR 1.0 08/01/2016       Medication List with Changes/Refills   Current Medications    ASCORBIC ACID, VITAMIN C, (VITAMIN C) 1000 MG TABLET    Take 1,000 mg by mouth once daily.    ATORVASTATIN (LIPITOR) 20 MG TABLET    Take 20 mg by mouth every evening.    BRILINTA 90 MG TABLET    Take 90 mg by mouth every 12 (twelve) hours.    CALCIUM CARBONATE (TUMS) 200 MG CALCIUM (500 MG) CHEWABLE TABLET    Take 2 tablets by mouth 3 (three) times daily as needed for Heartburn.    CARVEDILOL (COREG) 6.25 MG TABLET    Take 6.25 mg by mouth 2 (two) times daily.    CYANOCOBALAMIN (VITAMIN B-12) 1000 MCG TABLET    Take 5,000 mcg by mouth once daily.    LACTULOSE (CHRONULAC) 10 GRAM/15 ML SOLUTION    Take 30 g by mouth as needed.     LOSARTAN (COZAAR) 100 MG TABLET    Take 100 mg by mouth once daily.    NITROGLYCERIN (NITROSTAT) 0.4 MG SL TABLET    DISSOLVE ONE TABLET UNDER THE TONGUE EVERY 5 MINUTES AS NEEDED FOR CHEST PAIN. DO NOT EXCEED A TOTAL OF 3 DOSES IN 15 MINUTES    PANTOPRAZOLE (PROTONIX) 40 MG TABLET    Take 40 mg by mouth once daily.    PYRIDOXINE, VITAMIN B6, (VITAMIN B-6) 100 MG TAB    Take 100 mg by mouth once daily.   Discontinued Medications    ASPIRIN (ECOTRIN) 81 MG EC TABLET    Take 81 mg by mouth once daily.         Assessment:   65 y.o. male with multiple co-morbid illnesses here to establish care with new PCP and continue chronic care management.     Plan:     Problem List Items Addressed This Visit        Neuro    Multiple sclerosis     Followed by neurology  - diagnosed since 1991, dependent on electric wheelchair since 2007.   - Right sided rigidity and spasticity  - needs moderate-max assist with all ADLs  except feeding  - significant strength decline since PT stopped 6-8 weeks ago,  Declines outpatient PT due to difficulty leaving home   - Will refer to  to assist with applying for long term home care         Relevant Orders    Ambulatory referral/consult to Social Work    Ambulatory referral/consult to [facility]    Ambulatory referral/consult to Home Health    Paraparesis     To right side secondary to MS  - significant strength decline has PT stopped  - encourage home exercises  - home PT             Cardiac/Vascular    Hypertension - Primary     - bp well controlled on carvedilol and losartan  - stable, cont as now          Abdominal aortic atherosclerosis     Followed by cardiology  - Noted on abd CT 01/2019  - on statin  - stable, cont as now          Coronary artery disease of native artery of native heart with stable angina pectoris     Followed by cardiology  - has nitro, denies CP  - stent to LAD (06/2019)  - on statin  - will request records.             Renal/    Neurogenic bladder     With incontinence  - wears pull-ups during the day and diapers at night  - denies dysuria  - cont to monitor             Immunology/Multi System    Immunosuppression     - on Ocrevus for MS   - last dose September, next dose March. Has had 3 doses and have not seen any improvement. Pt has decided not to go get next dose due to cost.   - cbc stable  - declines flu shot.               Orthopedic    Suspected pressure injury of deep tissue     To left buttock  -  wound care once weekly  - cont as now                Health Maintenance       Date Due Completion Date    Influenza Vaccine (1) 08/01/2020 3/9/2020    Pneumococcal Vaccine (65+ Low/Medium Risk) (2 of 2 - PPSV23) 08/29/2020 6/12/2019    Lipid Panel 09/09/2025 9/9/2020    Colorectal Cancer Screening 01/31/2029 1/31/2019    TETANUS VACCINE 06/12/2029 6/12/2019          Follow up in about 3 months (around 3/28/2021). Total  face-to-face time was 90 min, greater than 50% of this was spent on counseling and coordination of care.       ANGEL LUIS Corona-C  Family Medicine  [facility]

## 2020-12-29 ENCOUNTER — TELEPHONE (OUTPATIENT)
Dept: PRIMARY CARE CLINIC | Facility: CLINIC | Age: 65
End: 2020-12-29

## 2020-12-29 VITALS — OXYGEN SATURATION: 96 % | SYSTOLIC BLOOD PRESSURE: 130 MMHG | DIASTOLIC BLOOD PRESSURE: 80 MMHG | HEART RATE: 74 BPM

## 2020-12-29 PROBLEM — R23.9 SUSPECTED PRESSURE INJURY OF DEEP TISSUE: Status: ACTIVE | Noted: 2020-12-29

## 2020-12-29 PROBLEM — I25.118 CORONARY ARTERY DISEASE OF NATIVE ARTERY OF NATIVE HEART WITH STABLE ANGINA PECTORIS: Status: ACTIVE | Noted: 2020-12-29

## 2020-12-29 PROBLEM — I70.0 ABDOMINAL AORTIC ATHEROSCLEROSIS: Status: ACTIVE | Noted: 2020-12-29

## 2020-12-29 NOTE — TELEPHONE ENCOUNTER
----- Message from Rebecca Bejarano NP sent at 12/29/2020 10:05 AM CST -----  Please request records from gaurav Morales. Last 3 clinic notes and imaging

## 2020-12-29 NOTE — ASSESSMENT & PLAN NOTE
To right side secondary to MS  - significant strength decline has PT stopped  - encourage home exercises  - home PT

## 2020-12-29 NOTE — ASSESSMENT & PLAN NOTE
Followed by cardiology  - has nitro, denies CP  - stent to LAD (06/2019)  - on statin  - will request records.

## 2020-12-29 NOTE — ASSESSMENT & PLAN NOTE
- on Ocrevus for MS   - last dose September, next dose March. Has had 3 doses and have not seen any improvement. Pt has decided not to go get next dose due to cost.   - cbc stable  - declines flu shot.

## 2020-12-29 NOTE — ASSESSMENT & PLAN NOTE
Followed by neurology  - diagnosed since 1991, dependent on electric wheelchair since 2007.   - Right sided rigidity and spasticity  - needs moderate-max assist with all ADLs except feeding  - significant strength decline since PT stopped 6-8 weeks ago,  Declines outpatient PT due to difficulty leaving home   - Will refer to SW to assist with applying for long term home care

## 2020-12-29 NOTE — ASSESSMENT & PLAN NOTE
With incontinence  - wears pull-ups during the day and diapers at night  - denies dysuria  - cont to monitor

## 2021-01-08 DIAGNOSIS — I10 ESSENTIAL HYPERTENSION: Primary | ICD-10-CM

## 2021-01-08 RX ORDER — LOSARTAN POTASSIUM 100 MG/1
100 TABLET ORAL DAILY
Qty: 90 TABLET | Refills: 3 | Status: SHIPPED | OUTPATIENT
Start: 2021-01-08

## 2021-01-11 ENCOUNTER — OFFICE VISIT (OUTPATIENT)
Dept: NEUROLOGY | Facility: CLINIC | Age: 66
End: 2021-01-11
Payer: MEDICARE

## 2021-01-11 VITALS
HEIGHT: 68 IN | BODY MASS INDEX: 27.28 KG/M2 | HEART RATE: 64 BPM | TEMPERATURE: 98 F | DIASTOLIC BLOOD PRESSURE: 87 MMHG | SYSTOLIC BLOOD PRESSURE: 138 MMHG | WEIGHT: 180 LBS

## 2021-01-11 DIAGNOSIS — Z74.09 IMPAIRED MOBILITY AND ADLS: ICD-10-CM

## 2021-01-11 DIAGNOSIS — G82.20 PARAPARESIS: ICD-10-CM

## 2021-01-11 DIAGNOSIS — G81.90 HEMIPARESIS, UNSPECIFIED HEMIPARESIS ETIOLOGY, UNSPECIFIED LATERALITY: ICD-10-CM

## 2021-01-11 DIAGNOSIS — D84.9 IMMUNOSUPPRESSION: ICD-10-CM

## 2021-01-11 DIAGNOSIS — Z71.89 COUNSELING REGARDING GOALS OF CARE: ICD-10-CM

## 2021-01-11 DIAGNOSIS — Z74.09 IMMOBILITY: ICD-10-CM

## 2021-01-11 DIAGNOSIS — G35 MS (MULTIPLE SCLEROSIS): Primary | ICD-10-CM

## 2021-01-11 DIAGNOSIS — G82.20 PARAPLEGIA: ICD-10-CM

## 2021-01-11 DIAGNOSIS — N31.9 NEUROGENIC BLADDER: ICD-10-CM

## 2021-01-11 DIAGNOSIS — Z78.9 IMPAIRED MOBILITY AND ADLS: ICD-10-CM

## 2021-01-11 DIAGNOSIS — R25.2 SPASTICITY: ICD-10-CM

## 2021-01-11 PROCEDURE — 3079F DIAST BP 80-89 MM HG: CPT | Mod: CPTII,S$GLB,, | Performed by: PSYCHIATRY & NEUROLOGY

## 2021-01-11 PROCEDURE — 3288F FALL RISK ASSESSMENT DOCD: CPT | Mod: CPTII,S$GLB,, | Performed by: PSYCHIATRY & NEUROLOGY

## 2021-01-11 PROCEDURE — 99999 PR PBB SHADOW E&M-EST. PATIENT-LVL III: ICD-10-PCS | Mod: PBBFAC,,, | Performed by: PSYCHIATRY & NEUROLOGY

## 2021-01-11 PROCEDURE — 99999 PR PBB SHADOW E&M-EST. PATIENT-LVL III: CPT | Mod: PBBFAC,,, | Performed by: PSYCHIATRY & NEUROLOGY

## 2021-01-11 PROCEDURE — 3075F PR MOST RECENT SYSTOLIC BLOOD PRESS GE 130-139MM HG: ICD-10-PCS | Mod: CPTII,S$GLB,, | Performed by: PSYCHIATRY & NEUROLOGY

## 2021-01-11 PROCEDURE — 3075F SYST BP GE 130 - 139MM HG: CPT | Mod: CPTII,S$GLB,, | Performed by: PSYCHIATRY & NEUROLOGY

## 2021-01-11 PROCEDURE — 99215 OFFICE O/P EST HI 40 MIN: CPT | Mod: S$GLB,,, | Performed by: PSYCHIATRY & NEUROLOGY

## 2021-01-11 PROCEDURE — 3008F PR BODY MASS INDEX (BMI) DOCUMENTED: ICD-10-PCS | Mod: CPTII,S$GLB,, | Performed by: PSYCHIATRY & NEUROLOGY

## 2021-01-11 PROCEDURE — 1126F AMNT PAIN NOTED NONE PRSNT: CPT | Mod: S$GLB,,, | Performed by: PSYCHIATRY & NEUROLOGY

## 2021-01-11 PROCEDURE — 3288F PR FALLS RISK ASSESSMENT DOCUMENTED: ICD-10-PCS | Mod: CPTII,S$GLB,, | Performed by: PSYCHIATRY & NEUROLOGY

## 2021-01-11 PROCEDURE — 1101F PT FALLS ASSESS-DOCD LE1/YR: CPT | Mod: CPTII,S$GLB,, | Performed by: PSYCHIATRY & NEUROLOGY

## 2021-01-11 PROCEDURE — 3079F PR MOST RECENT DIASTOLIC BLOOD PRESSURE 80-89 MM HG: ICD-10-PCS | Mod: CPTII,S$GLB,, | Performed by: PSYCHIATRY & NEUROLOGY

## 2021-01-11 PROCEDURE — 1101F PR PT FALLS ASSESS DOC 0-1 FALLS W/OUT INJ PAST YR: ICD-10-PCS | Mod: CPTII,S$GLB,, | Performed by: PSYCHIATRY & NEUROLOGY

## 2021-01-11 PROCEDURE — 99215 PR OFFICE/OUTPT VISIT, EST, LEVL V, 40-54 MIN: ICD-10-PCS | Mod: S$GLB,,, | Performed by: PSYCHIATRY & NEUROLOGY

## 2021-01-11 PROCEDURE — G0179 PR HOME HEALTH MD RECERTIFICATION: ICD-10-PCS | Mod: ,,, | Performed by: INTERNAL MEDICINE

## 2021-01-11 PROCEDURE — 3008F BODY MASS INDEX DOCD: CPT | Mod: CPTII,S$GLB,, | Performed by: PSYCHIATRY & NEUROLOGY

## 2021-01-11 PROCEDURE — 1126F PR PAIN SEVERITY QUANTIFIED, NO PAIN PRESENT: ICD-10-PCS | Mod: S$GLB,,, | Performed by: PSYCHIATRY & NEUROLOGY

## 2021-01-11 PROCEDURE — G0179 MD RECERTIFICATION HHA PT: HCPCS | Mod: ,,, | Performed by: INTERNAL MEDICINE

## 2021-01-12 ENCOUNTER — EXTERNAL HOME HEALTH (OUTPATIENT)
Dept: HOME HEALTH SERVICES | Facility: HOSPITAL | Age: 66
End: 2021-01-12
Payer: MEDICARE

## 2021-01-15 ENCOUNTER — DOCUMENTATION ONLY (OUTPATIENT)
Dept: NEUROLOGY | Facility: CLINIC | Age: 66
End: 2021-01-15

## 2021-01-20 ENCOUNTER — CARE AT HOME (OUTPATIENT)
Dept: HOME HEALTH SERVICES | Facility: CLINIC | Age: 66
End: 2021-01-20
Payer: MEDICARE

## 2021-01-20 VITALS
WEIGHT: 180 LBS | OXYGEN SATURATION: 95 % | RESPIRATION RATE: 16 BRPM | HEART RATE: 82 BPM | SYSTOLIC BLOOD PRESSURE: 130 MMHG | TEMPERATURE: 99 F | BODY MASS INDEX: 27.37 KG/M2 | DIASTOLIC BLOOD PRESSURE: 76 MMHG

## 2021-01-20 DIAGNOSIS — K59.04 CHRONIC IDIOPATHIC CONSTIPATION: ICD-10-CM

## 2021-01-20 DIAGNOSIS — G35 MULTIPLE SCLEROSIS: ICD-10-CM

## 2021-01-20 DIAGNOSIS — I87.2 VENOUS INSUFFICIENCY (CHRONIC) (PERIPHERAL): ICD-10-CM

## 2021-01-20 DIAGNOSIS — I10 ESSENTIAL HYPERTENSION: ICD-10-CM

## 2021-01-20 DIAGNOSIS — G82.20 PARAPARESIS: ICD-10-CM

## 2021-01-20 DIAGNOSIS — Z71.89 ADVANCED CARE PLANNING/COUNSELING DISCUSSION: ICD-10-CM

## 2021-01-20 DIAGNOSIS — Z51.5 PALLIATIVE CARE ENCOUNTER: ICD-10-CM

## 2021-01-20 DIAGNOSIS — Z74.09 IMMOBILITY: ICD-10-CM

## 2021-01-20 DIAGNOSIS — N31.9 NEUROGENIC BLADDER: ICD-10-CM

## 2021-01-20 DIAGNOSIS — I25.118 CORONARY ARTERY DISEASE OF NATIVE ARTERY OF NATIVE HEART WITH STABLE ANGINA PECTORIS: ICD-10-CM

## 2021-01-20 DIAGNOSIS — R60.0 BILATERAL LEG EDEMA: Primary | ICD-10-CM

## 2021-01-20 DIAGNOSIS — R23.9 SUSPECTED PRESSURE INJURY OF DEEP TISSUE: ICD-10-CM

## 2021-01-20 PROCEDURE — 99350 HOME/RES VST EST HIGH MDM 60: CPT | Mod: 25,S$GLB,, | Performed by: NURSE PRACTITIONER

## 2021-01-20 PROCEDURE — 99350 PR HOME VISIT,ESTAB PATIENT,LEVEL IV: ICD-10-PCS | Mod: 25,S$GLB,, | Performed by: NURSE PRACTITIONER

## 2021-01-20 PROCEDURE — 3078F PR MOST RECENT DIASTOLIC BLOOD PRESSURE < 80 MM HG: ICD-10-PCS | Mod: CPTII,S$GLB,, | Performed by: NURSE PRACTITIONER

## 2021-01-20 PROCEDURE — 3075F PR MOST RECENT SYSTOLIC BLOOD PRESS GE 130-139MM HG: ICD-10-PCS | Mod: CPTII,S$GLB,, | Performed by: NURSE PRACTITIONER

## 2021-01-20 PROCEDURE — 3078F DIAST BP <80 MM HG: CPT | Mod: CPTII,S$GLB,, | Performed by: NURSE PRACTITIONER

## 2021-01-20 PROCEDURE — 99497 ADVNCD CARE PLAN 30 MIN: CPT | Mod: S$GLB,,, | Performed by: NURSE PRACTITIONER

## 2021-01-20 PROCEDURE — 3075F SYST BP GE 130 - 139MM HG: CPT | Mod: CPTII,S$GLB,, | Performed by: NURSE PRACTITIONER

## 2021-01-20 PROCEDURE — 99497 PR ADVNCD CARE PLAN 30 MIN: ICD-10-PCS | Mod: S$GLB,,, | Performed by: NURSE PRACTITIONER

## 2021-01-20 RX ORDER — PANTOPRAZOLE SODIUM 40 MG/1
40 TABLET, DELAYED RELEASE ORAL DAILY
Qty: 90 TABLET | Refills: 3 | Status: SHIPPED | OUTPATIENT
Start: 2021-01-20 | End: 2021-12-27 | Stop reason: SDUPTHER

## 2021-01-20 RX ORDER — TICAGRELOR 90 MG/1
90 TABLET ORAL EVERY 12 HOURS
Qty: 180 TABLET | Refills: 3 | Status: SHIPPED | OUTPATIENT
Start: 2021-01-20

## 2021-01-20 RX ORDER — CARVEDILOL 6.25 MG/1
6.25 TABLET ORAL 2 TIMES DAILY
Qty: 180 TABLET | Refills: 3 | Status: SHIPPED | OUTPATIENT
Start: 2021-01-20

## 2021-01-22 ENCOUNTER — DOCUMENTATION ONLY (OUTPATIENT)
Dept: NEUROLOGY | Facility: CLINIC | Age: 66
End: 2021-01-22

## 2021-01-22 ENCOUNTER — TELEPHONE (OUTPATIENT)
Dept: PSYCHIATRY | Facility: CLINIC | Age: 66
End: 2021-01-22

## 2021-01-24 RX ORDER — NITROGLYCERIN 0.4 MG/1
0.4 TABLET SUBLINGUAL EVERY 5 MIN PRN
Qty: 25 TABLET | Refills: 5 | Status: SHIPPED | OUTPATIENT
Start: 2021-01-24

## 2021-01-25 ENCOUNTER — TELEPHONE (OUTPATIENT)
Dept: PSYCHIATRY | Facility: CLINIC | Age: 66
End: 2021-01-25

## 2021-01-25 DIAGNOSIS — G35 MULTIPLE SCLEROSIS: Primary | ICD-10-CM

## 2021-01-25 DIAGNOSIS — Z74.09 IMMOBILITY: ICD-10-CM

## 2021-02-03 ENCOUNTER — TELEPHONE (OUTPATIENT)
Dept: HOME HEALTH SERVICES | Facility: CLINIC | Age: 66
End: 2021-02-03

## 2021-02-03 DIAGNOSIS — G35 MULTIPLE SCLEROSIS: Primary | ICD-10-CM

## 2021-02-03 DIAGNOSIS — G82.20 PARAPARESIS: ICD-10-CM

## 2021-02-03 DIAGNOSIS — Z74.09 IMMOBILITY: ICD-10-CM

## 2021-02-05 ENCOUNTER — PATIENT MESSAGE (OUTPATIENT)
Dept: NEUROLOGY | Facility: CLINIC | Age: 66
End: 2021-02-05

## 2021-02-12 ENCOUNTER — DOCUMENT SCAN (OUTPATIENT)
Dept: HOME HEALTH SERVICES | Facility: HOSPITAL | Age: 66
End: 2021-02-12
Payer: MEDICARE

## 2021-02-26 ENCOUNTER — DOCUMENT SCAN (OUTPATIENT)
Dept: HOME HEALTH SERVICES | Facility: HOSPITAL | Age: 66
End: 2021-02-26
Payer: MEDICARE

## 2021-03-01 ENCOUNTER — TELEPHONE (OUTPATIENT)
Dept: PRIMARY CARE CLINIC | Facility: CLINIC | Age: 66
End: 2021-03-01

## 2021-03-03 ENCOUNTER — CARE AT HOME (OUTPATIENT)
Dept: HOME HEALTH SERVICES | Facility: CLINIC | Age: 66
End: 2021-03-03
Payer: MEDICARE

## 2021-03-03 DIAGNOSIS — I10 ESSENTIAL HYPERTENSION: ICD-10-CM

## 2021-03-03 DIAGNOSIS — I25.118 CORONARY ARTERY DISEASE OF NATIVE ARTERY OF NATIVE HEART WITH STABLE ANGINA PECTORIS: ICD-10-CM

## 2021-03-03 DIAGNOSIS — K21.00 GASTROESOPHAGEAL REFLUX DISEASE WITH ESOPHAGITIS WITHOUT HEMORRHAGE: ICD-10-CM

## 2021-03-03 DIAGNOSIS — G35 MULTIPLE SCLEROSIS: Primary | ICD-10-CM

## 2021-03-03 DIAGNOSIS — K59.04 CHRONIC IDIOPATHIC CONSTIPATION: ICD-10-CM

## 2021-03-03 DIAGNOSIS — I87.2 VENOUS INSUFFICIENCY (CHRONIC) (PERIPHERAL): ICD-10-CM

## 2021-03-03 DIAGNOSIS — Z74.09 IMMOBILITY: ICD-10-CM

## 2021-03-03 DIAGNOSIS — G82.20 PARAPARESIS: ICD-10-CM

## 2021-03-03 DIAGNOSIS — N31.9 NEUROGENIC BLADDER: ICD-10-CM

## 2021-03-03 DIAGNOSIS — I70.0 ABDOMINAL AORTIC ATHEROSCLEROSIS: ICD-10-CM

## 2021-03-03 DIAGNOSIS — Z51.5 PALLIATIVE CARE ENCOUNTER: ICD-10-CM

## 2021-03-03 DIAGNOSIS — D84.9 IMMUNOSUPPRESSION: ICD-10-CM

## 2021-03-03 DIAGNOSIS — R23.9 SUSPECTED PRESSURE INJURY OF DEEP TISSUE: ICD-10-CM

## 2021-03-03 PROCEDURE — 99350 PR HOME VISIT,ESTAB PATIENT,LEVEL IV: ICD-10-PCS | Mod: S$GLB,,, | Performed by: NURSE PRACTITIONER

## 2021-03-03 PROCEDURE — 3074F SYST BP LT 130 MM HG: CPT | Mod: CPTII,S$GLB,, | Performed by: NURSE PRACTITIONER

## 2021-03-03 PROCEDURE — 3078F PR MOST RECENT DIASTOLIC BLOOD PRESSURE < 80 MM HG: ICD-10-PCS | Mod: CPTII,S$GLB,, | Performed by: NURSE PRACTITIONER

## 2021-03-03 PROCEDURE — 99350 HOME/RES VST EST HIGH MDM 60: CPT | Mod: S$GLB,,, | Performed by: NURSE PRACTITIONER

## 2021-03-03 PROCEDURE — 3078F DIAST BP <80 MM HG: CPT | Mod: CPTII,S$GLB,, | Performed by: NURSE PRACTITIONER

## 2021-03-03 PROCEDURE — 3074F PR MOST RECENT SYSTOLIC BLOOD PRESSURE < 130 MM HG: ICD-10-PCS | Mod: CPTII,S$GLB,, | Performed by: NURSE PRACTITIONER

## 2021-03-04 VITALS
RESPIRATION RATE: 16 BRPM | SYSTOLIC BLOOD PRESSURE: 120 MMHG | HEART RATE: 77 BPM | OXYGEN SATURATION: 97 % | DIASTOLIC BLOOD PRESSURE: 66 MMHG | TEMPERATURE: 98 F

## 2021-03-04 PROBLEM — K21.00 GASTROESOPHAGEAL REFLUX DISEASE WITH ESOPHAGITIS WITHOUT HEMORRHAGE: Status: ACTIVE | Noted: 2021-03-04

## 2021-03-22 ENCOUNTER — DOCUMENT SCAN (OUTPATIENT)
Dept: HOME HEALTH SERVICES | Facility: HOSPITAL | Age: 66
End: 2021-03-22
Payer: MEDICARE

## 2021-03-25 ENCOUNTER — EXTERNAL HOME HEALTH (OUTPATIENT)
Dept: HOME HEALTH SERVICES | Facility: HOSPITAL | Age: 66
End: 2021-03-25
Payer: MEDICARE

## 2021-04-01 ENCOUNTER — EXTERNAL VISIT (OUTPATIENT)
Dept: PRIMARY CARE CLINIC | Facility: CLINIC | Age: 66
End: 2021-04-01
Payer: MEDICARE

## 2021-04-01 VITALS — OXYGEN SATURATION: 96 % | SYSTOLIC BLOOD PRESSURE: 130 MMHG | DIASTOLIC BLOOD PRESSURE: 80 MMHG | HEART RATE: 74 BPM

## 2021-04-01 DIAGNOSIS — G35 MULTIPLE SCLEROSIS: ICD-10-CM

## 2021-04-01 DIAGNOSIS — E55.9 VITAMIN D DEFICIENCY: ICD-10-CM

## 2021-04-01 DIAGNOSIS — I11.9 CARDIOMYOPATHY DUE TO HYPERTENSION, WITHOUT HEART FAILURE: ICD-10-CM

## 2021-04-01 DIAGNOSIS — I43 CARDIOMYOPATHY DUE TO HYPERTENSION, WITHOUT HEART FAILURE: ICD-10-CM

## 2021-04-01 DIAGNOSIS — I70.0 ABDOMINAL AORTIC ATHEROSCLEROSIS: ICD-10-CM

## 2021-04-01 DIAGNOSIS — I25.118 CORONARY ARTERY DISEASE OF NATIVE ARTERY OF NATIVE HEART WITH STABLE ANGINA PECTORIS: ICD-10-CM

## 2021-04-01 DIAGNOSIS — K21.00 GASTROESOPHAGEAL REFLUX DISEASE WITH ESOPHAGITIS WITHOUT HEMORRHAGE: Primary | ICD-10-CM

## 2021-04-01 PROCEDURE — 99350 HOME/RES VST EST HIGH MDM 60: CPT | Mod: S$GLB,,, | Performed by: NURSE PRACTITIONER

## 2021-04-01 PROCEDURE — 99350 PR HOME VISIT,ESTAB PATIENT,LEVEL IV: ICD-10-PCS | Mod: S$GLB,,, | Performed by: NURSE PRACTITIONER

## 2021-04-12 ENCOUNTER — TELEPHONE (OUTPATIENT)
Dept: NEUROLOGY | Facility: CLINIC | Age: 66
End: 2021-04-12

## 2021-04-12 NOTE — TELEPHONE ENCOUNTER
----- Message from Nasrin Sanches sent at 4/12/2021  3:55 PM CDT -----  Regarding: pt advice  Contact: pt @ [phone]  Caller asking to speak with someone in Dr. Deluca's office regarding patient's scheduled virtual visit that was to take place this morning, says they have not heard anything yet. Please call.

## 2021-04-13 ENCOUNTER — OFFICE VISIT (OUTPATIENT)
Dept: NEUROLOGY | Facility: CLINIC | Age: 66
End: 2021-04-13
Payer: MEDICARE

## 2021-04-13 ENCOUNTER — LAB VISIT (OUTPATIENT)
Dept: LAB | Facility: HOSPITAL | Age: 66
End: 2021-04-13
Attending: INTERNAL MEDICINE
Payer: MEDICARE

## 2021-04-13 ENCOUNTER — TELEPHONE (OUTPATIENT)
Dept: PRIMARY CARE CLINIC | Facility: CLINIC | Age: 66
End: 2021-04-13

## 2021-04-13 VITALS
SYSTOLIC BLOOD PRESSURE: 140 MMHG | HEART RATE: 69 BPM | BODY MASS INDEX: 27.37 KG/M2 | TEMPERATURE: 98 F | HEIGHT: 68 IN | DIASTOLIC BLOOD PRESSURE: 89 MMHG

## 2021-04-13 DIAGNOSIS — I25.118 CORONARY ARTERY DISEASE OF NATIVE ARTERY OF NATIVE HEART WITH STABLE ANGINA PECTORIS: ICD-10-CM

## 2021-04-13 DIAGNOSIS — D84.9 IMMUNOSUPPRESSION: Primary | ICD-10-CM

## 2021-04-13 DIAGNOSIS — G35 MULTIPLE SCLEROSIS: Primary | ICD-10-CM

## 2021-04-13 DIAGNOSIS — E55.9 VITAMIN D DEFICIENCY: ICD-10-CM

## 2021-04-13 DIAGNOSIS — Z99.3 WHEELCHAIR BOUND: ICD-10-CM

## 2021-04-13 DIAGNOSIS — Z78.9 IMPAIRED MOBILITY AND ADLS: ICD-10-CM

## 2021-04-13 DIAGNOSIS — Z74.09 IMPAIRED MOBILITY AND ADLS: ICD-10-CM

## 2021-04-13 DIAGNOSIS — Z71.89 COUNSELING REGARDING GOALS OF CARE: ICD-10-CM

## 2021-04-13 LAB
25(OH)D3+25(OH)D2 SERPL-MCNC: 25 NG/ML (ref 30–96)
ALBUMIN SERPL BCP-MCNC: 3.9 G/DL (ref 3.5–5.2)
ALP SERPL-CCNC: 92 U/L (ref 55–135)
ALT SERPL W/O P-5'-P-CCNC: 53 U/L (ref 10–44)
ANION GAP SERPL CALC-SCNC: 8 MMOL/L (ref 8–16)
AST SERPL-CCNC: 30 U/L (ref 10–40)
BASOPHILS # BLD AUTO: 0.03 K/UL (ref 0–0.2)
BASOPHILS NFR BLD: 0.4 % (ref 0–1.9)
BILIRUB SERPL-MCNC: 0.6 MG/DL (ref 0.1–1)
BUN SERPL-MCNC: 16 MG/DL (ref 8–23)
CALCIUM SERPL-MCNC: 9.4 MG/DL (ref 8.7–10.5)
CHLORIDE SERPL-SCNC: 102 MMOL/L (ref 95–110)
CHOLEST SERPL-MCNC: 121 MG/DL (ref 120–199)
CHOLEST/HDLC SERPL: 2.5 {RATIO} (ref 2–5)
CO2 SERPL-SCNC: 25 MMOL/L (ref 23–29)
CREAT SERPL-MCNC: 0.7 MG/DL (ref 0.5–1.4)
DIFFERENTIAL METHOD: ABNORMAL
EOSINOPHIL # BLD AUTO: 0.2 K/UL (ref 0–0.5)
EOSINOPHIL NFR BLD: 2.6 % (ref 0–8)
ERYTHROCYTE [DISTWIDTH] IN BLOOD BY AUTOMATED COUNT: 13.6 % (ref 11.5–14.5)
EST. GFR  (AFRICAN AMERICAN): >60 ML/MIN/1.73 M^2
EST. GFR  (NON AFRICAN AMERICAN): >60 ML/MIN/1.73 M^2
GLUCOSE SERPL-MCNC: 85 MG/DL (ref 70–110)
HCT VFR BLD AUTO: 45.1 % (ref 40–54)
HDLC SERPL-MCNC: 49 MG/DL (ref 40–75)
HDLC SERPL: 40.5 % (ref 20–50)
HGB BLD-MCNC: 15 G/DL (ref 14–18)
IMM GRANULOCYTES # BLD AUTO: 0.06 K/UL (ref 0–0.04)
IMM GRANULOCYTES NFR BLD AUTO: 0.7 % (ref 0–0.5)
LDLC SERPL CALC-MCNC: 54.4 MG/DL (ref 63–159)
LYMPHOCYTES # BLD AUTO: 0.8 K/UL (ref 1–4.8)
LYMPHOCYTES NFR BLD: 9.1 % (ref 18–48)
MCH RBC QN AUTO: 30.4 PG (ref 27–31)
MCHC RBC AUTO-ENTMCNC: 33.3 G/DL (ref 32–36)
MCV RBC AUTO: 91 FL (ref 82–98)
MONOCYTES # BLD AUTO: 0.8 K/UL (ref 0.3–1)
MONOCYTES NFR BLD: 9.7 % (ref 4–15)
NEUTROPHILS # BLD AUTO: 6.6 K/UL (ref 1.8–7.7)
NEUTROPHILS NFR BLD: 77.5 % (ref 38–73)
NONHDLC SERPL-MCNC: 72 MG/DL
NRBC BLD-RTO: 0 /100 WBC
PLATELET # BLD AUTO: 242 K/UL (ref 150–450)
PMV BLD AUTO: 10.6 FL (ref 9.2–12.9)
POTASSIUM SERPL-SCNC: 4.2 MMOL/L (ref 3.5–5.1)
PROT SERPL-MCNC: 6.7 G/DL (ref 6–8.4)
RBC # BLD AUTO: 4.94 M/UL (ref 4.6–6.2)
SODIUM SERPL-SCNC: 135 MMOL/L (ref 136–145)
TRIGL SERPL-MCNC: 88 MG/DL (ref 30–150)
WBC # BLD AUTO: 8.45 K/UL (ref 3.9–12.7)

## 2021-04-13 PROCEDURE — 99214 OFFICE O/P EST MOD 30 MIN: CPT | Mod: S$GLB,,, | Performed by: CLINICAL NURSE SPECIALIST

## 2021-04-13 PROCEDURE — 1101F PR PT FALLS ASSESS DOC 0-1 FALLS W/OUT INJ PAST YR: ICD-10-PCS | Mod: CPTII,S$GLB,, | Performed by: CLINICAL NURSE SPECIALIST

## 2021-04-13 PROCEDURE — 3008F PR BODY MASS INDEX (BMI) DOCUMENTED: ICD-10-PCS | Mod: CPTII,S$GLB,, | Performed by: CLINICAL NURSE SPECIALIST

## 2021-04-13 PROCEDURE — 1101F PT FALLS ASSESS-DOCD LE1/YR: CPT | Mod: CPTII,S$GLB,, | Performed by: CLINICAL NURSE SPECIALIST

## 2021-04-13 PROCEDURE — 99999 PR PBB SHADOW E&M-EST. PATIENT-LVL IV: ICD-10-PCS | Mod: PBBFAC,,, | Performed by: CLINICAL NURSE SPECIALIST

## 2021-04-13 PROCEDURE — 99999 PR PBB SHADOW E&M-EST. PATIENT-LVL IV: CPT | Mod: PBBFAC,,, | Performed by: CLINICAL NURSE SPECIALIST

## 2021-04-13 PROCEDURE — 80061 LIPID PANEL: CPT | Performed by: NURSE PRACTITIONER

## 2021-04-13 PROCEDURE — 3288F PR FALLS RISK ASSESSMENT DOCUMENTED: ICD-10-PCS | Mod: CPTII,S$GLB,, | Performed by: CLINICAL NURSE SPECIALIST

## 2021-04-13 PROCEDURE — 80053 COMPREHEN METABOLIC PANEL: CPT | Performed by: NURSE PRACTITIONER

## 2021-04-13 PROCEDURE — 3008F BODY MASS INDEX DOCD: CPT | Mod: CPTII,S$GLB,, | Performed by: CLINICAL NURSE SPECIALIST

## 2021-04-13 PROCEDURE — 99214 PR OFFICE/OUTPT VISIT, EST, LEVL IV, 30-39 MIN: ICD-10-PCS | Mod: S$GLB,,, | Performed by: CLINICAL NURSE SPECIALIST

## 2021-04-13 PROCEDURE — 1125F AMNT PAIN NOTED PAIN PRSNT: CPT | Mod: S$GLB,,, | Performed by: CLINICAL NURSE SPECIALIST

## 2021-04-13 PROCEDURE — 82306 VITAMIN D 25 HYDROXY: CPT | Performed by: NURSE PRACTITIONER

## 2021-04-13 PROCEDURE — 85025 COMPLETE CBC W/AUTO DIFF WBC: CPT | Performed by: NURSE PRACTITIONER

## 2021-04-13 PROCEDURE — 3288F FALL RISK ASSESSMENT DOCD: CPT | Mod: CPTII,S$GLB,, | Performed by: CLINICAL NURSE SPECIALIST

## 2021-04-13 PROCEDURE — 1125F PR PAIN SEVERITY QUANTIFIED, PAIN PRESENT: ICD-10-PCS | Mod: S$GLB,,, | Performed by: CLINICAL NURSE SPECIALIST

## 2021-04-13 PROCEDURE — 36415 COLL VENOUS BLD VENIPUNCTURE: CPT | Performed by: NURSE PRACTITIONER

## 2021-04-13 RX ORDER — MULTIVIT WITH MINERALS/HERBS
1 TABLET ORAL DAILY
COMMUNITY

## 2021-04-14 ENCOUNTER — TELEPHONE (OUTPATIENT)
Dept: NEUROLOGY | Facility: CLINIC | Age: 66
End: 2021-04-14

## 2021-04-15 ENCOUNTER — DOCUMENT SCAN (OUTPATIENT)
Dept: HOME HEALTH SERVICES | Facility: HOSPITAL | Age: 66
End: 2021-04-15
Payer: MEDICARE

## 2021-04-21 ENCOUNTER — TELEPHONE (OUTPATIENT)
Dept: NEUROLOGY | Facility: CLINIC | Age: 66
End: 2021-04-21

## 2021-04-26 ENCOUNTER — TELEPHONE (OUTPATIENT)
Dept: PRIMARY CARE CLINIC | Facility: CLINIC | Age: 66
End: 2021-04-26

## 2021-04-26 DIAGNOSIS — R35.0 URINARY FREQUENCY: Primary | ICD-10-CM

## 2021-04-26 DIAGNOSIS — M50.30 DDD (DEGENERATIVE DISC DISEASE), CERVICAL: ICD-10-CM

## 2021-04-26 RX ORDER — GABAPENTIN 100 MG/1
100 CAPSULE ORAL 3 TIMES DAILY
Qty: 90 CAPSULE | Refills: 11 | Status: SHIPPED | OUTPATIENT
Start: 2021-04-26 | End: 2022-04-26

## 2021-04-27 ENCOUNTER — LAB VISIT (OUTPATIENT)
Dept: LAB | Facility: HOSPITAL | Age: 66
End: 2021-04-27
Attending: NURSE PRACTITIONER
Payer: MEDICARE

## 2021-04-27 DIAGNOSIS — R35.0 URINARY FREQUENCY: ICD-10-CM

## 2021-04-27 LAB
BILIRUB UR QL STRIP: NEGATIVE
CLARITY UR: CLEAR
COLOR UR: YELLOW
GLUCOSE UR QL STRIP: NEGATIVE
HGB UR QL STRIP: NEGATIVE
KETONES UR QL STRIP: NEGATIVE
LEUKOCYTE ESTERASE UR QL STRIP: NEGATIVE
NITRITE UR QL STRIP: NEGATIVE
PH UR STRIP: 6 [PH] (ref 5–8)
PROT UR QL STRIP: NEGATIVE
SP GR UR STRIP: 1.02 (ref 1–1.03)
URN SPEC COLLECT METH UR: NORMAL
UROBILINOGEN UR STRIP-ACNC: NEGATIVE EU/DL

## 2021-04-27 PROCEDURE — 87086 URINE CULTURE/COLONY COUNT: CPT | Performed by: NURSE PRACTITIONER

## 2021-04-27 PROCEDURE — 81003 URINALYSIS AUTO W/O SCOPE: CPT | Performed by: NURSE PRACTITIONER

## 2021-04-29 ENCOUNTER — PATIENT MESSAGE (OUTPATIENT)
Dept: RESEARCH | Facility: HOSPITAL | Age: 66
End: 2021-04-29

## 2021-04-29 LAB — BACTERIA UR CULT: NO GROWTH

## 2021-04-30 ENCOUNTER — HOSPITAL ENCOUNTER (OUTPATIENT)
Facility: HOSPITAL | Age: 66
Discharge: HOME HEALTH | End: 2021-05-06
Attending: EMERGENCY MEDICINE | Admitting: INTERNAL MEDICINE
Payer: MEDICARE

## 2021-04-30 DIAGNOSIS — R07.9 CHEST PAIN: ICD-10-CM

## 2021-04-30 DIAGNOSIS — K52.9 COLITIS: Primary | ICD-10-CM

## 2021-04-30 DIAGNOSIS — K56.41 FECAL IMPACTION: ICD-10-CM

## 2021-04-30 LAB
ALBUMIN SERPL BCP-MCNC: 4.5 G/DL (ref 3.5–5.2)
ALP SERPL-CCNC: 87 U/L (ref 55–135)
ALT SERPL W/O P-5'-P-CCNC: 48 U/L (ref 10–44)
ANION GAP SERPL CALC-SCNC: 11 MMOL/L (ref 8–16)
AST SERPL-CCNC: 30 U/L (ref 10–40)
BASOPHILS # BLD AUTO: 0.02 K/UL (ref 0–0.2)
BASOPHILS NFR BLD: 0.2 % (ref 0–1.9)
BILIRUB SERPL-MCNC: 1.2 MG/DL (ref 0.1–1)
BNP SERPL-MCNC: 26 PG/ML (ref 0–99)
BUN SERPL-MCNC: 17 MG/DL (ref 8–23)
CALCIUM SERPL-MCNC: 10 MG/DL (ref 8.7–10.5)
CHLORIDE SERPL-SCNC: 101 MMOL/L (ref 95–110)
CO2 SERPL-SCNC: 26 MMOL/L (ref 23–29)
CREAT SERPL-MCNC: 0.8 MG/DL (ref 0.5–1.4)
DIFFERENTIAL METHOD: ABNORMAL
EOSINOPHIL # BLD AUTO: 0.3 K/UL (ref 0–0.5)
EOSINOPHIL NFR BLD: 2.5 % (ref 0–8)
ERYTHROCYTE [DISTWIDTH] IN BLOOD BY AUTOMATED COUNT: 13.7 % (ref 11.5–14.5)
EST. GFR  (AFRICAN AMERICAN): >60 ML/MIN/1.73 M^2
EST. GFR  (NON AFRICAN AMERICAN): >60 ML/MIN/1.73 M^2
GLUCOSE SERPL-MCNC: 89 MG/DL (ref 70–110)
HCT VFR BLD AUTO: 47.3 % (ref 40–54)
HGB BLD-MCNC: 15.5 G/DL (ref 14–18)
IMM GRANULOCYTES # BLD AUTO: 0.07 K/UL (ref 0–0.04)
IMM GRANULOCYTES NFR BLD AUTO: 0.6 % (ref 0–0.5)
INFLUENZA A, MOLECULAR: NEGATIVE
INFLUENZA B, MOLECULAR: NEGATIVE
LACTATE SERPL-SCNC: 1.2 MMOL/L (ref 0.5–1.9)
LIPASE SERPL-CCNC: 24 U/L (ref 4–60)
LYMPHOCYTES # BLD AUTO: 0.9 K/UL (ref 1–4.8)
LYMPHOCYTES NFR BLD: 7.6 % (ref 18–48)
MAGNESIUM SERPL-MCNC: 1.9 MG/DL (ref 1.6–2.6)
MCH RBC QN AUTO: 30 PG (ref 27–31)
MCHC RBC AUTO-ENTMCNC: 32.8 G/DL (ref 32–36)
MCV RBC AUTO: 92 FL (ref 82–98)
MONOCYTES # BLD AUTO: 1 K/UL (ref 0.3–1)
MONOCYTES NFR BLD: 8.8 % (ref 4–15)
NEUTROPHILS # BLD AUTO: 9.2 K/UL (ref 1.8–7.7)
NEUTROPHILS NFR BLD: 80.3 % (ref 38–73)
NRBC BLD-RTO: 0 /100 WBC
PLATELET # BLD AUTO: 252 K/UL (ref 150–450)
PMV BLD AUTO: 10.4 FL (ref 9.2–12.9)
POTASSIUM SERPL-SCNC: 4.2 MMOL/L (ref 3.5–5.1)
PROT SERPL-MCNC: 7.8 G/DL (ref 6–8.4)
RBC # BLD AUTO: 5.17 M/UL (ref 4.6–6.2)
SARS-COV-2 RDRP RESP QL NAA+PROBE: NEGATIVE
SODIUM SERPL-SCNC: 138 MMOL/L (ref 136–145)
SPECIMEN SOURCE: NORMAL
TROPONIN I SERPL DL<=0.01 NG/ML-MCNC: <0.03 NG/ML
TSH SERPL DL<=0.005 MIU/L-ACNC: 3.9 UIU/ML (ref 0.34–5.6)
WBC # BLD AUTO: 11.51 K/UL (ref 3.9–12.7)

## 2021-04-30 PROCEDURE — 25500020 PHARM REV CODE 255: Performed by: EMERGENCY MEDICINE

## 2021-04-30 PROCEDURE — 83735 ASSAY OF MAGNESIUM: CPT | Performed by: EMERGENCY MEDICINE

## 2021-04-30 PROCEDURE — 85025 COMPLETE CBC W/AUTO DIFF WBC: CPT | Performed by: EMERGENCY MEDICINE

## 2021-04-30 PROCEDURE — 80053 COMPREHEN METABOLIC PANEL: CPT | Performed by: EMERGENCY MEDICINE

## 2021-04-30 PROCEDURE — G0378 HOSPITAL OBSERVATION PER HR: HCPCS

## 2021-04-30 PROCEDURE — S0030 INJECTION, METRONIDAZOLE: HCPCS | Performed by: EMERGENCY MEDICINE

## 2021-04-30 PROCEDURE — 84484 ASSAY OF TROPONIN QUANT: CPT | Performed by: EMERGENCY MEDICINE

## 2021-04-30 PROCEDURE — 99285 EMERGENCY DEPT VISIT HI MDM: CPT | Mod: 25

## 2021-04-30 PROCEDURE — 87040 BLOOD CULTURE FOR BACTERIA: CPT | Performed by: EMERGENCY MEDICINE

## 2021-04-30 PROCEDURE — 25000003 PHARM REV CODE 250: Performed by: EMERGENCY MEDICINE

## 2021-04-30 PROCEDURE — 87502 INFLUENZA DNA AMP PROBE: CPT | Performed by: EMERGENCY MEDICINE

## 2021-04-30 PROCEDURE — U0002 COVID-19 LAB TEST NON-CDC: HCPCS | Performed by: NURSE PRACTITIONER

## 2021-04-30 PROCEDURE — 83690 ASSAY OF LIPASE: CPT | Performed by: EMERGENCY MEDICINE

## 2021-04-30 PROCEDURE — 96375 TX/PRO/DX INJ NEW DRUG ADDON: CPT | Mod: 59

## 2021-04-30 PROCEDURE — 96361 HYDRATE IV INFUSION ADD-ON: CPT

## 2021-04-30 PROCEDURE — 36415 COLL VENOUS BLD VENIPUNCTURE: CPT | Performed by: EMERGENCY MEDICINE

## 2021-04-30 PROCEDURE — 83880 ASSAY OF NATRIURETIC PEPTIDE: CPT | Performed by: EMERGENCY MEDICINE

## 2021-04-30 PROCEDURE — 83605 ASSAY OF LACTIC ACID: CPT | Performed by: EMERGENCY MEDICINE

## 2021-04-30 PROCEDURE — 96365 THER/PROPH/DIAG IV INF INIT: CPT

## 2021-04-30 PROCEDURE — 84443 ASSAY THYROID STIM HORMONE: CPT | Performed by: EMERGENCY MEDICINE

## 2021-04-30 PROCEDURE — 63600175 PHARM REV CODE 636 W HCPCS: Performed by: NURSE PRACTITIONER

## 2021-04-30 RX ORDER — METRONIDAZOLE 500 MG/100ML
500 INJECTION, SOLUTION INTRAVENOUS
Status: DISCONTINUED | OUTPATIENT
Start: 2021-05-01 | End: 2021-05-03

## 2021-04-30 RX ORDER — METRONIDAZOLE 500 MG/100ML
500 INJECTION, SOLUTION INTRAVENOUS
Status: COMPLETED | OUTPATIENT
Start: 2021-04-30 | End: 2021-04-30

## 2021-04-30 RX ORDER — LEVOFLOXACIN 5 MG/ML
750 INJECTION, SOLUTION INTRAVENOUS
Status: DISCONTINUED | OUTPATIENT
Start: 2021-04-30 | End: 2021-05-03

## 2021-04-30 RX ORDER — LEVOFLOXACIN 5 MG/ML
750 INJECTION, SOLUTION INTRAVENOUS
Status: DISCONTINUED | OUTPATIENT
Start: 2021-04-30 | End: 2021-04-30

## 2021-04-30 RX ADMIN — IOHEXOL 100 ML: 350 INJECTION, SOLUTION INTRAVENOUS at 03:04

## 2021-04-30 RX ADMIN — LEVOFLOXACIN 750 MG: 5 INJECTION, SOLUTION INTRAVENOUS at 08:04

## 2021-04-30 RX ADMIN — METRONIDAZOLE 500 MG: 500 INJECTION, SOLUTION INTRAVENOUS at 05:04

## 2021-04-30 RX ADMIN — SODIUM CHLORIDE 1000 ML: 0.9 INJECTION, SOLUTION INTRAVENOUS at 02:04

## 2021-05-01 PROBLEM — K56.41 FECAL IMPACTION: Status: ACTIVE | Noted: 2021-05-01

## 2021-05-01 LAB
ALBUMIN SERPL BCP-MCNC: 3.7 G/DL (ref 3.5–5.2)
ALP SERPL-CCNC: 73 U/L (ref 55–135)
ALT SERPL W/O P-5'-P-CCNC: 36 U/L (ref 10–44)
ANION GAP SERPL CALC-SCNC: 8 MMOL/L (ref 8–16)
AST SERPL-CCNC: 24 U/L (ref 10–40)
BASOPHILS # BLD AUTO: 0.03 K/UL (ref 0–0.2)
BASOPHILS NFR BLD: 0.3 % (ref 0–1.9)
BILIRUB SERPL-MCNC: 1.2 MG/DL (ref 0.1–1)
BUN SERPL-MCNC: 16 MG/DL (ref 8–23)
C DIFF GDH STL QL: NEGATIVE
C DIFF TOX A+B STL QL IA: NEGATIVE
CALCIUM SERPL-MCNC: 8.7 MG/DL (ref 8.7–10.5)
CHLORIDE SERPL-SCNC: 105 MMOL/L (ref 95–110)
CO2 SERPL-SCNC: 23 MMOL/L (ref 23–29)
CREAT SERPL-MCNC: 0.9 MG/DL (ref 0.5–1.4)
DIFFERENTIAL METHOD: ABNORMAL
EOSINOPHIL # BLD AUTO: 0.2 K/UL (ref 0–0.5)
EOSINOPHIL NFR BLD: 2.1 % (ref 0–8)
ERYTHROCYTE [DISTWIDTH] IN BLOOD BY AUTOMATED COUNT: 13.7 % (ref 11.5–14.5)
EST. GFR  (AFRICAN AMERICAN): >60 ML/MIN/1.73 M^2
EST. GFR  (NON AFRICAN AMERICAN): >60 ML/MIN/1.73 M^2
ESTIMATED AVG GLUCOSE: 108 MG/DL (ref 68–131)
GLUCOSE SERPL-MCNC: 95 MG/DL (ref 70–110)
HBA1C MFR BLD: 5.4 % (ref 4.5–6.2)
HCT VFR BLD AUTO: 41.5 % (ref 40–54)
HGB BLD-MCNC: 13.8 G/DL (ref 14–18)
IMM GRANULOCYTES # BLD AUTO: 0.07 K/UL (ref 0–0.04)
IMM GRANULOCYTES NFR BLD AUTO: 0.7 % (ref 0–0.5)
LYMPHOCYTES # BLD AUTO: 0.8 K/UL (ref 1–4.8)
LYMPHOCYTES NFR BLD: 7.1 % (ref 18–48)
MAGNESIUM SERPL-MCNC: 1.8 MG/DL (ref 1.6–2.6)
MCH RBC QN AUTO: 30.3 PG (ref 27–31)
MCHC RBC AUTO-ENTMCNC: 33.3 G/DL (ref 32–36)
MCV RBC AUTO: 91 FL (ref 82–98)
MONOCYTES # BLD AUTO: 1.2 K/UL (ref 0.3–1)
MONOCYTES NFR BLD: 11.2 % (ref 4–15)
NEUTROPHILS # BLD AUTO: 8.3 K/UL (ref 1.8–7.7)
NEUTROPHILS NFR BLD: 78.6 % (ref 38–73)
NRBC BLD-RTO: 0 /100 WBC
OB PNL STL: NEGATIVE
PLATELET # BLD AUTO: 231 K/UL (ref 150–450)
PMV BLD AUTO: 10.2 FL (ref 9.2–12.9)
POTASSIUM SERPL-SCNC: 3.8 MMOL/L (ref 3.5–5.1)
PROT SERPL-MCNC: 6.5 G/DL (ref 6–8.4)
RBC # BLD AUTO: 4.55 M/UL (ref 4.6–6.2)
SODIUM SERPL-SCNC: 136 MMOL/L (ref 136–145)
WBC # BLD AUTO: 10.57 K/UL (ref 3.9–12.7)
WBC #/AREA STL HPF: NORMAL /[HPF]

## 2021-05-01 PROCEDURE — 89055 LEUKOCYTE ASSESSMENT FECAL: CPT | Performed by: EMERGENCY MEDICINE

## 2021-05-01 PROCEDURE — 63600175 PHARM REV CODE 636 W HCPCS: Performed by: INTERNAL MEDICINE

## 2021-05-01 PROCEDURE — S0030 INJECTION, METRONIDAZOLE: HCPCS | Performed by: NURSE PRACTITIONER

## 2021-05-01 PROCEDURE — 83735 ASSAY OF MAGNESIUM: CPT | Performed by: NURSE PRACTITIONER

## 2021-05-01 PROCEDURE — 97530 THERAPEUTIC ACTIVITIES: CPT

## 2021-05-01 PROCEDURE — 96372 THER/PROPH/DIAG INJ SC/IM: CPT | Mod: 59

## 2021-05-01 PROCEDURE — 25000003 PHARM REV CODE 250: Performed by: INTERNAL MEDICINE

## 2021-05-01 PROCEDURE — 97167 OT EVAL HIGH COMPLEX 60 MIN: CPT

## 2021-05-01 PROCEDURE — 36415 COLL VENOUS BLD VENIPUNCTURE: CPT | Performed by: NURSE PRACTITIONER

## 2021-05-01 PROCEDURE — G0378 HOSPITAL OBSERVATION PER HR: HCPCS

## 2021-05-01 PROCEDURE — 87449 NOS EACH ORGANISM AG IA: CPT | Performed by: EMERGENCY MEDICINE

## 2021-05-01 PROCEDURE — 87046 STOOL CULTR AEROBIC BACT EA: CPT | Performed by: EMERGENCY MEDICINE

## 2021-05-01 PROCEDURE — 97535 SELF CARE MNGMENT TRAINING: CPT

## 2021-05-01 PROCEDURE — 87324 CLOSTRIDIUM AG IA: CPT | Performed by: EMERGENCY MEDICINE

## 2021-05-01 PROCEDURE — 83036 HEMOGLOBIN GLYCOSYLATED A1C: CPT | Performed by: NURSE PRACTITIONER

## 2021-05-01 PROCEDURE — 97162 PT EVAL MOD COMPLEX 30 MIN: CPT

## 2021-05-01 PROCEDURE — 87015 SPECIMEN INFECT AGNT CONCNTJ: CPT | Mod: 59 | Performed by: EMERGENCY MEDICINE

## 2021-05-01 PROCEDURE — 82272 OCCULT BLD FECES 1-3 TESTS: CPT | Performed by: EMERGENCY MEDICINE

## 2021-05-01 PROCEDURE — 87209 SMEAR COMPLEX STAIN: CPT | Performed by: EMERGENCY MEDICINE

## 2021-05-01 PROCEDURE — 85025 COMPLETE CBC W/AUTO DIFF WBC: CPT | Performed by: NURSE PRACTITIONER

## 2021-05-01 PROCEDURE — 96376 TX/PRO/DX INJ SAME DRUG ADON: CPT

## 2021-05-01 PROCEDURE — 63600175 PHARM REV CODE 636 W HCPCS: Performed by: NURSE PRACTITIONER

## 2021-05-01 PROCEDURE — 80053 COMPREHEN METABOLIC PANEL: CPT | Performed by: NURSE PRACTITIONER

## 2021-05-01 PROCEDURE — 25000003 PHARM REV CODE 250: Performed by: NURSE PRACTITIONER

## 2021-05-01 PROCEDURE — 87045 FECES CULTURE AEROBIC BACT: CPT | Performed by: EMERGENCY MEDICINE

## 2021-05-01 RX ORDER — LOSARTAN POTASSIUM 50 MG/1
100 TABLET ORAL DAILY
Status: DISCONTINUED | OUTPATIENT
Start: 2021-05-01 | End: 2021-05-06 | Stop reason: HOSPADM

## 2021-05-01 RX ORDER — ATORVASTATIN CALCIUM 20 MG/1
20 TABLET, FILM COATED ORAL NIGHTLY
Status: DISCONTINUED | OUTPATIENT
Start: 2021-05-01 | End: 2021-05-06 | Stop reason: HOSPADM

## 2021-05-01 RX ORDER — ACETAMINOPHEN 325 MG/1
650 TABLET ORAL EVERY 4 HOURS PRN
Status: DISCONTINUED | OUTPATIENT
Start: 2021-05-01 | End: 2021-05-06 | Stop reason: HOSPADM

## 2021-05-01 RX ORDER — SYRING-NEEDL,DISP,INSUL,0.3 ML 29 G X1/2"
296 SYRINGE, EMPTY DISPOSABLE MISCELLANEOUS 2 TIMES DAILY
Status: DISCONTINUED | OUTPATIENT
Start: 2021-05-01 | End: 2021-05-05

## 2021-05-01 RX ORDER — GABAPENTIN 100 MG/1
100 CAPSULE ORAL 3 TIMES DAILY
Status: DISCONTINUED | OUTPATIENT
Start: 2021-05-01 | End: 2021-05-06 | Stop reason: HOSPADM

## 2021-05-01 RX ORDER — ASCORBIC ACID 500 MG
1000 TABLET ORAL DAILY
Status: DISCONTINUED | OUTPATIENT
Start: 2021-05-01 | End: 2021-05-06 | Stop reason: HOSPADM

## 2021-05-01 RX ORDER — SODIUM CHLORIDE 0.9 % (FLUSH) 0.9 %
10 SYRINGE (ML) INJECTION
Status: DISCONTINUED | OUTPATIENT
Start: 2021-05-01 | End: 2021-05-06 | Stop reason: HOSPADM

## 2021-05-01 RX ORDER — LACTULOSE 10 G/15ML
30 SOLUTION ORAL DAILY
Status: DISCONTINUED | OUTPATIENT
Start: 2021-05-01 | End: 2021-05-06 | Stop reason: HOSPADM

## 2021-05-01 RX ORDER — ONDANSETRON 2 MG/ML
4 INJECTION INTRAMUSCULAR; INTRAVENOUS EVERY 6 HOURS PRN
Status: DISCONTINUED | OUTPATIENT
Start: 2021-05-01 | End: 2021-05-06 | Stop reason: HOSPADM

## 2021-05-01 RX ORDER — CALCIUM CARBONATE 200(500)MG
2 TABLET,CHEWABLE ORAL 3 TIMES DAILY PRN
Status: DISCONTINUED | OUTPATIENT
Start: 2021-05-01 | End: 2021-05-06 | Stop reason: HOSPADM

## 2021-05-01 RX ORDER — NITROGLYCERIN 0.4 MG/1
0.4 TABLET SUBLINGUAL EVERY 5 MIN PRN
Status: DISCONTINUED | OUTPATIENT
Start: 2021-05-01 | End: 2021-05-06 | Stop reason: HOSPADM

## 2021-05-01 RX ORDER — PANTOPRAZOLE SODIUM 40 MG/1
40 TABLET, DELAYED RELEASE ORAL
Status: DISCONTINUED | OUTPATIENT
Start: 2021-05-01 | End: 2021-05-06 | Stop reason: HOSPADM

## 2021-05-01 RX ORDER — CARVEDILOL 6.25 MG/1
6.25 TABLET ORAL 2 TIMES DAILY
Status: DISCONTINUED | OUTPATIENT
Start: 2021-05-01 | End: 2021-05-06 | Stop reason: HOSPADM

## 2021-05-01 RX ADMIN — ATORVASTATIN CALCIUM 20 MG: 20 TABLET, FILM COATED ORAL at 08:05

## 2021-05-01 RX ADMIN — CARVEDILOL 6.25 MG: 6.25 TABLET, FILM COATED ORAL at 08:05

## 2021-05-01 RX ADMIN — GABAPENTIN 100 MG: 100 CAPSULE ORAL at 08:05

## 2021-05-01 RX ADMIN — LACTULOSE 30 G: 20 SOLUTION ORAL at 02:05

## 2021-05-01 RX ADMIN — METRONIDAZOLE 500 MG: 500 INJECTION, SOLUTION INTRAVENOUS at 02:05

## 2021-05-01 RX ADMIN — METRONIDAZOLE 500 MG: 500 INJECTION, SOLUTION INTRAVENOUS at 06:05

## 2021-05-01 RX ADMIN — ATORVASTATIN CALCIUM 20 MG: 20 TABLET, FILM COATED ORAL at 02:05

## 2021-05-01 RX ADMIN — ASCORBIC ACID (VITAMIN C) 500 MG TABLET 1000 MG: TABLET at 08:05

## 2021-05-01 RX ADMIN — GABAPENTIN 100 MG: 100 CAPSULE ORAL at 03:05

## 2021-05-01 RX ADMIN — MAGNESIUM CITRATE 296 ML: 1.75 LIQUID ORAL at 08:05

## 2021-05-01 RX ADMIN — METRONIDAZOLE 500 MG: 500 INJECTION, SOLUTION INTRAVENOUS at 09:05

## 2021-05-01 RX ADMIN — PANTOPRAZOLE SODIUM 40 MG: 40 TABLET, DELAYED RELEASE ORAL at 06:05

## 2021-05-01 RX ADMIN — MAGNESIUM CITRATE 296 ML: 1.75 LIQUID ORAL at 06:05

## 2021-05-01 RX ADMIN — LEVOFLOXACIN 750 MG: 5 INJECTION, SOLUTION INTRAVENOUS at 08:05

## 2021-05-01 RX ADMIN — METHYLNALTREXONE BROMIDE 12 MG: 12 INJECTION, SOLUTION SUBCUTANEOUS at 03:05

## 2021-05-01 RX ADMIN — TICAGRELOR 90 MG: 90 TABLET ORAL at 08:05

## 2021-05-01 RX ADMIN — TICAGRELOR 90 MG: 90 TABLET ORAL at 02:05

## 2021-05-01 RX ADMIN — LOSARTAN POTASSIUM 100 MG: 50 TABLET, FILM COATED ORAL at 08:05

## 2021-05-02 LAB
ALBUMIN SERPL BCP-MCNC: 3.6 G/DL (ref 3.5–5.2)
ALP SERPL-CCNC: 65 U/L (ref 55–135)
ALT SERPL W/O P-5'-P-CCNC: 32 U/L (ref 10–44)
ANION GAP SERPL CALC-SCNC: 11 MMOL/L (ref 8–16)
AST SERPL-CCNC: 27 U/L (ref 10–40)
BASOPHILS # BLD AUTO: 0.02 K/UL (ref 0–0.2)
BASOPHILS NFR BLD: 0.2 % (ref 0–1.9)
BILIRUB SERPL-MCNC: 1 MG/DL (ref 0.1–1)
BUN SERPL-MCNC: 13 MG/DL (ref 8–23)
CALCIUM SERPL-MCNC: 8.8 MG/DL (ref 8.7–10.5)
CHLORIDE SERPL-SCNC: 107 MMOL/L (ref 95–110)
CO2 SERPL-SCNC: 23 MMOL/L (ref 23–29)
CREAT SERPL-MCNC: 0.9 MG/DL (ref 0.5–1.4)
DIFFERENTIAL METHOD: ABNORMAL
EOSINOPHIL # BLD AUTO: 0.3 K/UL (ref 0–0.5)
EOSINOPHIL NFR BLD: 3.7 % (ref 0–8)
ERYTHROCYTE [DISTWIDTH] IN BLOOD BY AUTOMATED COUNT: 13.9 % (ref 11.5–14.5)
EST. GFR  (AFRICAN AMERICAN): >60 ML/MIN/1.73 M^2
EST. GFR  (NON AFRICAN AMERICAN): >60 ML/MIN/1.73 M^2
GLUCOSE SERPL-MCNC: 86 MG/DL (ref 70–110)
HCT VFR BLD AUTO: 42.1 % (ref 40–54)
HGB BLD-MCNC: 13.9 G/DL (ref 14–18)
IMM GRANULOCYTES # BLD AUTO: 0.05 K/UL (ref 0–0.04)
IMM GRANULOCYTES NFR BLD AUTO: 0.6 % (ref 0–0.5)
LYMPHOCYTES # BLD AUTO: 0.7 K/UL (ref 1–4.8)
LYMPHOCYTES NFR BLD: 8.1 % (ref 18–48)
MAGNESIUM SERPL-MCNC: 2.2 MG/DL (ref 1.6–2.6)
MCH RBC QN AUTO: 30.3 PG (ref 27–31)
MCHC RBC AUTO-ENTMCNC: 33 G/DL (ref 32–36)
MCV RBC AUTO: 92 FL (ref 82–98)
MONOCYTES # BLD AUTO: 1.1 K/UL (ref 0.3–1)
MONOCYTES NFR BLD: 12.9 % (ref 4–15)
NEUTROPHILS # BLD AUTO: 6.2 K/UL (ref 1.8–7.7)
NEUTROPHILS NFR BLD: 74.5 % (ref 38–73)
NRBC BLD-RTO: 0 /100 WBC
PLATELET # BLD AUTO: 224 K/UL (ref 150–450)
PMV BLD AUTO: 10.7 FL (ref 9.2–12.9)
POTASSIUM SERPL-SCNC: 3.7 MMOL/L (ref 3.5–5.1)
PROT SERPL-MCNC: 6.2 G/DL (ref 6–8.4)
RBC # BLD AUTO: 4.58 M/UL (ref 4.6–6.2)
SODIUM SERPL-SCNC: 141 MMOL/L (ref 136–145)
WBC # BLD AUTO: 8.3 K/UL (ref 3.9–12.7)

## 2021-05-02 PROCEDURE — 85025 COMPLETE CBC W/AUTO DIFF WBC: CPT | Performed by: NURSE PRACTITIONER

## 2021-05-02 PROCEDURE — G0378 HOSPITAL OBSERVATION PER HR: HCPCS

## 2021-05-02 PROCEDURE — 25000003 PHARM REV CODE 250: Performed by: INTERNAL MEDICINE

## 2021-05-02 PROCEDURE — 25000003 PHARM REV CODE 250: Performed by: NURSE PRACTITIONER

## 2021-05-02 PROCEDURE — S0030 INJECTION, METRONIDAZOLE: HCPCS | Performed by: NURSE PRACTITIONER

## 2021-05-02 PROCEDURE — 96375 TX/PRO/DX INJ NEW DRUG ADDON: CPT

## 2021-05-02 PROCEDURE — 83735 ASSAY OF MAGNESIUM: CPT | Performed by: NURSE PRACTITIONER

## 2021-05-02 PROCEDURE — 80053 COMPREHEN METABOLIC PANEL: CPT | Performed by: NURSE PRACTITIONER

## 2021-05-02 PROCEDURE — 30200315 PPD INTRADERMAL TEST REV CODE 302: Performed by: STUDENT IN AN ORGANIZED HEALTH CARE EDUCATION/TRAINING PROGRAM

## 2021-05-02 PROCEDURE — 63600175 PHARM REV CODE 636 W HCPCS: Performed by: INTERNAL MEDICINE

## 2021-05-02 PROCEDURE — 86580 TB INTRADERMAL TEST: CPT | Performed by: STUDENT IN AN ORGANIZED HEALTH CARE EDUCATION/TRAINING PROGRAM

## 2021-05-02 PROCEDURE — 45378 DIAGNOSTIC COLONOSCOPY: CPT | Performed by: INTERNAL MEDICINE

## 2021-05-02 PROCEDURE — 36415 COLL VENOUS BLD VENIPUNCTURE: CPT | Performed by: NURSE PRACTITIONER

## 2021-05-02 PROCEDURE — 63600175 PHARM REV CODE 636 W HCPCS: Performed by: NURSE PRACTITIONER

## 2021-05-02 PROCEDURE — 96376 TX/PRO/DX INJ SAME DRUG ADON: CPT

## 2021-05-02 RX ORDER — IPRATROPIUM BROMIDE 21 UG/1
2 SPRAY, METERED NASAL 2 TIMES DAILY PRN
Status: DISCONTINUED | OUTPATIENT
Start: 2021-05-02 | End: 2021-05-06 | Stop reason: HOSPADM

## 2021-05-02 RX ORDER — SODIUM CHLORIDE 9 MG/ML
INJECTION, SOLUTION INTRAVENOUS CONTINUOUS PRN
Status: COMPLETED | OUTPATIENT
Start: 2021-05-02 | End: 2021-05-02

## 2021-05-02 RX ORDER — MIDAZOLAM HYDROCHLORIDE 1 MG/ML
INJECTION INTRAMUSCULAR; INTRAVENOUS
Status: COMPLETED | OUTPATIENT
Start: 2021-05-02 | End: 2021-05-02

## 2021-05-02 RX ORDER — BENZONATATE 100 MG/1
100 CAPSULE ORAL 3 TIMES DAILY PRN
Status: DISCONTINUED | OUTPATIENT
Start: 2021-05-02 | End: 2021-05-06 | Stop reason: HOSPADM

## 2021-05-02 RX ORDER — MEPERIDINE HYDROCHLORIDE 100 MG/ML
INJECTION INTRAMUSCULAR; INTRAVENOUS; SUBCUTANEOUS
Status: COMPLETED | OUTPATIENT
Start: 2021-05-02 | End: 2021-05-02

## 2021-05-02 RX ADMIN — METRONIDAZOLE 500 MG: 500 INJECTION, SOLUTION INTRAVENOUS at 05:05

## 2021-05-02 RX ADMIN — ASCORBIC ACID (VITAMIN C) 500 MG TABLET 1000 MG: TABLET at 09:05

## 2021-05-02 RX ADMIN — TICAGRELOR 90 MG: 90 TABLET ORAL at 09:05

## 2021-05-02 RX ADMIN — ATORVASTATIN CALCIUM 20 MG: 20 TABLET, FILM COATED ORAL at 08:05

## 2021-05-02 RX ADMIN — GABAPENTIN 100 MG: 100 CAPSULE ORAL at 02:05

## 2021-05-02 RX ADMIN — METRONIDAZOLE 500 MG: 500 INJECTION, SOLUTION INTRAVENOUS at 09:05

## 2021-05-02 RX ADMIN — LACTULOSE 30 G: 20 SOLUTION ORAL at 09:05

## 2021-05-02 RX ADMIN — LEVOFLOXACIN 750 MG: 5 INJECTION, SOLUTION INTRAVENOUS at 08:05

## 2021-05-02 RX ADMIN — LOSARTAN POTASSIUM 100 MG: 50 TABLET, FILM COATED ORAL at 09:05

## 2021-05-02 RX ADMIN — TICAGRELOR 90 MG: 90 TABLET ORAL at 08:05

## 2021-05-02 RX ADMIN — Medication 50 MG: at 12:05

## 2021-05-02 RX ADMIN — GABAPENTIN 100 MG: 100 CAPSULE ORAL at 08:05

## 2021-05-02 RX ADMIN — CARVEDILOL 6.25 MG: 6.25 TABLET, FILM COATED ORAL at 09:05

## 2021-05-02 RX ADMIN — MIDAZOLAM HYDROCHLORIDE 2 MG: 1 INJECTION, SOLUTION INTRAMUSCULAR; INTRAVENOUS at 12:05

## 2021-05-02 RX ADMIN — MAGNESIUM CITRATE 296 ML: 1.75 LIQUID ORAL at 09:05

## 2021-05-02 RX ADMIN — METRONIDAZOLE 500 MG: 500 INJECTION, SOLUTION INTRAVENOUS at 02:05

## 2021-05-02 RX ADMIN — SODIUM CHLORIDE 1000 ML: 0.9 INJECTION, SOLUTION INTRAVENOUS at 12:05

## 2021-05-02 RX ADMIN — CARVEDILOL 6.25 MG: 6.25 TABLET, FILM COATED ORAL at 08:05

## 2021-05-02 RX ADMIN — GABAPENTIN 100 MG: 100 CAPSULE ORAL at 09:05

## 2021-05-03 LAB
ALBUMIN SERPL BCP-MCNC: 3.8 G/DL (ref 3.5–5.2)
ALP SERPL-CCNC: 70 U/L (ref 55–135)
ALT SERPL W/O P-5'-P-CCNC: 42 U/L (ref 10–44)
ANION GAP SERPL CALC-SCNC: 8 MMOL/L (ref 8–16)
AST SERPL-CCNC: 38 U/L (ref 10–40)
BASOPHILS # BLD AUTO: 0.03 K/UL (ref 0–0.2)
BASOPHILS NFR BLD: 0.3 % (ref 0–1.9)
BILIRUB SERPL-MCNC: 0.9 MG/DL (ref 0.1–1)
BUN SERPL-MCNC: 12 MG/DL (ref 8–23)
CALCIUM SERPL-MCNC: 8.6 MG/DL (ref 8.7–10.5)
CHLORIDE SERPL-SCNC: 108 MMOL/L (ref 95–110)
CO2 SERPL-SCNC: 23 MMOL/L (ref 23–29)
CREAT SERPL-MCNC: 0.8 MG/DL (ref 0.5–1.4)
DIFFERENTIAL METHOD: ABNORMAL
EOSINOPHIL # BLD AUTO: 0.3 K/UL (ref 0–0.5)
EOSINOPHIL NFR BLD: 3.7 % (ref 0–8)
ERYTHROCYTE [DISTWIDTH] IN BLOOD BY AUTOMATED COUNT: 13.9 % (ref 11.5–14.5)
EST. GFR  (AFRICAN AMERICAN): >60 ML/MIN/1.73 M^2
EST. GFR  (NON AFRICAN AMERICAN): >60 ML/MIN/1.73 M^2
GLUCOSE SERPL-MCNC: 91 MG/DL (ref 70–110)
HCT VFR BLD AUTO: 42 % (ref 40–54)
HGB BLD-MCNC: 13.8 G/DL (ref 14–18)
IMM GRANULOCYTES # BLD AUTO: 0.06 K/UL (ref 0–0.04)
IMM GRANULOCYTES NFR BLD AUTO: 0.7 % (ref 0–0.5)
LYMPHOCYTES # BLD AUTO: 0.8 K/UL (ref 1–4.8)
LYMPHOCYTES NFR BLD: 8.5 % (ref 18–48)
MAGNESIUM SERPL-MCNC: 2.1 MG/DL (ref 1.6–2.6)
MCH RBC QN AUTO: 30.1 PG (ref 27–31)
MCHC RBC AUTO-ENTMCNC: 32.9 G/DL (ref 32–36)
MCV RBC AUTO: 92 FL (ref 82–98)
MONOCYTES # BLD AUTO: 1.1 K/UL (ref 0.3–1)
MONOCYTES NFR BLD: 11.8 % (ref 4–15)
NEUTROPHILS # BLD AUTO: 6.9 K/UL (ref 1.8–7.7)
NEUTROPHILS NFR BLD: 75 % (ref 38–73)
NRBC BLD-RTO: 0 /100 WBC
PLATELET # BLD AUTO: 216 K/UL (ref 150–450)
PMV BLD AUTO: 10.6 FL (ref 9.2–12.9)
POTASSIUM SERPL-SCNC: 3.6 MMOL/L (ref 3.5–5.1)
PROT SERPL-MCNC: 6.5 G/DL (ref 6–8.4)
RBC # BLD AUTO: 4.59 M/UL (ref 4.6–6.2)
SODIUM SERPL-SCNC: 139 MMOL/L (ref 136–145)
STOOL CULTURE: NORMAL
STOOL CULTURE: NORMAL
WBC # BLD AUTO: 9.15 K/UL (ref 3.9–12.7)

## 2021-05-03 PROCEDURE — 80053 COMPREHEN METABOLIC PANEL: CPT | Performed by: NURSE PRACTITIONER

## 2021-05-03 PROCEDURE — 25000003 PHARM REV CODE 250: Performed by: NURSE PRACTITIONER

## 2021-05-03 PROCEDURE — 97530 THERAPEUTIC ACTIVITIES: CPT | Mod: CQ

## 2021-05-03 PROCEDURE — 83735 ASSAY OF MAGNESIUM: CPT | Performed by: NURSE PRACTITIONER

## 2021-05-03 PROCEDURE — 25000003 PHARM REV CODE 250: Performed by: STUDENT IN AN ORGANIZED HEALTH CARE EDUCATION/TRAINING PROGRAM

## 2021-05-03 PROCEDURE — 97110 THERAPEUTIC EXERCISES: CPT

## 2021-05-03 PROCEDURE — S0030 INJECTION, METRONIDAZOLE: HCPCS | Performed by: NURSE PRACTITIONER

## 2021-05-03 PROCEDURE — G0378 HOSPITAL OBSERVATION PER HR: HCPCS

## 2021-05-03 PROCEDURE — 36415 COLL VENOUS BLD VENIPUNCTURE: CPT | Performed by: NURSE PRACTITIONER

## 2021-05-03 PROCEDURE — 96376 TX/PRO/DX INJ SAME DRUG ADON: CPT | Mod: 59

## 2021-05-03 PROCEDURE — 85025 COMPLETE CBC W/AUTO DIFF WBC: CPT | Performed by: NURSE PRACTITIONER

## 2021-05-03 RX ORDER — LEVOFLOXACIN 750 MG/1
750 TABLET ORAL DAILY
Status: DISCONTINUED | OUTPATIENT
Start: 2021-05-04 | End: 2021-05-06 | Stop reason: HOSPADM

## 2021-05-03 RX ORDER — METRONIDAZOLE 250 MG/1
500 TABLET ORAL EVERY 8 HOURS
Status: DISCONTINUED | OUTPATIENT
Start: 2021-05-03 | End: 2021-05-06 | Stop reason: HOSPADM

## 2021-05-03 RX ADMIN — ASCORBIC ACID (VITAMIN C) 500 MG TABLET 1000 MG: TABLET at 08:05

## 2021-05-03 RX ADMIN — TICAGRELOR 90 MG: 90 TABLET ORAL at 09:05

## 2021-05-03 RX ADMIN — CARVEDILOL 6.25 MG: 6.25 TABLET, FILM COATED ORAL at 09:05

## 2021-05-03 RX ADMIN — METRONIDAZOLE 500 MG: 500 INJECTION, SOLUTION INTRAVENOUS at 09:05

## 2021-05-03 RX ADMIN — LOSARTAN POTASSIUM 100 MG: 50 TABLET, FILM COATED ORAL at 08:05

## 2021-05-03 RX ADMIN — METRONIDAZOLE 500 MG: 250 TABLET ORAL at 05:05

## 2021-05-03 RX ADMIN — GABAPENTIN 100 MG: 100 CAPSULE ORAL at 09:05

## 2021-05-03 RX ADMIN — TICAGRELOR 90 MG: 90 TABLET ORAL at 08:05

## 2021-05-03 RX ADMIN — CARVEDILOL 6.25 MG: 6.25 TABLET, FILM COATED ORAL at 08:05

## 2021-05-03 RX ADMIN — PANTOPRAZOLE SODIUM 40 MG: 40 TABLET, DELAYED RELEASE ORAL at 05:05

## 2021-05-03 RX ADMIN — METRONIDAZOLE 500 MG: 500 INJECTION, SOLUTION INTRAVENOUS at 01:05

## 2021-05-03 RX ADMIN — GABAPENTIN 100 MG: 100 CAPSULE ORAL at 03:05

## 2021-05-03 RX ADMIN — GABAPENTIN 100 MG: 100 CAPSULE ORAL at 08:05

## 2021-05-03 RX ADMIN — ATORVASTATIN CALCIUM 20 MG: 20 TABLET, FILM COATED ORAL at 09:05

## 2021-05-04 PROCEDURE — 96365 THER/PROPH/DIAG IV INF INIT: CPT | Mod: 59

## 2021-05-04 PROCEDURE — 97110 THERAPEUTIC EXERCISES: CPT

## 2021-05-04 PROCEDURE — 96376 TX/PRO/DX INJ SAME DRUG ADON: CPT

## 2021-05-04 PROCEDURE — 25000003 PHARM REV CODE 250: Performed by: NURSE PRACTITIONER

## 2021-05-04 PROCEDURE — 25000003 PHARM REV CODE 250: Performed by: STUDENT IN AN ORGANIZED HEALTH CARE EDUCATION/TRAINING PROGRAM

## 2021-05-04 PROCEDURE — G0378 HOSPITAL OBSERVATION PER HR: HCPCS

## 2021-05-04 RX ADMIN — CARVEDILOL 6.25 MG: 6.25 TABLET, FILM COATED ORAL at 08:05

## 2021-05-04 RX ADMIN — TICAGRELOR 90 MG: 90 TABLET ORAL at 08:05

## 2021-05-04 RX ADMIN — GABAPENTIN 100 MG: 100 CAPSULE ORAL at 08:05

## 2021-05-04 RX ADMIN — ATORVASTATIN CALCIUM 20 MG: 20 TABLET, FILM COATED ORAL at 08:05

## 2021-05-04 RX ADMIN — ASCORBIC ACID (VITAMIN C) 500 MG TABLET 1000 MG: TABLET at 08:05

## 2021-05-04 RX ADMIN — Medication: at 09:05

## 2021-05-04 RX ADMIN — METRONIDAZOLE 500 MG: 250 TABLET ORAL at 02:05

## 2021-05-04 RX ADMIN — METRONIDAZOLE 500 MG: 250 TABLET ORAL at 10:05

## 2021-05-04 RX ADMIN — PANTOPRAZOLE SODIUM 40 MG: 40 TABLET, DELAYED RELEASE ORAL at 05:05

## 2021-05-04 RX ADMIN — GABAPENTIN 100 MG: 100 CAPSULE ORAL at 02:05

## 2021-05-04 RX ADMIN — LACTULOSE 30 G: 20 SOLUTION ORAL at 08:05

## 2021-05-04 RX ADMIN — METRONIDAZOLE 500 MG: 250 TABLET ORAL at 05:05

## 2021-05-04 RX ADMIN — LOSARTAN POTASSIUM 100 MG: 50 TABLET, FILM COATED ORAL at 08:05

## 2021-05-05 LAB
ANION GAP SERPL CALC-SCNC: 7 MMOL/L (ref 8–16)
BACTERIA BLD CULT: NORMAL
BACTERIA BLD CULT: NORMAL
BUN SERPL-MCNC: 12 MG/DL (ref 8–23)
CALCIUM SERPL-MCNC: 9 MG/DL (ref 8.7–10.5)
CHLORIDE SERPL-SCNC: 107 MMOL/L (ref 95–110)
CO2 SERPL-SCNC: 26 MMOL/L (ref 23–29)
CREAT SERPL-MCNC: 0.7 MG/DL (ref 0.5–1.4)
ERYTHROCYTE [DISTWIDTH] IN BLOOD BY AUTOMATED COUNT: 13.7 % (ref 11.5–14.5)
EST. GFR  (AFRICAN AMERICAN): >60 ML/MIN/1.73 M^2
EST. GFR  (NON AFRICAN AMERICAN): >60 ML/MIN/1.73 M^2
GLUCOSE SERPL-MCNC: 97 MG/DL (ref 70–110)
HCT VFR BLD AUTO: 44.8 % (ref 40–54)
HGB BLD-MCNC: 14.5 G/DL (ref 14–18)
MCH RBC QN AUTO: 29.7 PG (ref 27–31)
MCHC RBC AUTO-ENTMCNC: 32.4 G/DL (ref 32–36)
MCV RBC AUTO: 92 FL (ref 82–98)
PLATELET # BLD AUTO: 225 K/UL (ref 150–450)
PMV BLD AUTO: 10.4 FL (ref 9.2–12.9)
POTASSIUM SERPL-SCNC: 3.7 MMOL/L (ref 3.5–5.1)
RBC # BLD AUTO: 4.89 M/UL (ref 4.6–6.2)
SODIUM SERPL-SCNC: 140 MMOL/L (ref 136–145)
WBC # BLD AUTO: 9.31 K/UL (ref 3.9–12.7)

## 2021-05-05 PROCEDURE — 97530 THERAPEUTIC ACTIVITIES: CPT | Mod: CQ

## 2021-05-05 PROCEDURE — 80048 BASIC METABOLIC PNL TOTAL CA: CPT | Performed by: STUDENT IN AN ORGANIZED HEALTH CARE EDUCATION/TRAINING PROGRAM

## 2021-05-05 PROCEDURE — 85027 COMPLETE CBC AUTOMATED: CPT | Performed by: STUDENT IN AN ORGANIZED HEALTH CARE EDUCATION/TRAINING PROGRAM

## 2021-05-05 PROCEDURE — 25000003 PHARM REV CODE 250: Performed by: STUDENT IN AN ORGANIZED HEALTH CARE EDUCATION/TRAINING PROGRAM

## 2021-05-05 PROCEDURE — 36415 COLL VENOUS BLD VENIPUNCTURE: CPT | Performed by: STUDENT IN AN ORGANIZED HEALTH CARE EDUCATION/TRAINING PROGRAM

## 2021-05-05 PROCEDURE — 97110 THERAPEUTIC EXERCISES: CPT

## 2021-05-05 PROCEDURE — 25000003 PHARM REV CODE 250: Performed by: NURSE PRACTITIONER

## 2021-05-05 PROCEDURE — G0378 HOSPITAL OBSERVATION PER HR: HCPCS

## 2021-05-05 RX ADMIN — ATORVASTATIN CALCIUM 20 MG: 20 TABLET, FILM COATED ORAL at 08:05

## 2021-05-05 RX ADMIN — ASCORBIC ACID (VITAMIN C) 500 MG TABLET 1000 MG: TABLET at 09:05

## 2021-05-05 RX ADMIN — METRONIDAZOLE 500 MG: 250 TABLET ORAL at 05:05

## 2021-05-05 RX ADMIN — LACTULOSE 20 G: 20 SOLUTION ORAL at 09:05

## 2021-05-05 RX ADMIN — PANTOPRAZOLE SODIUM 40 MG: 40 TABLET, DELAYED RELEASE ORAL at 05:05

## 2021-05-05 RX ADMIN — CARVEDILOL 6.25 MG: 6.25 TABLET, FILM COATED ORAL at 09:05

## 2021-05-05 RX ADMIN — LEVOFLOXACIN 750 MG: 750 TABLET, FILM COATED ORAL at 09:05

## 2021-05-05 RX ADMIN — METRONIDAZOLE 500 MG: 250 TABLET ORAL at 09:05

## 2021-05-05 RX ADMIN — CARVEDILOL 6.25 MG: 6.25 TABLET, FILM COATED ORAL at 08:05

## 2021-05-05 RX ADMIN — GABAPENTIN 100 MG: 100 CAPSULE ORAL at 02:05

## 2021-05-05 RX ADMIN — Medication: at 09:05

## 2021-05-05 RX ADMIN — METRONIDAZOLE 500 MG: 250 TABLET ORAL at 01:05

## 2021-05-05 RX ADMIN — GABAPENTIN 100 MG: 100 CAPSULE ORAL at 08:05

## 2021-05-05 RX ADMIN — GABAPENTIN 100 MG: 100 CAPSULE ORAL at 09:05

## 2021-05-05 RX ADMIN — TICAGRELOR 90 MG: 90 TABLET ORAL at 09:05

## 2021-05-05 RX ADMIN — LOSARTAN POTASSIUM 100 MG: 50 TABLET, FILM COATED ORAL at 09:05

## 2021-05-05 RX ADMIN — TICAGRELOR 90 MG: 90 TABLET ORAL at 08:05

## 2021-05-06 VITALS
SYSTOLIC BLOOD PRESSURE: 142 MMHG | HEART RATE: 78 BPM | BODY MASS INDEX: 27.76 KG/M2 | HEIGHT: 68 IN | TEMPERATURE: 98 F | OXYGEN SATURATION: 94 % | RESPIRATION RATE: 18 BRPM | DIASTOLIC BLOOD PRESSURE: 88 MMHG | WEIGHT: 183.19 LBS

## 2021-05-06 PROBLEM — K52.9 COLITIS: Status: RESOLVED | Noted: 2021-04-30 | Resolved: 2021-05-06

## 2021-05-06 PROBLEM — K56.41 FECAL IMPACTION: Status: RESOLVED | Noted: 2021-05-01 | Resolved: 2021-05-06

## 2021-05-06 LAB
ANION GAP SERPL CALC-SCNC: 10 MMOL/L (ref 8–16)
BUN SERPL-MCNC: 13 MG/DL (ref 8–23)
CALCIUM SERPL-MCNC: 8.9 MG/DL (ref 8.7–10.5)
CHLORIDE SERPL-SCNC: 105 MMOL/L (ref 95–110)
CO2 SERPL-SCNC: 24 MMOL/L (ref 23–29)
CREAT SERPL-MCNC: 0.7 MG/DL (ref 0.5–1.4)
ERYTHROCYTE [DISTWIDTH] IN BLOOD BY AUTOMATED COUNT: 13.6 % (ref 11.5–14.5)
EST. GFR  (AFRICAN AMERICAN): >60 ML/MIN/1.73 M^2
EST. GFR  (NON AFRICAN AMERICAN): >60 ML/MIN/1.73 M^2
GLUCOSE SERPL-MCNC: 97 MG/DL (ref 70–110)
HCT VFR BLD AUTO: 43.2 % (ref 40–54)
HGB BLD-MCNC: 14.4 G/DL (ref 14–18)
MCH RBC QN AUTO: 30.4 PG (ref 27–31)
MCHC RBC AUTO-ENTMCNC: 33.3 G/DL (ref 32–36)
MCV RBC AUTO: 91 FL (ref 82–98)
PLATELET # BLD AUTO: 225 K/UL (ref 150–450)
PMV BLD AUTO: 10.9 FL (ref 9.2–12.9)
POTASSIUM SERPL-SCNC: 3.8 MMOL/L (ref 3.5–5.1)
RBC # BLD AUTO: 4.74 M/UL (ref 4.6–6.2)
SODIUM SERPL-SCNC: 139 MMOL/L (ref 136–145)
WBC # BLD AUTO: 9.39 K/UL (ref 3.9–12.7)

## 2021-05-06 PROCEDURE — 80048 BASIC METABOLIC PNL TOTAL CA: CPT | Performed by: STUDENT IN AN ORGANIZED HEALTH CARE EDUCATION/TRAINING PROGRAM

## 2021-05-06 PROCEDURE — 97530 THERAPEUTIC ACTIVITIES: CPT | Mod: CQ

## 2021-05-06 PROCEDURE — 36415 COLL VENOUS BLD VENIPUNCTURE: CPT | Performed by: STUDENT IN AN ORGANIZED HEALTH CARE EDUCATION/TRAINING PROGRAM

## 2021-05-06 PROCEDURE — 25000003 PHARM REV CODE 250: Performed by: STUDENT IN AN ORGANIZED HEALTH CARE EDUCATION/TRAINING PROGRAM

## 2021-05-06 PROCEDURE — 85027 COMPLETE CBC AUTOMATED: CPT | Performed by: STUDENT IN AN ORGANIZED HEALTH CARE EDUCATION/TRAINING PROGRAM

## 2021-05-06 PROCEDURE — G0378 HOSPITAL OBSERVATION PER HR: HCPCS

## 2021-05-06 PROCEDURE — 25000003 PHARM REV CODE 250: Performed by: NURSE PRACTITIONER

## 2021-05-06 RX ORDER — METRONIDAZOLE 500 MG/1
500 TABLET ORAL 3 TIMES DAILY
Qty: 15 TABLET | Refills: 0 | Status: SHIPPED | OUTPATIENT
Start: 2021-05-06 | End: 2021-05-11

## 2021-05-06 RX ORDER — SENNOSIDES 8.6 MG/1
1 TABLET ORAL DAILY
Qty: 21 TABLET | Refills: 0 | Status: SHIPPED | OUTPATIENT
Start: 2021-05-06 | End: 2021-05-27

## 2021-05-06 RX ORDER — CIPROFLOXACIN 500 MG/1
500 TABLET ORAL 2 TIMES DAILY
Qty: 10 TABLET | Refills: 0 | Status: SHIPPED | OUTPATIENT
Start: 2021-05-06 | End: 2021-05-11

## 2021-05-06 RX ADMIN — ASCORBIC ACID (VITAMIN C) 500 MG TABLET 1000 MG: TABLET at 10:05

## 2021-05-06 RX ADMIN — METRONIDAZOLE 500 MG: 250 TABLET ORAL at 10:05

## 2021-05-06 RX ADMIN — LEVOFLOXACIN 750 MG: 750 TABLET, FILM COATED ORAL at 10:05

## 2021-05-06 RX ADMIN — METRONIDAZOLE 500 MG: 250 TABLET ORAL at 03:05

## 2021-05-06 RX ADMIN — Medication: at 10:05

## 2021-05-06 RX ADMIN — PANTOPRAZOLE SODIUM 40 MG: 40 TABLET, DELAYED RELEASE ORAL at 05:05

## 2021-05-06 RX ADMIN — LOSARTAN POTASSIUM 100 MG: 50 TABLET, FILM COATED ORAL at 10:05

## 2021-05-06 RX ADMIN — GABAPENTIN 100 MG: 100 CAPSULE ORAL at 10:05

## 2021-05-06 RX ADMIN — CARVEDILOL 6.25 MG: 6.25 TABLET, FILM COATED ORAL at 10:05

## 2021-05-06 RX ADMIN — TICAGRELOR 90 MG: 90 TABLET ORAL at 10:05

## 2021-05-07 ENCOUNTER — DOCUMENT SCAN (OUTPATIENT)
Dept: HOME HEALTH SERVICES | Facility: HOSPITAL | Age: 66
End: 2021-05-07
Payer: MEDICARE

## 2021-05-16 ENCOUNTER — DOCUMENT SCAN (OUTPATIENT)
Dept: HOME HEALTH SERVICES | Facility: HOSPITAL | Age: 66
End: 2021-05-16
Payer: MEDICARE

## 2021-05-20 LAB — O+P STL TRI STN: NORMAL

## 2021-05-31 ENCOUNTER — PATIENT MESSAGE (OUTPATIENT)
Dept: PSYCHIATRY | Facility: CLINIC | Age: 66
End: 2021-05-31

## 2021-09-01 ENCOUNTER — PATIENT MESSAGE (OUTPATIENT)
Dept: PSYCHIATRY | Facility: CLINIC | Age: 66
End: 2021-09-01

## 2021-09-02 ENCOUNTER — PATIENT MESSAGE (OUTPATIENT)
Dept: PSYCHIATRY | Facility: CLINIC | Age: 66
End: 2021-09-02

## 2021-09-04 ENCOUNTER — PATIENT MESSAGE (OUTPATIENT)
Dept: NEUROLOGY | Facility: CLINIC | Age: 66
End: 2021-09-04

## 2021-10-26 NOTE — H&P
[facility] Medicine  History & Physical    Patient Name: Michael German  MRN: [id number]  Admission Date: 1/28/2019  Attending Physician: Melinda Staley MD  Primary Care Provider: Av Zaragoza MD         Patient information was obtained from patient, past medical records and ER records.     Subjective:     Principal Problem:Colitis    Chief Complaint:   Chief Complaint   Patient presents with    Abdominal Pain     lower abd. pain / hx. constipation         HPI: Michael German is a 62 yo male with PMHx significant for MS, chronic bowel issues fluctuating between constipation and diarrhea, and HTN.  He presented to the ED with c/o 8/10 lower abdominal pain with radiation to his rectum.  He reports no BM x 4 days and prior to that had been only having watery stool so he began taking imodium.  He stopped the imodium once he had stopped having BMs.  He denies trying anything at home for constipation. He denies any N/V, fever/chills, CP, or SOB. He reports some recent difficulty emptying his bladder 2/2 constipation. He is wheelchair bound with MS for which he has failed all attempted treatment regimens. He was given a fleets enema in ED and has has some liquid stool since, but continues with rectal pressure and pain rated 3/10 at present.  CT with large stool burden.  Other pertinent medical history as below:    Past Medical History:   Diagnosis Date    Acute urinary retention     Acute UTI     treated twice in past three weeks with antibx per patient-since indwelling catheter    Back problem     herniated disc-lumbar region    GERD (gastroesophageal reflux disease)     Hypertension     130s/80s    MS (multiple sclerosis)     Neck stiffness     Neurogenic bladder     has indwelling catheter now    Sleep disturbance     Snoring     Visual impairment        Past Surgical History:   Procedure Laterality Date    CYSTOSCOPY N/A 4/19/2016    Performed by [facility] at  [facility] OR 1ST FLR    CYSTOSCOPY N/A 5/6/2014    Performed by [facility] at [facility]ST [facility]    DISKECTOMY AND FUSION-ANTERIOR CERVICAL (ACDF)  C3/4 ACDF N/A 8/15/2016    [facility]    FINGER SURGERY      INJECTION, BOTULINUM TOXIN, TYPE A N/A 5/6/2014    [facility] OR [facility]ST [facility]LR    INJECTION-BOTOX N/A 4/19/2016    Performed [facility] at [facility] OR 1ST [facility]LR    LASER OF PROSTATE W/ GREEN LIGHT PVP      RECTAL BOTOX INJECTION      URODYNAMIC STUDY, FLUOROSCOPIC N/A 3/12/2014    Per[facility]       Review of patient's allergies indicates:  No Known Allergies    Current Facility-Administered Medications on File Prior to Encounter   Medication    0.9%  NaCl infusion     Current Outpatient Medications on File Prior to Encounter   Medication Sig    calcium carbonate (TUMS) 200 mg calcium (500 mg) chewable tablet Take 2 tablets by mouth 3 (three) times daily as needed for Heartburn.    loperamide (IMODIUM) 2 mg capsule Take 2 mg by mouth 4 (four) times daily as needed for Diarrhea.    losartan (COZAAR) 100 MG tablet Take 100 mg by mouth once daily.    pantoprazole (PROTONIX) 40 MG tablet Take 40 mg by mouth once daily.    [DISCONTINUED] ascorbic acid (VITAMIN C) 1000 MG tablet Take 1,000 mg by mouth once daily.      [DISCONTINUED] clotrimazole (LOTRIMIN) 1 % cream Apply topically 2 (two) times daily. (Patient taking differently: Apply topically 2 (two) times daily as needed. )    [DISCONTINUED] cyanocobalamin (VITAMIN B-12) 500 MCG tablet Take 500 mcg by mouth once daily.    [DISCONTINUED] docusate sodium (COLACE) 100 MG capsule Take 1 capsule (100 mg total) by mouth once daily.    [DISCONTINUED] enalapril (VASOTEC) 20 MG tablet Take 20 mg by mouth every evening.     [DISCONTINUED] ibuprofen (ADVIL,MOTRIN) 200 MG tablet Take 400 mg by mouth every evening. Per Dr. Chau pt to hold for 10  days prior to surgery    [DISCONTINUED] magnesium oxide (MAG-OX) 400 mg tablet Take 400 mg by mouth every evening.    [DISCONTINUED] miconazole (MICOTIN) 2 % cream Apply topically 2 (two) times daily. Apply in-between toes    [DISCONTINUED] mineral oil liquid Take 30 mLs by mouth daily as needed for Constipation.    [DISCONTINUED] oxycodone-acetaminophen (PERCOCET) 7.5-325 mg per tablet Take 1 tablet by mouth every 4 (four) hours as needed.    [DISCONTINUED] pantoprazole (PROTONIX) 20 MG tablet Take 1 tablet (20 mg total) by mouth once daily. (Patient taking differently: Take 20 mg by mouth every evening. )    [DISCONTINUED] polyethylene glycol (GLYCOLAX) 17 gram PwPk Take 17 g by mouth once daily. (Patient taking differently: Take 17 g by mouth continuous prn. )    [DISCONTINUED] tizanidine (ZANAFLEX) 2 MG tablet Take 2 tablets (4 mg total) by mouth every 8 (eight) hours as needed (spasm).     Family History     Problem Relation (Age of Onset)    Dementia Mother    Hypertension Brother    No Known Problems Sister    Parkinsonism Father        Tobacco Use    Smoking status: Never Smoker    Smokeless tobacco: Never Used   Substance and Sexual Activity    Alcohol use: Yes     Comment: occasionally drinks    Drug use: No    Sexual activity: Not on file     Review of Systems   Constitutional: Negative for activity change, appetite change, chills, fatigue and fever.   HENT: Negative for congestion, postnasal drip, sore throat and trouble swallowing.    Eyes: Negative for photophobia and visual disturbance.   Respiratory: Negative for cough, shortness of breath and wheezing.    Cardiovascular: Negative for chest pain, palpitations and leg swelling.   Gastrointestinal: Positive for abdominal pain and constipation. Negative for abdominal distention, blood in stool, diarrhea, nausea and vomiting.   Genitourinary: Positive for difficulty urinating. Negative for dysuria, flank pain, hematuria and urgency.    Musculoskeletal: Negative for arthralgias and myalgias.   Skin: Negative for color change.   Neurological: Positive for weakness (chronic, unchanged). Negative for dizziness and light-headedness.   Psychiatric/Behavioral: Negative for confusion. The patient is not nervous/anxious.      Objective:     Vital Signs (Most Recent):  Temp: 97.6 °F (36.4 °C) (01/28/19 1541)  Pulse: 86 (01/28/19 1541)  Resp: 16 (01/28/19 1541)  BP: 111/74 (01/28/19 1541)  SpO2: 97 % (01/28/19 1541) Vital Signs (24h Range):  Temp:  [97.6 °F (36.4 °C)] 97.6 °F (36.4 °C)  Pulse:  [86] 86  Resp:  [16] 16  SpO2:  [97 %] 97 %  BP: (111)/(74) 111/74     Weight: 76.2 kg (168 lb)  Body mass index is 25.54 kg/m².    Physical Exam   Constitutional: He is oriented to person, place, and time. He appears well-developed and well-nourished. No distress.   HENT:   Head: Normocephalic and atraumatic.   Eyes: Conjunctivae and EOM are normal. Pupils are equal, round, and reactive to light.   Neck: Normal range of motion. Neck supple. No thyromegaly present.   Cardiovascular: Normal rate, regular rhythm, normal heart sounds and intact distal pulses.   Pulmonary/Chest: Effort normal and breath sounds normal. No respiratory distress.   Abdominal: Soft. Bowel sounds are normal. He exhibits distension. There is no tenderness.   Musculoskeletal: He exhibits no edema or tenderness.   RIGHT arm contracted; minimal movement BLEs (Chronic)     Neurological: He is alert and oriented to person, place, and time. No cranial nerve deficit.   Skin: Skin is warm and dry. Capillary refill takes less than 2 seconds. No erythema.   Sacral wound-- seems to be well-healing.  See photo.     Psychiatric: He has a normal mood and affect. His behavior is normal. Judgment and thought content normal.         CRANIAL NERVES     CN III, IV, VI   Pupils are equal, round, and reactive to light.  Extraocular motions are normal.            Significant Labs:   CBC:   Recent Labs   Lab  (1) Other Diagnosis 01/28/19  1705   WBC 15.90*   HGB 16.4   HCT 50.3        CMP:   Recent Labs   Lab 01/28/19  1705      K 4.2      CO2 27   GLU 98   BUN 13   CREATININE 0.9   CALCIUM 10.9*   PROT 7.6   ALBUMIN 4.3   BILITOT 0.9   ALKPHOS 110   AST 39   ALT 58*   ANIONGAP 10   EGFRNONAA >60       Significant Imaging:   CT A/P: Large amount of stool within the rectosigmoid colon with thickening of the perirectal fascia, consistent with stercoral colitis.    Indeterminate sclerotic lesion involving the right distal humerus.  Outpatient MRI of the right elbow with contrast may be attempted for further evaluation.    Assessment/Plan:     * Colitis    Stercoral colitis in constipated patient.  No response to fleet's enema.  Significant pain.  Will give brown bomb enema to facilitate bowel clearance and then institute bowel regimen including miralax, senna.  Consult with GI for further recommendations prior to discharge.  Not certain what role abx play here as his colitis is from constipation and we will treat cause.  Will continue cipro/flagyl until seen by GI. Hydrate. Discussed bowel rest with patient-- he is hungry and requests full liquids.       Constipation    With resultant colitis, severe pain.  Enema for bowel clearance and initiation of daily bowel regimen: miralax, senna to start.        UTI (urinary tract infection)    Continue cipro.  Follow urine culture and adjust medication as clinically indicated.        GERD (gastroesophageal reflux disease)    Chronic issue, utilize TUMS PRN as per home use.       Hypertension    Chronic, stable.  Continue losartan, monitor BP closely, and titrate medication as required for sustained BP control.       Neurogenic bladder    Urinating okay with reported incomplete emptying.  If issues with urinary retention may require straight cath.       MS (multiple sclerosis)    Failed outpatient medications; follows with Dr. Sanchez.  Has home PT/OT.  Consult while here an continue  f/u outpatient.          VTE Risk Mitigation (From admission, onward)    High: Lovenox 40 mg SQ daily             Calista Carrillo NP  Department of Hospital Medicine   [facility]

## 2021-12-01 ENCOUNTER — EXTERNAL HOSPITAL ADMISSION (OUTPATIENT)
Dept: SKILLED NURSING FACILITY | Facility: HOSPITAL | Age: 66
End: 2021-12-01
Payer: MEDICARE

## 2021-12-01 DIAGNOSIS — G35 MULTIPLE SCLEROSIS: Primary | ICD-10-CM

## 2021-12-01 PROCEDURE — 99305 1ST NF CARE MODERATE MDM 35: CPT | Mod: ,,, | Performed by: INTERNAL MEDICINE

## 2021-12-01 PROCEDURE — 99305 PR NURSING FACILITY CARE, INIT, MOD SEVERITY: ICD-10-PCS | Mod: ,,, | Performed by: INTERNAL MEDICINE

## 2021-12-08 ENCOUNTER — EXTERNAL HOSPITAL ADMISSION (OUTPATIENT)
Dept: SKILLED NURSING FACILITY | Facility: HOSPITAL | Age: 66
End: 2021-12-08
Payer: MEDICARE

## 2021-12-08 DIAGNOSIS — G35 MULTIPLE SCLEROSIS: Primary | ICD-10-CM

## 2021-12-08 PROCEDURE — 99307 SBSQ NF CARE SF MDM 10: CPT | Mod: ,,, | Performed by: INTERNAL MEDICINE

## 2021-12-08 PROCEDURE — 99307 PR NURSING FAC CARE, SUBSEQ, IMPROVING: ICD-10-PCS | Mod: ,,, | Performed by: INTERNAL MEDICINE

## 2021-12-21 ENCOUNTER — PATIENT MESSAGE (OUTPATIENT)
Dept: NEUROLOGY | Facility: CLINIC | Age: 66
End: 2021-12-21
Payer: MEDICARE

## 2021-12-27 ENCOUNTER — HOSPITAL ENCOUNTER (EMERGENCY)
Facility: HOSPITAL | Age: 66
Discharge: NURSING, REHAB OR LONG-TERM CARE | End: 2021-12-28
Attending: EMERGENCY MEDICINE
Payer: MEDICARE

## 2021-12-27 DIAGNOSIS — E87.1 HYPONATREMIA: ICD-10-CM

## 2021-12-27 DIAGNOSIS — R10.13 EPIGASTRIC PAIN: ICD-10-CM

## 2021-12-27 DIAGNOSIS — R07.9 CHEST PAIN: ICD-10-CM

## 2021-12-27 DIAGNOSIS — Z20.822 ENCOUNTER FOR LABORATORY TESTING FOR COVID-19 VIRUS: ICD-10-CM

## 2021-12-27 DIAGNOSIS — K21.9 GASTROESOPHAGEAL REFLUX DISEASE, UNSPECIFIED WHETHER ESOPHAGITIS PRESENT: Primary | ICD-10-CM

## 2021-12-27 LAB
ALBUMIN SERPL BCP-MCNC: 3.7 G/DL (ref 3.5–5.2)
ALP SERPL-CCNC: 114 U/L (ref 55–135)
ALT SERPL W/O P-5'-P-CCNC: 55 U/L (ref 10–44)
ANION GAP SERPL CALC-SCNC: 11 MMOL/L (ref 8–16)
AST SERPL-CCNC: 32 U/L (ref 10–40)
BASOPHILS # BLD AUTO: 0.03 K/UL (ref 0–0.2)
BASOPHILS NFR BLD: 0.2 % (ref 0–1.9)
BILIRUB SERPL-MCNC: 0.8 MG/DL (ref 0.1–1)
BUN SERPL-MCNC: 11 MG/DL (ref 8–23)
CALCIUM SERPL-MCNC: 9.5 MG/DL (ref 8.7–10.5)
CHLORIDE SERPL-SCNC: 98 MMOL/L (ref 95–110)
CO2 SERPL-SCNC: 20 MMOL/L (ref 23–29)
CREAT SERPL-MCNC: 0.7 MG/DL (ref 0.5–1.4)
DIFFERENTIAL METHOD: ABNORMAL
EOSINOPHIL # BLD AUTO: 0.3 K/UL (ref 0–0.5)
EOSINOPHIL NFR BLD: 2.2 % (ref 0–8)
ERYTHROCYTE [DISTWIDTH] IN BLOOD BY AUTOMATED COUNT: 14 % (ref 11.5–14.5)
EST. GFR  (AFRICAN AMERICAN): >60 ML/MIN/1.73 M^2
EST. GFR  (NON AFRICAN AMERICAN): >60 ML/MIN/1.73 M^2
GLUCOSE SERPL-MCNC: 95 MG/DL (ref 70–110)
HCT VFR BLD AUTO: 46.8 % (ref 40–54)
HGB BLD-MCNC: 15.6 G/DL (ref 14–18)
IMM GRANULOCYTES # BLD AUTO: 0.08 K/UL (ref 0–0.04)
IMM GRANULOCYTES NFR BLD AUTO: 0.6 % (ref 0–0.5)
LIPASE SERPL-CCNC: 19 U/L (ref 4–60)
LYMPHOCYTES # BLD AUTO: 1.2 K/UL (ref 1–4.8)
LYMPHOCYTES NFR BLD: 9.6 % (ref 18–48)
MCH RBC QN AUTO: 30.2 PG (ref 27–31)
MCHC RBC AUTO-ENTMCNC: 33.3 G/DL (ref 32–36)
MCV RBC AUTO: 91 FL (ref 82–98)
MONOCYTES # BLD AUTO: 1.1 K/UL (ref 0.3–1)
MONOCYTES NFR BLD: 8.8 % (ref 4–15)
NEUTROPHILS # BLD AUTO: 9.8 K/UL (ref 1.8–7.7)
NEUTROPHILS NFR BLD: 78.6 % (ref 38–73)
NRBC BLD-RTO: 0 /100 WBC
PLATELET # BLD AUTO: 265 K/UL (ref 150–450)
PMV BLD AUTO: 10.7 FL (ref 9.2–12.9)
POTASSIUM SERPL-SCNC: 4 MMOL/L (ref 3.5–5.1)
PROT SERPL-MCNC: 7 G/DL (ref 6–8.4)
RBC # BLD AUTO: 5.17 M/UL (ref 4.6–6.2)
SODIUM SERPL-SCNC: 129 MMOL/L (ref 136–145)
TROPONIN I SERPL DL<=0.01 NG/ML-MCNC: <0.006 NG/ML (ref 0–0.03)
WBC # BLD AUTO: 12.41 K/UL (ref 3.9–12.7)

## 2021-12-27 PROCEDURE — 93010 EKG 12-LEAD: ICD-10-PCS | Mod: ,,, | Performed by: INTERNAL MEDICINE

## 2021-12-27 PROCEDURE — 93010 ELECTROCARDIOGRAM REPORT: CPT | Mod: ,,, | Performed by: INTERNAL MEDICINE

## 2021-12-27 PROCEDURE — 84484 ASSAY OF TROPONIN QUANT: CPT | Performed by: EMERGENCY MEDICINE

## 2021-12-27 PROCEDURE — 93005 ELECTROCARDIOGRAM TRACING: CPT

## 2021-12-27 PROCEDURE — 99284 PR EMERGENCY DEPT VISIT,LEVEL IV: ICD-10-PCS | Mod: CS,,, | Performed by: EMERGENCY MEDICINE

## 2021-12-27 PROCEDURE — 25000003 PHARM REV CODE 250: Performed by: EMERGENCY MEDICINE

## 2021-12-27 PROCEDURE — 83690 ASSAY OF LIPASE: CPT | Performed by: EMERGENCY MEDICINE

## 2021-12-27 PROCEDURE — U0002 COVID-19 LAB TEST NON-CDC: HCPCS | Performed by: EMERGENCY MEDICINE

## 2021-12-27 PROCEDURE — 99285 EMERGENCY DEPT VISIT HI MDM: CPT | Mod: 25

## 2021-12-27 PROCEDURE — 80053 COMPREHEN METABOLIC PANEL: CPT | Performed by: EMERGENCY MEDICINE

## 2021-12-27 PROCEDURE — 99284 EMERGENCY DEPT VISIT MOD MDM: CPT | Mod: CS,,, | Performed by: EMERGENCY MEDICINE

## 2021-12-27 PROCEDURE — 85025 COMPLETE CBC W/AUTO DIFF WBC: CPT | Performed by: EMERGENCY MEDICINE

## 2021-12-27 RX ORDER — LIDOCAINE HYDROCHLORIDE 20 MG/ML
5 SOLUTION OROPHARYNGEAL
Status: COMPLETED | OUTPATIENT
Start: 2021-12-27 | End: 2021-12-27

## 2021-12-27 RX ORDER — PANTOPRAZOLE SODIUM 40 MG/1
40 TABLET, DELAYED RELEASE ORAL DAILY
Qty: 90 TABLET | Refills: 3 | Status: SHIPPED | OUTPATIENT
Start: 2021-12-27 | End: 2023-05-01

## 2021-12-27 RX ORDER — MAG HYDROX/ALUMINUM HYD/SIMETH 200-200-20
5 SUSPENSION, ORAL (FINAL DOSE FORM) ORAL
Status: COMPLETED | OUTPATIENT
Start: 2021-12-27 | End: 2021-12-27

## 2021-12-27 RX ADMIN — LIDOCAINE HYDROCHLORIDE 5 ML: 20 SOLUTION ORAL; TOPICAL at 10:12

## 2021-12-27 RX ADMIN — ALUMINUM HYDROXIDE, MAGNESIUM HYDROXIDE, AND SIMETHICONE 5 ML: 200; 200; 20 SUSPENSION ORAL at 10:12

## 2021-12-28 VITALS
RESPIRATION RATE: 16 BRPM | WEIGHT: 181 LBS | OXYGEN SATURATION: 96 % | SYSTOLIC BLOOD PRESSURE: 114 MMHG | BODY MASS INDEX: 27.52 KG/M2 | TEMPERATURE: 98 F | DIASTOLIC BLOOD PRESSURE: 65 MMHG | HEART RATE: 80 BPM

## 2021-12-28 LAB
CTP QC/QA: YES
SARS-COV-2 RDRP RESP QL NAA+PROBE: NEGATIVE

## 2021-12-28 NOTE — PLAN OF CARE
MS (multiple sclerosis) with right sided weakness    The above stated patient condition is supported in the patient's medical record and demonstrates the need for ambulance transportation. Ambulance service is hereby requested.  12/28/2021 12:16 PM

## 2021-12-28 NOTE — ED NOTES
Pt resting quietly in bed with eyes closed. Head of bed elevated for pts comfort. Glasses on. Pt denies complaints at present. Awaiting transport. Pt verbalizes understanding.

## 2021-12-28 NOTE — ED NOTES
Assumed care of pt bed AHALL2. Pt on 2L oxygen nasal cannula. Pt reporting cough and burning to throat. Pt denies abdominal pain at this time. Awaiting MD orders/disposition. Bed rails up x 2 with bed locked in lowest position. Pt verbalizes understanding of plan of care.

## 2021-12-28 NOTE — PROVIDER PROGRESS NOTES - EMERGENCY DEPT.
Encounter Date: 12/27/2021    ED Physician Progress Notes        Physician Note:   Assumed care of patient from Dr. Barton at 2300 pending labs, CXR.    No acute chest x-ray findings on my independent interpretation.  I reviewed the patient's labs with him at the bedside, notable for hyponatremia.  The patient is not altered, plans to follow-up with primary care doctor for close monitoring and repeat lab testing, I advised this should occur this week.  Patient feels improved.  All questions answered prior to discharge.  Return precautions given.  Patient understands and agrees with the plan.

## 2021-12-28 NOTE — ED PROVIDER NOTES
Encounter Date: 12/27/2021       History     Chief Complaint   Patient presents with    Abdominal Pain     Pt c/o burning epigastric pain that goes up his throat. Hx of stroke and MS      Michael German is a 66-year-old male history of GERD, hypertension, MS, neurogenic bladder presenting today with reflux.  Patient states he has had a burning sensation in the center of his chest and radiates up the throat since last night.  Denies any dietary changes.  Patient states this feels similar to his previous episodes of reflux.  He has been off of his Protonix for some time now.  He did try to take Tums yesterday without improvement of symptoms.  He became concerned when the pain and not go away after treatment then started radiating to the left upper abdomen.  He denies nausea or vomiting.  No fevers or chills.        Review of patient's allergies indicates:  No Known Allergies  Past Medical History:   Diagnosis Date    Acute urinary retention     Acute UTI     treated twice in past three weeks with antibx per patient-since indwelling catheter    Back problem     herniated disc-lumbar region    GERD (gastroesophageal reflux disease)     Hypertension     130s/80s    MS (multiple sclerosis)     Myocardial infarction     6/2020, has 1 stent    Neck stiffness     Neurogenic bladder     has indwelling catheter now    Sleep disturbance     Snoring     Visual impairment      Past Surgical History:   Procedure Laterality Date    COLONOSCOPY N/A 1/31/2019    Procedure: COLONOSCOPY;  Surgeon: Luis James MD;  [facility];  Service: Endoscopy;  Laterality: N/A;    COLONOSCOPY N/A 5/2/2021    Procedure: COLONOSCOPY;  Surgeon: Nancy Flores MD;  Location: [facility] ENDO;  Service: Endoscopy;  Laterality: N/A;    CORONARY ANGIOPLASTY WITH STENT PLACEMENT  06/24/2019    proximal LAD     FINGER SURGERY      LASER OF PROSTATE W/ GREEN LIGHT PVP      RECTAL BOTOX INJECTION      SPINAL FUSION       Family  History   Problem Relation Age of Onset    Dementia Mother     Parkinsonism Father     Heart block Father     Emphysema Father     No Known Problems Sister     Hypertension Brother     Anesthesia problems Neg Hx      Social History     Tobacco Use    Smoking status: Never Smoker    Smokeless tobacco: Never Used   Substance Use Topics    Alcohol use: Yes     Alcohol/week: 1.0 standard drink     Types: 1 Cans of beer per week     Comment: occasionally drinks    Drug use: No     Review of Systems   Constitutional: Negative for fever.   HENT: Negative for congestion and sore throat.    Respiratory: Negative for cough and shortness of breath.    Cardiovascular: Positive for chest pain. Negative for palpitations.   Gastrointestinal: Positive for abdominal pain. Negative for diarrhea, nausea and vomiting.        Dyspepsia   Genitourinary: Negative for dysuria.   Musculoskeletal: Negative for back pain.   Skin: Negative for rash.   Neurological: Positive for weakness (Baseline right-sided weakness from MS).   Hematological: Does not bruise/bleed easily.       Physical Exam     Initial Vitals   BP Pulse Resp Temp SpO2   12/27/21 2021 12/27/21 2021 12/27/21 2021 12/27/21 2212 12/27/21 2220   139/87 90 18 97.7 °F (36.5 °C) (!) 92 %      MAP       --                Physical Exam    Nursing note and vitals reviewed.  Constitutional: He appears well-developed and well-nourished. No distress.   HENT:   Mouth/Throat: Oropharynx is clear and moist.   Eyes: Conjunctivae are normal.   Neck: Neck supple.   Cardiovascular: Normal rate, regular rhythm and intact distal pulses.   Pulmonary/Chest: Breath sounds normal. He has no wheezes. He has no rales.   Abdominal: Abdomen is soft. He exhibits no distension. There is no abdominal tenderness. There is no rebound and no guarding.   Musculoskeletal:         General: No edema.      Cervical back: Neck supple.     Lymphadenopathy:     He has no cervical adenopathy.   Neurological:  He is alert and oriented to person, place, and time.   (+) right sided weakness- chronic     Skin: Skin is warm. Capillary refill takes less than 2 seconds. No rash noted.   Psychiatric: He has a normal mood and affect.         ED Course   Procedures  Labs Reviewed   CBC W/ AUTO DIFFERENTIAL - Abnormal; Notable for the following components:       Result Value    Immature Granulocytes 0.6 (*)     Gran # (ANC) 9.8 (*)     Immature Grans (Abs) 0.08 (*)     Mono # 1.1 (*)     Gran % 78.6 (*)     Lymph % 9.6 (*)     All other components within normal limits   COMPREHENSIVE METABOLIC PANEL - Abnormal; Notable for the following components:    Sodium 129 (*)     CO2 20 (*)     ALT 55 (*)     All other components within normal limits   TROPONIN I   LIPASE   SARS-COV-2 RDRP GENE    Narrative:     This test utilizes isothermal nucleic acid amplification   technology to detect the SARS-CoV-2 RdRp nucleic acid segment.   The analytical sensitivity (limit of detection) is 125 genome   equivalents/mL.   A POSITIVE result implies infection with the SARS-CoV-2 virus;   the patient is presumed to be contagious.     A NEGATIVE result means that SARS-CoV-2 nucleic acids are not   present above the limit of detection. A NEGATIVE result should be   treated as presumptive. It does not rule out the possibility of   COVID-19 and should not be the sole basis for treatment decisions.   If COVID-19 is strongly suspected based on clinical and exposure   history, re-testing using an alternate molecular assay should be   considered.   This test is only for use under the Food and Drug   Administration s Emergency Use Authorization (EUA).   Commercial kits are provided by [employer].   Performance characteristics of the EUA have been independently   verified by [facility] Department of   Pathology and Laboratory Medicine.   _________________________________________________________________   The authorized Fact Sheet for  Healthcare Providers and the authorized Fact   Sheet for Patients of the ID NOW COVID-19 are available on the FDA   website:     https://www.fda.gov/media/491061/download  https://www.fda.gov/media/804915/download         EKG Readings: (Independently Interpreted)   EKG:  Sinus rhythm at 91, normal intervals, normal axis, no ischemic changes     ECG Results          EKG 12-lead (In process)  Result time 12/28/21 08:36:59    In process by Interface, Lab In [facility] (12/28/21 08:36:59)                 Narrative:    Test Reason : R10.13,    Vent. Rate : 091 BPM     Atrial Rate : 091 BPM     P-R Int : 146 ms          QRS Dur : 082 ms      QT Int : 358 ms       P-R-T Axes : 060 073 059 degrees     QTc Int : 440 ms    Normal sinus rhythm  Possible Lateral infarct ,age undetermined  Abnormal ECG  When compared with ECG of 24-MAR-2014 11:34,  Borderline criteria for Lateral infarct are now Present  T wave amplitude has decreased in Lateral leads    Referred By: [email or web address]   SELF           Confirmed By:                             Imaging Results          X-Ray Chest AP Portable (Final result)  Result time 12/28/21 00:01:07    Final result by Harjeet Santiago MD (12/28/21 00:01:07)                 Impression:      Elevation of the left hemidiaphragm appears stable, overall intrathoracic appearance is stable without evidence for superimposed acute intrathoracic process.      Electronically signed by: Harjeet Santiago  Date:    12/28/2021  Time:    00:01             Narrative:    EXAMINATION:  XR CHEST AP PORTABLE    CLINICAL HISTORY:  Chest pain, unspecified    TECHNIQUE:  Single frontal view of the chest was performed.    COMPARISON:  Chest radiograph April 30, 2021    FINDINGS:  Single portable chest view is submitted.  There is elevation of the left hemidiaphragm appearing similar to the prior examination.  The patient is rotated, when accounting for difference in position and technique the cardiomediastinal silhouette  appears stable.  The lungs demonstrate atelectatic appearing change.  There is no evidence for confluent infiltrate or consolidation, significant pleural effusion or pneumothorax.  The visualized osseous structures appear intact.                                 Medications   aluminum-magnesium hydroxide-simethicone 200-200-20 mg/5 mL suspension 5 mL (5 mLs Oral Given 12/27/21 4295)   LIDOcaine HCl 2% oral solution 5 mL (5 mLs Oral Given 12/27/21 6500)     Medical Decision Making:   History:   Old Medical Records: I decided to obtain old medical records.  Initial Assessment:   Emergent evaluation of 66-year-old male presenting today with burning sensation in the chest that radiates to the throat.  History reflux.  Vital signs stable.  Patient is well-appearing, no acute distress.  Abdomen soft, nontender.  Differential Diagnosis:   Reflux, gastroenteritis, pancreatitis, gastritis, peptic ulcer disease, low suspicion for ACS  Independently Interpreted Test(s):   I have ordered and independently interpreted X-rays - see prior notes.  I have ordered and independently interpreted EKG Reading(s) - see prior notes  Clinical Tests:   Lab Tests: Reviewed and Ordered  Radiological Study: Ordered and Reviewed  Medical Tests: Ordered and Reviewed  ED Management:  - labs  - CXR  - EKG  - maalox, lidocaine    Patient re-evaluated after treatment, reports improvement of symptoms.  Patient turned over to Dr. Mendez in stable condition pending x-ray, COVID test, re-evaluation and final disposition                      Clinical Impression:   Final diagnoses:  [R10.13] Epigastric pain  [R07.9] Chest pain  [K21.9] Gastroesophageal reflux disease, unspecified whether esophagitis present (Primary)  [E87.1] Hyponatremia          ED Disposition Condition    Discharge Stable        ED Prescriptions     Medication Sig Dispense Start Date End Date Auth. Provider    pantoprazole (PROTONIX) 40 MG tablet Take 1 tablet (40 mg total) by mouth once  daily. 90 tablet 12/27/2021  Sherri Barton MD        Follow-up Information     Follow up With Specialties Details Why Contact Info    Rebecca Bejarano NP Family Medicine Schedule an appointment as soon as possible for a visit   [address] [facility]  [address] [place] [id number]  [phone]             Sherri Barton MD  12/28/21 1120

## 2021-12-28 NOTE — ED NOTES
Pt sitting up in bed with glasses on. Pt on 2 L nasal cannula oxygen. Pt remains waiting for transport back to [facility]. Respirations even and unlabored. Pt denies complaints at present. Pt verbalizes understanding of plan of care.

## 2021-12-28 NOTE — DISCHARGE INSTRUCTIONS
- restart protonix  - labs reassuring including cardiac workup.  Sodium is slightly low- may increase salt in diet for next couple of days  - return to ED for worsening sx.

## 2021-12-30 ENCOUNTER — TELEPHONE (OUTPATIENT)
Dept: PRIMARY CARE CLINIC | Facility: CLINIC | Age: 66
End: 2021-12-30
Payer: MEDICARE

## 2022-01-03 ENCOUNTER — LAB VISIT (OUTPATIENT)
Dept: LAB | Facility: OTHER | Age: 67
End: 2022-01-03
Payer: MEDICARE

## 2022-01-03 DIAGNOSIS — Z20.822 ENCOUNTER FOR LABORATORY TESTING FOR COVID-19 VIRUS: ICD-10-CM

## 2022-01-03 PROCEDURE — U0003 INFECTIOUS AGENT DETECTION BY NUCLEIC ACID (DNA OR RNA); SEVERE ACUTE RESPIRATORY SYNDROME CORONAVIRUS 2 (SARS-COV-2) (CORONAVIRUS DISEASE [COVID-19]), AMPLIFIED PROBE TECHNIQUE, MAKING USE OF HIGH THROUGHPUT TECHNOLOGIES AS DESCRIBED BY CMS-2020-01-R: HCPCS | Performed by: NURSE PRACTITIONER

## 2022-01-06 LAB — SARS-COV-2 RNA RESP QL NAA+PROBE: NOT DETECTED

## 2022-01-10 ENCOUNTER — LAB VISIT (OUTPATIENT)
Dept: LAB | Facility: OTHER | Age: 67
End: 2022-01-10
Payer: MEDICARE

## 2022-01-10 DIAGNOSIS — Z20.822 ENCOUNTER FOR LABORATORY TESTING FOR COVID-19 VIRUS: ICD-10-CM

## 2022-01-10 PROCEDURE — U0003 INFECTIOUS AGENT DETECTION BY NUCLEIC ACID (DNA OR RNA); SEVERE ACUTE RESPIRATORY SYNDROME CORONAVIRUS 2 (SARS-COV-2) (CORONAVIRUS DISEASE [COVID-19]), AMPLIFIED PROBE TECHNIQUE, MAKING USE OF HIGH THROUGHPUT TECHNOLOGIES AS DESCRIBED BY CMS-2020-01-R: HCPCS | Performed by: NURSE PRACTITIONER

## 2022-01-11 ENCOUNTER — TELEPHONE (OUTPATIENT)
Dept: INTERNAL MEDICINE | Facility: CLINIC | Age: 67
End: 2022-01-11
Payer: MEDICARE

## 2022-01-11 LAB
SARS-COV-2 RNA RESP QL NAA+PROBE: NOT DETECTED
SARS-COV-2- CYCLE NUMBER: NORMAL

## 2022-01-11 NOTE — TELEPHONE ENCOUNTER
----- Message from Shanique Romero DNP sent at 2022 11:36 AM CST -----  Regardin appt  This pt is scheduled to establish care and reflux. One there is a message indicating they are a nursing home pt, if they are they have to be seen by the medical director at the nursing home for this as they assume care of the residents. The other issue this pt has a pending COVID test from yesterday. We do not have results and if they are symptomatic they will need to stay home. Maybe change to virtual, but usually if in a nursing home the medical director assumes their care so they should not be seeing me to establish anyway. Please phone pt and or family member and inform them prior to them bringing him in.    Shanique Romero DNP, FNP-C

## 2022-01-24 ENCOUNTER — LAB VISIT (OUTPATIENT)
Dept: LAB | Facility: OTHER | Age: 67
End: 2022-01-24
Payer: MEDICARE

## 2022-01-24 DIAGNOSIS — Z20.822 ENCOUNTER FOR LABORATORY TESTING FOR COVID-19 VIRUS: ICD-10-CM

## 2022-01-24 PROCEDURE — U0003 INFECTIOUS AGENT DETECTION BY NUCLEIC ACID (DNA OR RNA); SEVERE ACUTE RESPIRATORY SYNDROME CORONAVIRUS 2 (SARS-COV-2) (CORONAVIRUS DISEASE [COVID-19]), AMPLIFIED PROBE TECHNIQUE, MAKING USE OF HIGH THROUGHPUT TECHNOLOGIES AS DESCRIBED BY CMS-2020-01-R: HCPCS | Performed by: EMERGENCY MEDICINE

## 2022-01-25 LAB
SARS-COV-2 RNA RESP QL NAA+PROBE: NOT DETECTED
SARS-COV-2- CYCLE NUMBER: NORMAL

## 2022-01-26 ENCOUNTER — TELEPHONE (OUTPATIENT)
Dept: NEUROLOGY | Facility: CLINIC | Age: 67
End: 2022-01-26
Payer: MEDICARE

## 2022-01-26 NOTE — TELEPHONE ENCOUNTER
----- Message from Finn Chau sent at 1/26/2022 11:27 AM CST -----  Contact: Bindu ( spouse ) @ [phone]  Caller is calling to schedule a follow-up appointment pls call

## 2022-01-28 NOTE — TELEPHONE ENCOUNTER
----- Message from Taylor Wallace sent at 1/28/2022 11:29 AM CST -----  .Type:  Patient Call Back    Who Called: pt wife       Does the patient know what this is regarding?: pt needs to schedule appointment pt has left a message about it one has called pt as of yet     Would the patient rather a call back yes     Best Call Back Number: [phone]    Additional Information: Thank You

## 2022-01-31 ENCOUNTER — LAB VISIT (OUTPATIENT)
Dept: LAB | Facility: OTHER | Age: 67
End: 2022-01-31
Payer: MEDICARE

## 2022-01-31 DIAGNOSIS — Z20.822 ENCOUNTER FOR LABORATORY TESTING FOR COVID-19 VIRUS: ICD-10-CM

## 2022-01-31 PROCEDURE — U0003 INFECTIOUS AGENT DETECTION BY NUCLEIC ACID (DNA OR RNA); SEVERE ACUTE RESPIRATORY SYNDROME CORONAVIRUS 2 (SARS-COV-2) (CORONAVIRUS DISEASE [COVID-19]), AMPLIFIED PROBE TECHNIQUE, MAKING USE OF HIGH THROUGHPUT TECHNOLOGIES AS DESCRIBED BY CMS-2020-01-R: HCPCS | Performed by: EMERGENCY MEDICINE

## 2022-02-01 LAB
SARS-COV-2 RNA RESP QL NAA+PROBE: NOT DETECTED
SARS-COV-2- CYCLE NUMBER: NORMAL

## 2022-02-07 ENCOUNTER — LAB VISIT (OUTPATIENT)
Dept: LAB | Facility: OTHER | Age: 67
End: 2022-02-07
Payer: MEDICARE

## 2022-02-07 DIAGNOSIS — Z20.822 ENCOUNTER FOR LABORATORY TESTING FOR COVID-19 VIRUS: ICD-10-CM

## 2022-02-07 PROCEDURE — U0003 INFECTIOUS AGENT DETECTION BY NUCLEIC ACID (DNA OR RNA); SEVERE ACUTE RESPIRATORY SYNDROME CORONAVIRUS 2 (SARS-COV-2) (CORONAVIRUS DISEASE [COVID-19]), AMPLIFIED PROBE TECHNIQUE, MAKING USE OF HIGH THROUGHPUT TECHNOLOGIES AS DESCRIBED BY CMS-2020-01-R: HCPCS | Performed by: EMERGENCY MEDICINE

## 2022-02-08 LAB
SARS-COV-2 RNA RESP QL NAA+PROBE: NOT DETECTED
SARS-COV-2- CYCLE NUMBER: NORMAL

## 2022-02-14 ENCOUNTER — LAB VISIT (OUTPATIENT)
Dept: LAB | Facility: OTHER | Age: 67
End: 2022-02-14
Payer: MEDICARE

## 2022-02-14 DIAGNOSIS — Z20.822 ENCOUNTER FOR LABORATORY TESTING FOR COVID-19 VIRUS: ICD-10-CM

## 2022-02-14 LAB
SARS-COV-2 RNA RESP QL NAA+PROBE: NOT DETECTED
SARS-COV-2- CYCLE NUMBER: NORMAL

## 2022-02-14 PROCEDURE — U0003 INFECTIOUS AGENT DETECTION BY NUCLEIC ACID (DNA OR RNA); SEVERE ACUTE RESPIRATORY SYNDROME CORONAVIRUS 2 (SARS-COV-2) (CORONAVIRUS DISEASE [COVID-19]), AMPLIFIED PROBE TECHNIQUE, MAKING USE OF HIGH THROUGHPUT TECHNOLOGIES AS DESCRIBED BY CMS-2020-01-R: HCPCS | Performed by: EMERGENCY MEDICINE

## 2022-02-21 ENCOUNTER — LAB VISIT (OUTPATIENT)
Dept: LAB | Facility: OTHER | Age: 67
End: 2022-02-21
Payer: MEDICARE

## 2022-02-21 DIAGNOSIS — Z20.822 ENCOUNTER FOR LABORATORY TESTING FOR COVID-19 VIRUS: ICD-10-CM

## 2022-02-21 PROCEDURE — U0003 INFECTIOUS AGENT DETECTION BY NUCLEIC ACID (DNA OR RNA); SEVERE ACUTE RESPIRATORY SYNDROME CORONAVIRUS 2 (SARS-COV-2) (CORONAVIRUS DISEASE [COVID-19]), AMPLIFIED PROBE TECHNIQUE, MAKING USE OF HIGH THROUGHPUT TECHNOLOGIES AS DESCRIBED BY CMS-2020-01-R: HCPCS | Performed by: EMERGENCY MEDICINE

## 2022-02-22 LAB
SARS-COV-2 RNA RESP QL NAA+PROBE: NOT DETECTED
SARS-COV-2- CYCLE NUMBER: NORMAL

## 2022-02-25 ENCOUNTER — EXTERNAL HOSPITAL ADMISSION (OUTPATIENT)
Dept: SKILLED NURSING FACILITY | Facility: HOSPITAL | Age: 67
End: 2022-02-25
Payer: MEDICARE

## 2022-02-25 DIAGNOSIS — G35 MULTIPLE SCLEROSIS: Primary | ICD-10-CM

## 2022-02-25 PROCEDURE — 99308 PR NURSING FAC CARE, SUBSEQ, MINOR COMPLIC: ICD-10-PCS | Mod: ,,, | Performed by: INTERNAL MEDICINE

## 2022-02-25 PROCEDURE — 99308 SBSQ NF CARE LOW MDM 20: CPT | Mod: ,,, | Performed by: INTERNAL MEDICINE

## 2022-02-25 NOTE — PROGRESS NOTES
[facility] Nursing [facility]   New Visit Progress Note   Recent Hospital Discharge     PRESENTING HISTORY     Chief Complaint/Reason for Admission:  Follow up Hospital Discharge   PCP: Rebecca Bejarano NP (Inactive)    History of Present Illness:  Mr. Michael German is a 66 y.o. male who was recently admitted to the hospital.            ___________________________________________________________________    Today: Recently seen in ER for Chest pain, doing well today, with no Compaints        Review of Systems  General ROS: negative for chills, fever or weight loss  Psychological ROS: negative for hallucination, depression or suicidal ideation  Ophthalmic ROS: negative for blurry vision, photophobia or eye pain  ENT ROS: negative for epistaxis, sore throat or rhinorrhea  Respiratory ROS: no cough, shortness of breath, or wheezing  Cardiovascular ROS: no chest pain or dyspnea on exertion  Gastrointestinal ROS: no abdominal pain, change in bowel habits, or black/ bloody stools  Genito-Urinary ROS: no dysuria, trouble voiding, or hematuria  Musculoskeletal ROS: negative for gait disturbance or muscular weakness  Neurological ROS: no syncope or seizures; no ataxia  Dermatological ROS: negative for pruritis, rash and jaundice          PAST HISTORY:     Past Medical History:   Diagnosis Date    Acute urinary retention     Acute UTI     treated twice in past three weeks with antibx per patient-since indwelling catheter    Back problem     herniated disc-lumbar region    GERD (gastroesophageal reflux disease)     Hypertension     130s/80s    MS (multiple sclerosis)     Myocardial infarction     6/2020, has 1 stent    Neck stiffness     Neurogenic bladder     has indwelling catheter now    Sleep disturbance     Snoring     Visual impairment        Past Surgical History:   Procedure Laterality Date    COLONOSCOPY N/A 1/31/2019    Procedure: COLONOSCOPY;  Surgeon: Luis James MD;  [facility]: [facility]  ENDO;  Service: Endoscopy;  Laterality: N/A;    COLONOSCOPY N/A 5/2/2021    Procedure: COLONOSCOPY;  Surgeon: Nancy Flores MD;  Location: [facility] ENDO;  Service: Endoscopy;  Laterality: N/A;    CORONARY ANGIOPLASTY WITH STENT PLACEMENT  06/24/2019    proximal LAD     FINGER SURGERY      LASER OF PROSTATE W/ GREEN LIGHT PVP      RECTAL BOTOX INJECTION      SPINAL FUSION         Family History   Problem Relation Age of Onset    Dementia Mother     Parkinsonism Father     Heart block Father     Emphysema Father     No Known Problems Sister     Hypertension Brother     Anesthesia problems Neg Hx          MEDICATIONS & ALLERGIES:     Current Outpatient Medications on File Prior to Visit   Medication Sig Dispense Refill    ascorbic acid, vitamin C, (VITAMIN C) 1000 MG tablet Take 1,000 mg by mouth once daily.      atorvastatin (LIPITOR) 20 MG tablet Take 20 mg by mouth every evening.  5    b complex vitamins tablet Take 1 tablet by mouth once daily.      baclofen (LIORESAL) 10 MG tablet Take 10 mg by mouth once daily.      BRILINTA 90 mg tablet Take 1 tablet (90 mg total) by mouth every 12 (twelve) hours. 180 tablet 3    calcium carbonate (TUMS) 200 mg calcium (500 mg) chewable tablet Take 2 tablets by mouth 3 (three) times daily as needed for Heartburn.      carvediloL (COREG) 6.25 MG tablet Take 1 tablet (6.25 mg total) by mouth 2 (two) times daily. 180 tablet 3    gabapentin (NEURONTIN) 100 MG capsule Take 1 capsule (100 mg total) by mouth 3 (three) times daily. 90 capsule 11    lactulose (CEPHULAC) 20 gram Pack Take 1 packet (20 g total) by mouth 3 (three) times daily as needed (constipation). 15 packet 0    losartan (COZAAR) 100 MG tablet Take 1 tablet (100 mg total) by mouth once daily. 90 tablet 3    nitroGLYCERIN (NITROSTAT) 0.4 MG SL tablet Place 1 tablet (0.4 mg total) under the tongue every 5 (five) minutes as needed for Chest pain. DO NOT EXCEED A TOTAL OF 3 DOSES IN 15 MINUTES 25  tablet 5    omeprazole (PRILOSEC) 20 MG capsule Take 20 mg by mouth once daily.      pantoprazole (PROTONIX) 40 MG tablet Take 1 tablet (40 mg total) by mouth once daily. 90 tablet 3    pyridoxine, vitamin B6, (VITAMIN B-6) 100 MG Tab Take 100 mg by mouth once daily.       No current facility-administered medications on file prior to visit.        Review of patient's allergies indicates:  No Known Allergies    OBJECTIVE:     Vital Signs:  There were no vitals taken for this visit.  Wt Readings from Last 1 Encounters:   12/27/21 2220 82.1 kg (181 lb)     There is no height or weight on file to calculate BMI.        Physical Exam:  There were no vitals taken for this visit.  General appearance: alert, cooperative, no distress  Constitutional:Oriented to person, place, and time  + appears well-developed and well-nourished.   HEENT: Normocephalic, atraumatic, neck symmetrical, no nasal discharge   Eyes: conjunctivae/corneas clear, PERRL, EOM's intact  Lungs: clear to auscultation bilaterally, no dullness to percussion bilaterally  Heart: regular rate and rhythm without rub; no displacement of the PMI   Abdomen: soft, non-tender; bowel sounds normoactive; no organomegaly  Extremities: extremities symmetric; no clubbing, cyanosis, or edema  Integument: Skin color, texture, turgor normal; no rashes; hair distrubution normal  Neurologic: Alert and oriented X 3, normal strength, normal coordination and gait  Psychiatric: no pressured speech; normal affect; no evidence of impaired cognition     Laboratory  Lab Results   Component Value Date    WBC 12.41 12/27/2021    HGB 15.6 12/27/2021    HCT 46.8 12/27/2021    MCV 91 12/27/2021     12/27/2021     BMP  Lab Results   Component Value Date     (L) 12/27/2021    K 4.0 12/27/2021    CL 98 12/27/2021    CO2 20 (L) 12/27/2021    BUN 11 12/27/2021    CREATININE 0.7 12/27/2021    CALCIUM 9.5 12/27/2021    ANIONGAP 11 12/27/2021    ESTGFRAFRICA >60.0 12/27/2021     EGFRNONAA >60.0 12/27/2021     Lab Results   Component Value Date    ALT 55 (H) 12/27/2021    AST 32 12/27/2021    ALKPHOS 114 12/27/2021    BILITOT 0.8 12/27/2021     Lab Results   Component Value Date    INR 1.0 08/01/2016     Lab Results   Component Value Date    HGBA1C 5.4 05/01/2021       Diagnostic Results:        TRANSITION OF CARE:     .     ASSESSMENT & PLAN:     HIGH RISK CONDITION(S):  *Colitis  - continue oral antibiotics  - gastro on board         Fecal impaction  - patient received multiple enemas and has moved his bowel  - patient had colonoscopy done 05/02/2021  - continue oral lactulose       Hypertension  - normotensive  - continue home medication of losartan 100 mg daily, Coreg 6.25 mg b.i.d.       Multiple sclerosis  - PT/OT            Scheduled Follow-up :  Future Appointments   Date Time Provider Department Center   2/28/2022  7:00 AM [facility] SPEC LAB [facility]   3/10/2022 10:30 AM JORDAN Feldman, CNS [facility]       Post Visit Medication List:     Medication List          Accurate as of February 25, 2022  2:00 PM. If you have any questions, ask your nurse or doctor.            CONTINUE taking these medications    atorvastatin 20 MG tablet  Commonly known as: LIPITOR     b complex vitamins tablet     baclofen 10 MG tablet  Commonly known as: LIORESAL     BRILINTA 90 mg tablet  Generic drug: ticagrelor  Take 1 tablet (90 mg total) by mouth every 12 (twelve) hours.     calcium carbonate 200 mg calcium (500 mg) chewable tablet  Commonly known as: TUMS     carvediloL 6.25 MG tablet  Commonly known as: COREG  Take 1 tablet (6.25 mg total) by mouth 2 (two) times daily.     gabapentin 100 MG capsule  Commonly known as: NEURONTIN  Take 1 capsule (100 mg total) by mouth 3 (three) times daily.     lactulose 20 gram Pack  Commonly known as: CEPHULAC  Take 1 packet (20 g total) by mouth 3 (three) times daily as needed (constipation).      losartan 100 MG tablet  Commonly known as: COZAAR  Take 1 tablet (100 mg total) by mouth once daily.     nitroGLYCERIN 0.4 MG SL tablet  Commonly known as: NITROSTAT  Place 1 tablet (0.4 mg total) under the tongue every 5 (five) minutes as needed for Chest pain. DO NOT EXCEED A TOTAL OF 3 DOSES IN 15 MINUTES     omeprazole 20 MG capsule  Commonly known as: PRILOSEC     pantoprazole 40 MG tablet  Commonly known as: PROTONIX  Take 1 tablet (40 mg total) by mouth once daily.     VITAMIN B-6 100 MG Tab  Generic drug: pyridoxine (vitamin B6)     VITAMIN C 1000 MG tablet  Generic drug: ascorbic acid (vitamin C)            Signing Physician:  Rufus Gaines MD

## 2022-02-28 ENCOUNTER — PATIENT MESSAGE (OUTPATIENT)
Dept: PSYCHIATRY | Facility: CLINIC | Age: 67
End: 2022-02-28
Payer: MEDICARE

## 2022-02-28 ENCOUNTER — LAB VISIT (OUTPATIENT)
Dept: LAB | Facility: OTHER | Age: 67
End: 2022-02-28
Payer: MEDICARE

## 2022-02-28 DIAGNOSIS — Z20.822 ENCOUNTER FOR LABORATORY TESTING FOR COVID-19 VIRUS: ICD-10-CM

## 2022-02-28 PROCEDURE — U0003 INFECTIOUS AGENT DETECTION BY NUCLEIC ACID (DNA OR RNA); SEVERE ACUTE RESPIRATORY SYNDROME CORONAVIRUS 2 (SARS-COV-2) (CORONAVIRUS DISEASE [COVID-19]), AMPLIFIED PROBE TECHNIQUE, MAKING USE OF HIGH THROUGHPUT TECHNOLOGIES AS DESCRIBED BY CMS-2020-01-R: HCPCS | Performed by: EMERGENCY MEDICINE

## 2022-03-01 LAB
SARS-COV-2 RNA RESP QL NAA+PROBE: NOT DETECTED
SARS-COV-2- CYCLE NUMBER: NORMAL

## 2022-03-07 ENCOUNTER — LAB VISIT (OUTPATIENT)
Dept: LAB | Facility: OTHER | Age: 67
End: 2022-03-07
Payer: MEDICARE

## 2022-03-07 DIAGNOSIS — Z20.822 ENCOUNTER FOR LABORATORY TESTING FOR COVID-19 VIRUS: ICD-10-CM

## 2022-03-07 PROCEDURE — U0003 INFECTIOUS AGENT DETECTION BY NUCLEIC ACID (DNA OR RNA); SEVERE ACUTE RESPIRATORY SYNDROME CORONAVIRUS 2 (SARS-COV-2) (CORONAVIRUS DISEASE [COVID-19]), AMPLIFIED PROBE TECHNIQUE, MAKING USE OF HIGH THROUGHPUT TECHNOLOGIES AS DESCRIBED BY CMS-2020-01-R: HCPCS | Performed by: EMERGENCY MEDICINE

## 2022-03-08 LAB
SARS-COV-2 RNA RESP QL NAA+PROBE: NOT DETECTED
SARS-COV-2- CYCLE NUMBER: NORMAL

## 2022-03-10 ENCOUNTER — OFFICE VISIT (OUTPATIENT)
Dept: NEUROLOGY | Facility: CLINIC | Age: 67
End: 2022-03-10
Payer: MEDICARE

## 2022-03-10 VITALS
BODY MASS INDEX: 27.52 KG/M2 | HEIGHT: 68 IN | HEART RATE: 76 BPM | DIASTOLIC BLOOD PRESSURE: 71 MMHG | SYSTOLIC BLOOD PRESSURE: 114 MMHG

## 2022-03-10 DIAGNOSIS — R25.2 SPASTICITY: ICD-10-CM

## 2022-03-10 DIAGNOSIS — Z78.9 IMPAIRED MOBILITY AND ADLS: ICD-10-CM

## 2022-03-10 DIAGNOSIS — G35 MULTIPLE SCLEROSIS: Primary | ICD-10-CM

## 2022-03-10 DIAGNOSIS — Z71.89 COUNSELING REGARDING GOALS OF CARE: ICD-10-CM

## 2022-03-10 DIAGNOSIS — Z74.09 IMPAIRED MOBILITY AND ADLS: ICD-10-CM

## 2022-03-10 DIAGNOSIS — N31.9 NEUROGENIC DYSFUNCTION OF THE URINARY BLADDER: ICD-10-CM

## 2022-03-10 PROCEDURE — 3008F PR BODY MASS INDEX (BMI) DOCUMENTED: ICD-10-PCS | Mod: CPTII,S$GLB,, | Performed by: CLINICAL NURSE SPECIALIST

## 2022-03-10 PROCEDURE — 3288F PR FALLS RISK ASSESSMENT DOCUMENTED: ICD-10-PCS | Mod: CPTII,S$GLB,, | Performed by: CLINICAL NURSE SPECIALIST

## 2022-03-10 PROCEDURE — 99999 PR PBB SHADOW E&M-EST. PATIENT-LVL III: CPT | Mod: PBBFAC,,, | Performed by: CLINICAL NURSE SPECIALIST

## 2022-03-10 PROCEDURE — 3074F SYST BP LT 130 MM HG: CPT | Mod: CPTII,S$GLB,, | Performed by: CLINICAL NURSE SPECIALIST

## 2022-03-10 PROCEDURE — 3074F PR MOST RECENT SYSTOLIC BLOOD PRESSURE < 130 MM HG: ICD-10-PCS | Mod: CPTII,S$GLB,, | Performed by: CLINICAL NURSE SPECIALIST

## 2022-03-10 PROCEDURE — 99999 PR PBB SHADOW E&M-EST. PATIENT-LVL III: ICD-10-PCS | Mod: PBBFAC,,, | Performed by: CLINICAL NURSE SPECIALIST

## 2022-03-10 PROCEDURE — 3008F BODY MASS INDEX DOCD: CPT | Mod: CPTII,S$GLB,, | Performed by: CLINICAL NURSE SPECIALIST

## 2022-03-10 PROCEDURE — 3288F FALL RISK ASSESSMENT DOCD: CPT | Mod: CPTII,S$GLB,, | Performed by: CLINICAL NURSE SPECIALIST

## 2022-03-10 PROCEDURE — 1101F PR PT FALLS ASSESS DOC 0-1 FALLS W/OUT INJ PAST YR: ICD-10-PCS | Mod: CPTII,S$GLB,, | Performed by: CLINICAL NURSE SPECIALIST

## 2022-03-10 PROCEDURE — 1159F MED LIST DOCD IN RCRD: CPT | Mod: CPTII,S$GLB,, | Performed by: CLINICAL NURSE SPECIALIST

## 2022-03-10 PROCEDURE — 99215 OFFICE O/P EST HI 40 MIN: CPT | Mod: S$GLB,,, | Performed by: CLINICAL NURSE SPECIALIST

## 2022-03-10 PROCEDURE — 4010F ACE/ARB THERAPY RXD/TAKEN: CPT | Mod: CPTII,S$GLB,, | Performed by: CLINICAL NURSE SPECIALIST

## 2022-03-10 PROCEDURE — 1101F PT FALLS ASSESS-DOCD LE1/YR: CPT | Mod: CPTII,S$GLB,, | Performed by: CLINICAL NURSE SPECIALIST

## 2022-03-10 PROCEDURE — 3078F DIAST BP <80 MM HG: CPT | Mod: CPTII,S$GLB,, | Performed by: CLINICAL NURSE SPECIALIST

## 2022-03-10 PROCEDURE — 3078F PR MOST RECENT DIASTOLIC BLOOD PRESSURE < 80 MM HG: ICD-10-PCS | Mod: CPTII,S$GLB,, | Performed by: CLINICAL NURSE SPECIALIST

## 2022-03-10 PROCEDURE — 4010F PR ACE/ARB THEARPY RXD/TAKEN: ICD-10-PCS | Mod: CPTII,S$GLB,, | Performed by: CLINICAL NURSE SPECIALIST

## 2022-03-10 PROCEDURE — 99215 PR OFFICE/OUTPT VISIT, EST, LEVL V, 40-54 MIN: ICD-10-PCS | Mod: S$GLB,,, | Performed by: CLINICAL NURSE SPECIALIST

## 2022-03-10 PROCEDURE — 1159F PR MEDICATION LIST DOCUMENTED IN MEDICAL RECORD: ICD-10-PCS | Mod: CPTII,S$GLB,, | Performed by: CLINICAL NURSE SPECIALIST

## 2022-03-10 RX ORDER — LACTULOSE 10 G/15ML
SOLUTION ORAL; RECTAL
COMMUNITY
Start: 2021-12-07 | End: 2023-05-01

## 2022-03-10 NOTE — Clinical Note
ELROY or UR, can one of your call [facility] in [place] to request an updated copy of the patient's medication list? He did not have this with him at our recent visit, so I'm not sure what meds he is taking.    [phone]

## 2022-03-10 NOTE — PROGRESS NOTES
"Subjective:          Patient ID: Michael German is a 66 y.o. male who presents today for a routine clinic visit for MS. He was last seen in April 2021. The history has been provided by the patient. He is accompanied by his wife.      MS HPI:  · DMT: None  · He has a chronic wound on the buttocks. He has very dry skin.     Since the last visit, he spent 7 months at [facility]. In late April 2021, he had food poisoning that caused significant diarrhea. It became difficult for his wife to care for him. He is now over at [facility] for the past 3 months. He states "this is a nightmare." He sometimes waits for many hours for his diaper to be changed. Visitors are still limited at the facility. Physical therapy is inconsistent. However, he is getting visits right now and has 14 visits planned.    He is getting bed baths because it is difficult to get into the shower.   He denies any new health problems.   He believes that Ocrevus made him worse.   He has a COVID test every Monday.   He was seen in the ER for GERD in December.   He has cramps in his arms at night that wake him up.   He tries to exercise in his bed.   He is transferred to his chair using a Randee lift.   He has taken baclofen, but it made his blood pressure elevate.   Dr. Gaines is the PCP in the nursing facility.       Medications:  Current Outpatient Medications   Medication Sig    ascorbic acid, vitamin C, (VITAMIN C) 1000 MG tablet Take 1,000 mg by mouth once daily.    atorvastatin (LIPITOR) 20 MG tablet Take 20 mg by mouth every evening.    b complex vitamins tablet Take 1 tablet by mouth once daily.    baclofen (LIORESAL) 10 MG tablet Take 10 mg by mouth once daily.    BRILINTA 90 mg tablet Take 1 tablet (90 mg total) by mouth every 12 (twelve) hours.    calcium carbonate (TUMS) 200 mg calcium (500 mg) chewable tablet Take 2 tablets by mouth 3 (three) times daily as needed for Heartburn.    carvediloL (COREG) 6.25 MG " tablet Take 1 tablet (6.25 mg total) by mouth 2 (two) times daily.    gabapentin (NEURONTIN) 100 MG capsule Take 1 capsule (100 mg total) by mouth 3 (three) times daily.    lactulose (CEPHULAC) 20 gram Pack Take 1 packet (20 g total) by mouth 3 (three) times daily as needed (constipation).    losartan (COZAAR) 100 MG tablet Take 1 tablet (100 mg total) by mouth once daily.    nitroGLYCERIN (NITROSTAT) 0.4 MG SL tablet Place 1 tablet (0.4 mg total) under the tongue every 5 (five) minutes as needed for Chest pain. DO NOT EXCEED A TOTAL OF 3 DOSES IN 15 MINUTES    omeprazole (PRILOSEC) 20 MG capsule Take 20 mg by mouth once daily.    pantoprazole (PROTONIX) 40 MG tablet Take 1 tablet (40 mg total) by mouth once daily.    pyridoxine, vitamin B6, (VITAMIN B-6) 100 MG Tab Take 100 mg by mouth once daily.     SOCIAL HISTORY  Social History     Tobacco Use    Smoking status: Never Smoker    Smokeless tobacco: Never Used   Substance Use Topics    Alcohol use: Yes     Alcohol/week: 1.0 standard drink     Types: 1 Cans of beer per week     Comment: occasionally drinks    Drug use: No       Living arrangements - the patient lives with their spouse.           Objective:        1. 25 foot timed walk: he is wheelchair bound     Neurologic Exam     He is in a power chair and is not able to walk; he is unable to transfer independently    MENTAL STATUS: Grossly intact.     CRANIAL NERVE EXAM: There is no internuclear ophthalmoplegia. No significant dysarthria. Tongue is midline. Hearing is intact.     MOTOR EXAM: Shows increased tone in the legs. He has a right hemiplegia, much worse than the left.     SENSORY EXAM: Shows decreased vibration sense in all four limbs, right greater than left. He can feel pressure of the tuning fork, but no vibration in right leg.     Imaging:       Results for orders placed during the hospital encounter of 03/03/20    MRI Brain Demyelinating W W/O Contrast    Impression  Significant motion  degradation.    White matter changes as described above, not significantly changed compared to the previous examination.    Involutional changes.      Electronically signed by: Yaneli Cantu IV., MD  Date:    03/03/2020  Time:    13:46          Labs:     Lab Results   Component Value Date    WQDXKJWC11ZR 25 (L) 04/13/2021    FZZNVPZT92UM 27 (L) 08/17/2020    QYTYZDKG79LY 29 (L) 02/20/2019     Lab Results   Component Value Date    JCVINDEX 0.50 (A) 05/11/2016    JCVANTIBODY Positive (A) 05/11/2016     Lab Results   Component Value Date    JA2DIKFP 65.6 05/11/2016    ABSOLUTECD3 476 (L) 05/11/2016    QO1GPSHH 21.2 05/11/2016    ABSOLUTECD8 154 (L) 05/11/2016    RW7XVWAE 44.2 05/11/2016    ABSOLUTECD4 321 05/11/2016    LABCD48 2.09 05/11/2016     Lab Results   Component Value Date    WBC 12.41 12/27/2021    RBC 5.17 12/27/2021    HGB 15.6 12/27/2021    HCT 46.8 12/27/2021    MCV 91 12/27/2021    MCH 30.2 12/27/2021    MCHC 33.3 12/27/2021    RDW 14.0 12/27/2021     12/27/2021    MPV 10.7 12/27/2021    GRAN 9.8 (H) 12/27/2021    GRAN 78.6 (H) 12/27/2021    LYMPH 1.2 12/27/2021    LYMPH 9.6 (L) 12/27/2021    MONO 1.1 (H) 12/27/2021    MONO 8.8 12/27/2021    EOS 0.3 12/27/2021    BASO 0.03 12/27/2021    EOSINOPHIL 2.2 12/27/2021    BASOPHIL 0.2 12/27/2021     Sodium   Date Value Ref Range Status   12/27/2021 129 (L) 136 - 145 mmol/L Final     Potassium   Date Value Ref Range Status   12/27/2021 4.0 3.5 - 5.1 mmol/L Final     Chloride   Date Value Ref Range Status   12/27/2021 98 95 - 110 mmol/L Final     CO2   Date Value Ref Range Status   12/27/2021 20 (L) 23 - 29 mmol/L Final     Glucose   Date Value Ref Range Status   12/27/2021 95 70 - 110 mg/dL Final     BUN   Date Value Ref Range Status   12/27/2021 11 8 - 23 mg/dL Final     Creatinine   Date Value Ref Range Status   12/27/2021 0.7 0.5 - 1.4 mg/dL Final   11/03/2012 0.9 0.5 - 1.4 mg/dL Final     Calcium   Date Value Ref Range Status   12/27/2021 9.5 8.7 - 10.5  mg/dL Final   11/03/2012 9.8 8.7 - 10.5 mg/dL Final     Total Protein   Date Value Ref Range Status   12/27/2021 7.0 6.0 - 8.4 g/dL Final     Albumin   Date Value Ref Range Status   12/27/2021 3.7 3.5 - 5.2 g/dL Final     Total Bilirubin   Date Value Ref Range Status   12/27/2021 0.8 0.1 - 1.0 mg/dL Final     Comment:     For infants and newborns, interpretation of results should be based  on gestational age, weight and in agreement with clinical  observations.    Premature Infant recommended reference ranges:  Up to 24 hours.............<8.0 mg/dL  Up to 48 hours............<12.0 mg/dL  3-5 days..................<15.0 mg/dL  6-29 days.................<15.0 mg/dL       Alkaline Phosphatase   Date Value Ref Range Status   12/27/2021 114 55 - 135 U/L Final     AST   Date Value Ref Range Status   12/27/2021 32 10 - 40 U/L Final     ALT   Date Value Ref Range Status   12/27/2021 55 (H) 10 - 44 U/L Final     Anion Gap   Date Value Ref Range Status   12/27/2021 11 8 - 16 mmol/L Final   11/03/2012 13 5 - 15 meq/L Final     eGFR if    Date Value Ref Range Status   12/27/2021 >60.0 >60 mL/min/1.73 m^2 Final     eGFR if non    Date Value Ref Range Status   12/27/2021 >60.0 >60 mL/min/1.73 m^2 Final     Comment:     Calculation used to obtain the estimated glomerular filtration  rate (eGFR) is the CKD-EPI equation.        Lab Results   Component Value Date    HEPBSAG Negative 08/17/2020    HEPBSAB Positive (A) 08/17/2020    HEPBCAB Negative 08/17/2020       MS Impression and Plan:     NEURO MULTIPLE SCLEROSIS IMPRESSION:   MS Status:     Clinical Progression comment:  He has advanced disability from MS.   Plan:     DMT:  No change in management     We will send a new order to [facility] for maintenance PT and OT.    He will follow up with Dr. Lyon in July. We will also get him set up with Dr. Delcid.       Total time spent with patient: 56 minutes   Total time spent on encounter: 70  minutes    Jami Deluca, APRN, CNS

## 2022-03-10 NOTE — Clinical Note
"Michael ALONSO is in [facility] in [place] and states it is a "nightmare." They are planning to explore some other options. Can you give them any suggestions? "

## 2022-03-11 ENCOUNTER — EXTERNAL HOSPITAL ADMISSION (OUTPATIENT)
Dept: SKILLED NURSING FACILITY | Facility: HOSPITAL | Age: 67
End: 2022-03-11
Payer: MEDICARE

## 2022-03-11 DIAGNOSIS — K59.04 CHRONIC IDIOPATHIC CONSTIPATION: Primary | ICD-10-CM

## 2022-03-11 PROCEDURE — 99308 PR NURSING FAC CARE, SUBSEQ, MINOR COMPLIC: ICD-10-PCS | Mod: CI,,, | Performed by: INTERNAL MEDICINE

## 2022-03-11 PROCEDURE — 99308 SBSQ NF CARE LOW MDM 20: CPT | Mod: CI,,, | Performed by: INTERNAL MEDICINE

## 2022-03-11 NOTE — PROGRESS NOTES
[facility]   New Visit Progress Note   Recent Hospital Discharge     PRESENTING HISTORY     Chief Complaint/Reason for Admission:  Follow up Hospital Discharge   PCP: Rebecca Bejarano NP (Inactive)    History of Present Illness:  Mr. Michael German is a 66 y.o. male who was recently admitted to the hospital.    Hx of MS.            ___________________________________________________________________    Today:Recently seen in ER for Chest pain, doing well today, mild shaking spells, some anxiety        Review of Systems  General ROS: negative for chills, fever or weight loss  Psychological ROS: negative for hallucination, depression or suicidal ideation  Ophthalmic ROS: negative for blurry vision, photophobia or eye pain  ENT ROS: negative for epistaxis, sore throat or rhinorrhea  Respiratory ROS: no cough, shortness of breath, or wheezing  Cardiovascular ROS: no chest pain or dyspnea on exertion  Gastrointestinal ROS: no abdominal pain, change in bowel habits, or black/ bloody stools  Genito-Urinary ROS: no dysuria, trouble voiding, or hematuria  Musculoskeletal ROS: negative for gait disturbance or muscular weakness  Neurological ROS: no syncope or seizures; no ataxia  Dermatological ROS: negative for pruritis, rash and jaundice          PAST HISTORY:     Past Medical History:   Diagnosis Date    Acute urinary retention     Acute UTI     treated twice in past three weeks with antibx per patient-since indwelling catheter    Back problem     herniated disc-lumbar region    GERD (gastroesophageal reflux disease)     Hypertension     130s/80s    MS (multiple sclerosis)     Myocardial infarction     6/2020, has 1 stent    Neck stiffness     Neurogenic bladder     has indwelling catheter now    Sleep disturbance     Snoring     Visual impairment        Past Surgical History:   Procedure Laterality Date    COLONOSCOPY N/A 1/31/2019    Procedure: COLONOSCOPY;  Surgeon: Luis HERMOSILLO  MD Erika;  Location: [facility] ENDO;  Service: Endoscopy;  Laterality: N/A;    COLONOSCOPY N/A 5/2/2021    Procedure: COLONOSCOPY;  Surgeon: Nancy Flores MD;  Location: [facility] ENDO;  Service: Endoscopy;  Laterality: N/A;    CORONARY ANGIOPLASTY WITH STENT PLACEMENT  06/24/2019    proximal LAD     FINGER SURGERY      LASER OF PROSTATE W/ GREEN LIGHT PVP      RECTAL BOTOX INJECTION      SPINAL FUSION         Family History   Problem Relation Age of Onset    Dementia Mother     Parkinsonism Father     Heart block Father     Emphysema Father     No Known Problems Sister     Hypertension Brother     Anesthesia problems Neg Hx          MEDICATIONS & ALLERGIES:     Current Outpatient Medications on File Prior to Visit   Medication Sig Dispense Refill    ascorbic acid, vitamin C, (VITAMIN C) 1000 MG tablet Take 1,000 mg by mouth once daily.      atorvastatin (LIPITOR) 20 MG tablet Take 20 mg by mouth every evening.  5    b complex vitamins tablet Take 1 tablet by mouth once daily.      baclofen (LIORESAL) 10 MG tablet Take 10 mg by mouth once daily.      BRILINTA 90 mg tablet Take 1 tablet (90 mg total) by mouth every 12 (twelve) hours. 180 tablet 3    calcium carbonate (TUMS) 200 mg calcium (500 mg) chewable tablet Take 2 tablets by mouth 3 (three) times daily as needed for Heartburn.      carvediloL (COREG) 6.25 MG tablet Take 1 tablet (6.25 mg total) by mouth 2 (two) times daily. 180 tablet 3    gabapentin (NEURONTIN) 100 MG capsule Take 1 capsule (100 mg total) by mouth 3 (three) times daily. 90 capsule 11    lactulose (CHRONULAC) 10 gram/15 mL solution SMARTSIG:Milliliter(s) By Mouth      losartan (COZAAR) 100 MG tablet Take 1 tablet (100 mg total) by mouth once daily. 90 tablet 3    nitroGLYCERIN (NITROSTAT) 0.4 MG SL tablet Place 1 tablet (0.4 mg total) under the tongue every 5 (five) minutes as needed for Chest pain. DO NOT EXCEED A TOTAL OF 3 DOSES IN 15 MINUTES 25 tablet 5    omeprazole  (PRILOSEC) 20 MG capsule Take 20 mg by mouth once daily.      pantoprazole (PROTONIX) 40 MG tablet Take 1 tablet (40 mg total) by mouth once daily. 90 tablet 3    pyridoxine, vitamin B6, (B-6) 100 MG Tab Take 100 mg by mouth once daily.       No current facility-administered medications on file prior to visit.        Review of patient's allergies indicates:  No Known Allergies    OBJECTIVE:     Vital Signs:  There were no vitals taken for this visit.  Wt Readings from Last 1 Encounters:   12/27/21 2220 82.1 kg (181 lb)     There is no height or weight on file to calculate BMI.        Physical Exam:  There were no vitals taken for this visit.  General appearance: alert, cooperative, no distress  Constitutional:Oriented to person, place, and time  + appears well-developed and well-nourished.   HEENT: Normocephalic, atraumatic, neck symmetrical, no nasal discharge   Eyes: conjunctivae/corneas clear, PERRL, EOM's intact  Lungs: clear to auscultation bilaterally, no dullness to percussion bilaterally  Heart: regular rate and rhythm without rub; no displacement of the PMI   Abdomen: soft, non-tender; bowel sounds normoactive; no organomegaly  Extremities: extremities symmetric; no clubbing, cyanosis, or edema  Integument: Skin color, texture, turgor normal; no rashes; hair distrubution normal  Neurologic: Alert and oriented X 3, normal strength, normal coordination and gait  Psychiatric: no pressured speech; normal affect; no evidence of impaired cognition     Laboratory  Lab Results   Component Value Date    WBC 12.41 12/27/2021    HGB 15.6 12/27/2021    HCT 46.8 12/27/2021    MCV 91 12/27/2021     12/27/2021     BMP  Lab Results   Component Value Date     (L) 12/27/2021    K 4.0 12/27/2021    CL 98 12/27/2021    CO2 20 (L) 12/27/2021    BUN 11 12/27/2021    CREATININE 0.7 12/27/2021    CALCIUM 9.5 12/27/2021    ANIONGAP 11 12/27/2021    ESTGFRAFRICA >60.0 12/27/2021    EGFRNONAA >60.0 12/27/2021     Lab  Results   Component Value Date    ALT 55 (H) 12/27/2021    AST 32 12/27/2021    ALKPHOS 114 12/27/2021    BILITOT 0.8 12/27/2021     Lab Results   Component Value Date    INR 1.0 08/01/2016     Lab Results   Component Value Date    HGBA1C 5.4 05/01/2021       Diagnostic Results:        TRANSITION OF CARE:         ASSESSMENT & PLAN:     HIGH RISK CONDITION(S):  Multiple Scerosis  Progressive  Star PT  On Rubio ronontin     Hx of Impaction  Bowel regimen     Instructions for the patient:      Scheduled Follow-up :  Future Appointments   Date Time Provider Department Center   5/4/2022  9:40 AM Darline Delcid MD [facility] UROLOGY [facility]   7/27/2022 10:20 AM Kim Lyon MD [facility] MSC [facility]       Post Visit Medication List:     Medication List          Accurate as of March 11, 2022 10:54 AM. If you have any questions, ask your nurse or doctor.            CONTINUE taking these medications    ascorbic acid (vitamin C) 1000 MG tablet  Commonly known as: VITAMIN C     atorvastatin 20 MG tablet  Commonly known as: LIPITOR     b complex vitamins tablet     baclofen 10 MG tablet  Commonly known as: LIORESAL     BRILINTA 90 mg tablet  Generic drug: ticagrelor  Take 1 tablet (90 mg total) by mouth every 12 (twelve) hours.     calcium carbonate 200 mg calcium (500 mg) chewable tablet  Commonly known as: TUMS     carvediloL 6.25 MG tablet  Commonly known as: COREG  Take 1 tablet (6.25 mg total) by mouth 2 (two) times daily.     gabapentin 100 MG capsule  Commonly known as: NEURONTIN  Take 1 capsule (100 mg total) by mouth 3 (three) times daily.     lactulose 10 gram/15 mL solution  Commonly known as: CHRONULAC     losartan 100 MG tablet  Commonly known as: COZAAR  Take 1 tablet (100 mg total) by mouth once daily.     nitroGLYCERIN 0.4 MG SL tablet  Commonly known as: NITROSTAT  Place 1 tablet (0.4 mg total) under the tongue every 5 (five) minutes as needed for Chest pain. DO NOT EXCEED A TOTAL OF 3 DOSES IN 15 MINUTES      omeprazole 20 MG capsule  Commonly known as: PRILOSEC     pantoprazole 40 MG tablet  Commonly known as: PROTONIX  Take 1 tablet (40 mg total) by mouth once daily.     pyridoxine (vitamin B6) 100 MG Tab  Commonly known as: B-6            Signing Physician:  Rufus Gaines MD

## 2022-03-14 ENCOUNTER — TELEPHONE (OUTPATIENT)
Dept: NEUROLOGY | Facility: CLINIC | Age: 67
End: 2022-03-14
Payer: MEDICARE

## 2022-03-14 ENCOUNTER — LAB VISIT (OUTPATIENT)
Dept: LAB | Facility: OTHER | Age: 67
End: 2022-03-14
Payer: MEDICARE

## 2022-03-14 DIAGNOSIS — Z20.822 ENCOUNTER FOR LABORATORY TESTING FOR COVID-19 VIRUS: ICD-10-CM

## 2022-03-14 PROCEDURE — U0003 INFECTIOUS AGENT DETECTION BY NUCLEIC ACID (DNA OR RNA); SEVERE ACUTE RESPIRATORY SYNDROME CORONAVIRUS 2 (SARS-COV-2) (CORONAVIRUS DISEASE [COVID-19]), AMPLIFIED PROBE TECHNIQUE, MAKING USE OF HIGH THROUGHPUT TECHNOLOGIES AS DESCRIBED BY CMS-2020-01-R: HCPCS | Performed by: EMERGENCY MEDICINE

## 2022-03-14 NOTE — TELEPHONE ENCOUNTER
----- Message from JORDAN Feldman, CNS sent at 3/12/2022  8:53 AM CST -----  CM or UR, can one of your call [facility] in [place] to request an updated copy of the patient's medication list? He did not have this with him at our recent visit, so I'm not sure what meds he is taking.      [phone]

## 2022-03-14 NOTE — TELEPHONE ENCOUNTER
Contacted [facility] in [place] to request updated copy of pt's medication list.  Provided fax number [phone].

## 2022-03-15 LAB
SARS-COV-2 RNA RESP QL NAA+PROBE: NOT DETECTED
SARS-COV-2- CYCLE NUMBER: NORMAL

## 2022-03-15 RX ORDER — GUAIFENESIN 200 MG/1
200 TABLET ORAL EVERY 6 HOURS PRN
COMMUNITY

## 2022-03-15 RX ORDER — MIRABEGRON 50 MG/1
50 TABLET, FILM COATED, EXTENDED RELEASE ORAL DAILY
COMMUNITY

## 2022-03-15 RX ORDER — POLYETHYLENE GLYCOL 3350 17 G/17G
17 POWDER, FOR SOLUTION ORAL DAILY
COMMUNITY
End: 2023-05-01

## 2022-03-15 RX ORDER — SENNOSIDES 8.6 MG/1
1 TABLET ORAL DAILY
COMMUNITY
End: 2023-05-01

## 2022-03-15 RX ORDER — ACETAMINOPHEN 325 MG/1
325 TABLET ORAL EVERY 6 HOURS PRN
COMMUNITY

## 2022-03-15 RX ORDER — DOCUSATE SODIUM 100 MG/1
100 CAPSULE, LIQUID FILLED ORAL NIGHTLY
COMMUNITY
End: 2023-05-01

## 2022-03-16 ENCOUNTER — TELEPHONE (OUTPATIENT)
Dept: PSYCHIATRY | Facility: CLINIC | Age: 67
End: 2022-03-16
Payer: MEDICARE

## 2022-03-16 NOTE — TELEPHONE ENCOUNTER
Spoke with Michael's wife about nursing home experience. They are not pleased with his current treatment, but wary about moving elsewhere as she fears this experience will be similar anywhere they go. Additionally, they like the location of his current nursing home as it is close to family. They have had some interactions with the nursing home sw, but have not seen any real change with his level of care. Will follow up with supervisor about next steps.

## 2022-03-17 NOTE — TELEPHONE ENCOUNTER
Mailed wife a list of nursing homes covered by medicaid and let her know about the medicaid compare nursing homes website. Also, notified her of the homes where some of our patients reside, and have resided long term. Ms. German knows she can follow up with the writer with any additional questions or concerns.

## 2022-05-04 ENCOUNTER — OFFICE VISIT (OUTPATIENT)
Dept: UROLOGY | Facility: CLINIC | Age: 67
End: 2022-05-04
Payer: MEDICARE

## 2022-05-04 VITALS
BODY MASS INDEX: 27.43 KG/M2 | DIASTOLIC BLOOD PRESSURE: 79 MMHG | HEIGHT: 68 IN | SYSTOLIC BLOOD PRESSURE: 124 MMHG | HEART RATE: 79 BPM | WEIGHT: 181 LBS

## 2022-05-04 DIAGNOSIS — G35 MULTIPLE SCLEROSIS: ICD-10-CM

## 2022-05-04 DIAGNOSIS — N31.9 NEUROGENIC BLADDER: Primary | ICD-10-CM

## 2022-05-04 PROCEDURE — 1159F PR MEDICATION LIST DOCUMENTED IN MEDICAL RECORD: ICD-10-PCS | Mod: CPTII,S$GLB,, | Performed by: UROLOGY

## 2022-05-04 PROCEDURE — 99999 PR PBB SHADOW E&M-EST. PATIENT-LVL III: ICD-10-PCS | Mod: PBBFAC,,, | Performed by: UROLOGY

## 2022-05-04 PROCEDURE — 3008F PR BODY MASS INDEX (BMI) DOCUMENTED: ICD-10-PCS | Mod: CPTII,S$GLB,, | Performed by: UROLOGY

## 2022-05-04 PROCEDURE — 4010F ACE/ARB THERAPY RXD/TAKEN: CPT | Mod: CPTII,S$GLB,, | Performed by: UROLOGY

## 2022-05-04 PROCEDURE — 99499 RISK ADDL DX/OHS AUDIT: ICD-10-PCS | Mod: S$GLB,,, | Performed by: UROLOGY

## 2022-05-04 PROCEDURE — 4010F PR ACE/ARB THEARPY RXD/TAKEN: ICD-10-PCS | Mod: CPTII,S$GLB,, | Performed by: UROLOGY

## 2022-05-04 PROCEDURE — 1126F PR PAIN SEVERITY QUANTIFIED, NO PAIN PRESENT: ICD-10-PCS | Mod: CPTII,S$GLB,, | Performed by: UROLOGY

## 2022-05-04 PROCEDURE — 3288F FALL RISK ASSESSMENT DOCD: CPT | Mod: CPTII,S$GLB,, | Performed by: UROLOGY

## 2022-05-04 PROCEDURE — 1101F PT FALLS ASSESS-DOCD LE1/YR: CPT | Mod: CPTII,S$GLB,, | Performed by: UROLOGY

## 2022-05-04 PROCEDURE — 99999 PR PBB SHADOW E&M-EST. PATIENT-LVL III: CPT | Mod: PBBFAC,,, | Performed by: UROLOGY

## 2022-05-04 PROCEDURE — 99499 UNLISTED E&M SERVICE: CPT | Mod: S$GLB,,, | Performed by: UROLOGY

## 2022-05-04 PROCEDURE — 3078F PR MOST RECENT DIASTOLIC BLOOD PRESSURE < 80 MM HG: ICD-10-PCS | Mod: CPTII,S$GLB,, | Performed by: UROLOGY

## 2022-05-04 PROCEDURE — 3288F PR FALLS RISK ASSESSMENT DOCUMENTED: ICD-10-PCS | Mod: CPTII,S$GLB,, | Performed by: UROLOGY

## 2022-05-04 PROCEDURE — 3074F SYST BP LT 130 MM HG: CPT | Mod: CPTII,S$GLB,, | Performed by: UROLOGY

## 2022-05-04 PROCEDURE — 99205 PR OFFICE/OUTPT VISIT, NEW, LEVL V, 60-74 MIN: ICD-10-PCS | Mod: S$GLB,,, | Performed by: UROLOGY

## 2022-05-04 PROCEDURE — 3074F PR MOST RECENT SYSTOLIC BLOOD PRESSURE < 130 MM HG: ICD-10-PCS | Mod: CPTII,S$GLB,, | Performed by: UROLOGY

## 2022-05-04 PROCEDURE — 1126F AMNT PAIN NOTED NONE PRSNT: CPT | Mod: CPTII,S$GLB,, | Performed by: UROLOGY

## 2022-05-04 PROCEDURE — 99205 OFFICE O/P NEW HI 60 MIN: CPT | Mod: S$GLB,,, | Performed by: UROLOGY

## 2022-05-04 PROCEDURE — 3008F BODY MASS INDEX DOCD: CPT | Mod: CPTII,S$GLB,, | Performed by: UROLOGY

## 2022-05-04 PROCEDURE — 1101F PR PT FALLS ASSESS DOC 0-1 FALLS W/OUT INJ PAST YR: ICD-10-PCS | Mod: CPTII,S$GLB,, | Performed by: UROLOGY

## 2022-05-04 PROCEDURE — 3078F DIAST BP <80 MM HG: CPT | Mod: CPTII,S$GLB,, | Performed by: UROLOGY

## 2022-05-04 PROCEDURE — 1159F MED LIST DOCD IN RCRD: CPT | Mod: CPTII,S$GLB,, | Performed by: UROLOGY

## 2022-05-04 NOTE — PROGRESS NOTES
CHIEF COMPLAINT:    Mr. German is a 66 y.o. male presenting for a discussion of urinary incontinence, neurogenic bladder associated with MS    PRESENTING ILLNESS:    Michael German is a 66 y.o. male who has a history of MS and right sided hemiplegia.  He is now living at [facility].  This appointment was made because he has urinary incontinence.  He cannot transfer from the bed to his power chair independently, he needs to be transferred with a Randee lift.  He states that his bowels and bladder are solely managed with a diaper which gets changed on a schedule of 5:30 am, 10:30 am, 3:30 pm and 8:30 pm.  Many times he is dry in the am.  He sometimes notes a incontinence at night, also has venous insufficiency.  The last time I saw him in the office was in 2016.  He used to get Botox injection of the sphincter for DESD.  His situation is such that he is not transferred to the toilet even if he verbalizes the need to defecate, sometimes it is hours before he can be changed. He is not comfortable going in a bedpan.  He states that some of the assistants asked about him having a catheter.  However, he has not had a urinary tract infection in several years.   He is on Myrbetriq 50 mg.  He denies having any skin breakdown issues presently.     Review of the chart reveals that he had a CT Abd/pelvis 4/30/2021 which showed no masses, hydronephrosis or stones in the kidneys.  His renal function has remained normal. Cr 0.7 in December.    He has had progressive MS, with a lack of physical therapy or consistent PT, he has noted becoming stiffer in his upper and lower extremities.      REVIEW OF SYSTEMS:    Review of Systems   Constitutional: Negative.    HENT: Negative.    Eyes: Negative.    Respiratory: Negative.    Cardiovascular: Negative.    Gastrointestinal: Negative.    Genitourinary:        Urinary incontinene   Musculoskeletal:        Right hemiparesis, uses a power chair   Skin: Negative.    Neurological:  Positive for weakness.        MS   Endo/Heme/Allergies: Negative.    Psychiatric/Behavioral: Negative.        PATIENT HISTORY:    Past Medical History:   Diagnosis Date    Acute urinary retention     Acute UTI     treated twice in past three weeks with antibx per patient-since indwelling catheter    Back problem     herniated disc-lumbar region    GERD (gastroesophageal reflux disease)     Hypertension     130s/80s    MS (multiple sclerosis)     Myocardial infarction     6/2020, has 1 stent    Neck stiffness     Neurogenic bladder     has indwelling catheter now    Sleep disturbance     Snoring     Visual impairment        Past Surgical History:   Procedure Laterality Date    COLONOSCOPY N/A 1/31/2019    Procedure: COLONOSCOPY;  Surgeon: Luis James MD;  Location: [facility] ENDO;  Service: Endoscopy;  Laterality: N/A;    COLONOSCOPY N/A 5/2/2021    Procedure: COLONOSCOPY;  Surgeon: Nancy Flores MD;  Location: [facility] ENDO;  Service: Endoscopy;  Laterality: N/A;    CORONARY ANGIOPLASTY WITH STENT PLACEMENT  06/24/2019    proximal LAD     FINGER SURGERY      LASER OF PROSTATE W/ GREEN LIGHT PVP      RECTAL BOTOX INJECTION      SPINAL FUSION         Family History   Problem Relation Age of Onset    Dementia Mother     Parkinsonism Father     Heart block Father     Emphysema Father     No Known Problems Sister     Hypertension Brother     Anesthesia problems Neg Hx        Social History     Socioeconomic History    Marital status:      Spouse name: Bindu German    Number of children: 2    Highest education level: Some college, no degree   Tobacco Use    Smoking status: Never Smoker    Smokeless tobacco: Never Used   Substance and Sexual Activity    Alcohol use: Yes     Alcohol/week: 1.0 standard drink     Types: 1 Cans of beer per week     Comment: occasionally drinks    Drug use: No    Sexual activity: Not Currently   Social History Narrative    65  y/o male, lives  with home in one story residential home.     Has 2 daughters, Betty lives in [facility] and Lien lives in [place].         Taoist: Scientologist            Allergies:  Patient has no known allergies.    Medications:  Outpatient Encounter Medications as of 5/4/2022   Medication Sig Dispense Refill    acetaminophen (TYLENOL) 325 MG tablet Take 325 mg by mouth every 6 (six) hours as needed for Pain (2 TAB).      ascorbic acid, vitamin C, (VITAMIN C) 1000 MG tablet Take 500 mg by mouth 2 (two) times a day.      atorvastatin (LIPITOR) 20 MG tablet Take 20 mg by mouth every evening.  5    b complex vitamins tablet Take 1 tablet by mouth once daily.      baclofen (LIORESAL) 10 MG tablet Take 5 mg by mouth 2 (two) times daily.      BRILINTA 90 mg tablet Take 1 tablet (90 mg total) by mouth every 12 (twelve) hours. 180 tablet 3    calcium carbonate (TUMS) 200 mg calcium (500 mg) chewable tablet Take 2 tablets by mouth every 8 (eight) hours as needed for Heartburn.      carvediloL (COREG) 6.25 MG tablet Take 1 tablet (6.25 mg total) by mouth 2 (two) times daily. 180 tablet 3    docusate sodium (COLACE) 100 MG capsule Take 100 mg by mouth nightly.      guaiFENesin 200 mg tablet Take 200 mg by mouth every 6 (six) hours as needed. 200mg/10ml      lactulose (CHRONULAC) 10 gram/15 mL solution SMARTSIG:Milliliter(s) By Mouth      losartan (COZAAR) 100 MG tablet Take 1 tablet (100 mg total) by mouth once daily. 90 tablet 3    mirabegron (MYRBETRIQ) 50 mg Tb24 Take 50 mg by mouth once daily.      nitroGLYCERIN (NITROSTAT) 0.4 MG SL tablet Place 1 tablet (0.4 mg total) under the tongue every 5 (five) minutes as needed for Chest pain. DO NOT EXCEED A TOTAL OF 3 DOSES IN 15 MINUTES 25 tablet 5    omeprazole (PRILOSEC) 20 MG capsule Take 20 mg by mouth once daily.      pantoprazole (PROTONIX) 40 MG tablet Take 1 tablet (40 mg total) by mouth once daily. 90 tablet 3    polyethylene glycol (GLYCOLAX) 17 gram PwPk Take 17 g by  mouth once daily. 17 GMS IN 8 OZ OF WATER PO QD PRN CONSTIPATION      pyridoxine, vitamin B6, (B-6) 100 MG Tab Take 100 mg by mouth once daily.      senna (SENOKOT) 8.6 mg tablet Take 1 tablet by mouth once daily.      sodium chloride (OCEAN) 0.65 % nasal spray 2 sprays by Nasal route every 8 (eight) hours as needed for Congestion.      gabapentin (NEURONTIN) 100 MG capsule Take 1 capsule (100 mg total) by mouth 3 (three) times daily. 90 capsule 11     No facility-administered encounter medications on file as of 5/4/2022.         PHYSICAL EXAMINATION:    The patient generally appears in good health, is appropriately interactive, and is in no apparent distress.    Skin: No lesions.    Mental: Cooperative with normal affect.    Neuro: Grossly intact.    HEENT: Normal. No evidence of lymphadenopathy.    Chest:  normal inspiratory effort.    Abdomen: Soft, non-tender. No masses or organomegaly. Bladder is not palpable. No evidence of flank discomfort. No evidence of inguinal hernia.    Extremities: No clubbing, cyanosis, or edema    Random bladder volume 640 ml    LABS:    Lab Results   Component Value Date    BUN 11 12/27/2021    CREATININE 0.7 12/27/2021     CT abd/pelvis 4/30/2021 kidneys without hydronephrosis or hydroureter or masses    IMPRESSION:    Encounter Diagnoses   Name Primary?    Neurogenic bladder Yes    Multiple sclerosis        PLAN:    1.    Given the practical constraints that the patient is living under, doubt that improving the urge incontinence will improve his quality of life as he is not assisted to the toilet (there are dangers in transferring without skilled people to do so) does not wish to use a urinal as he has difficulty adjusting his position.    2.  OK to continue Myrbetiriq  3.  Continue the changes of the incontinence (diaper or pull up) on the above schedule and as needed  4.  Catheter is not recommended as it will only lead to colonization and risk of CAUTI  5.  Return as  needed.      I spent 60 minutes with the patient of which more than half was spent in direct consultation with the patient in regards to our treatment and plan.      Progress note faxed to [facility] at [phone]      NOTE:  Patient was over an hour late for the appointment due to transportation issues.  Was worked in and seen at noon.

## 2022-06-15 ENCOUNTER — TELEPHONE (OUTPATIENT)
Dept: NEUROLOGY | Facility: CLINIC | Age: 67
End: 2022-06-15

## 2022-06-15 DIAGNOSIS — R25.2 SPASTICITY: Primary | ICD-10-CM

## 2022-06-24 ENCOUNTER — PATIENT MESSAGE (OUTPATIENT)
Dept: PSYCHIATRY | Facility: CLINIC | Age: 67
End: 2022-06-24
Payer: MEDICARE

## 2022-08-16 ENCOUNTER — TELEPHONE (OUTPATIENT)
Dept: NEUROLOGY | Facility: CLINIC | Age: 67
End: 2022-08-16

## 2022-08-16 NOTE — TELEPHONE ENCOUNTER
----- Message from Frida Jeffery sent at 8/16/2022  8:32 AM CDT -----  Contact: @ [phone]  Pt wife is calling she has some questions in regards to where her  will be doing PT he is in a nursing facility and will be brought to his appointment but she needs more information  please call and adv @ [phone]

## 2022-09-14 ENCOUNTER — OFFICE VISIT (OUTPATIENT)
Dept: NEUROLOGY | Facility: CLINIC | Age: 67
End: 2022-09-14
Payer: MEDICARE

## 2022-09-14 VITALS
HEART RATE: 66 BPM | SYSTOLIC BLOOD PRESSURE: 114 MMHG | DIASTOLIC BLOOD PRESSURE: 77 MMHG | HEIGHT: 68 IN | BODY MASS INDEX: 27.52 KG/M2

## 2022-09-14 DIAGNOSIS — Z99.3 WHEELCHAIR BOUND: ICD-10-CM

## 2022-09-14 DIAGNOSIS — N31.9 NEUROGENIC BLADDER: ICD-10-CM

## 2022-09-14 DIAGNOSIS — Z71.89 COUNSELING REGARDING GOALS OF CARE: ICD-10-CM

## 2022-09-14 DIAGNOSIS — Z78.9 IMPAIRED MOBILITY AND ADLS: ICD-10-CM

## 2022-09-14 DIAGNOSIS — Z74.09 IMPAIRED MOBILITY AND ADLS: ICD-10-CM

## 2022-09-14 DIAGNOSIS — G35 MULTIPLE SCLEROSIS: Primary | ICD-10-CM

## 2022-09-14 DIAGNOSIS — D84.9 IMMUNOSUPPRESSION: ICD-10-CM

## 2022-09-14 PROCEDURE — 99215 PR OFFICE/OUTPT VISIT, EST, LEVL V, 40-54 MIN: ICD-10-PCS | Mod: S$GLB,,, | Performed by: PSYCHIATRY & NEUROLOGY

## 2022-09-14 PROCEDURE — 99999 PR PBB SHADOW E&M-EST. PATIENT-LVL IV: ICD-10-PCS | Mod: PBBFAC,,, | Performed by: PSYCHIATRY & NEUROLOGY

## 2022-09-14 PROCEDURE — 1160F RVW MEDS BY RX/DR IN RCRD: CPT | Mod: CPTII,S$GLB,, | Performed by: PSYCHIATRY & NEUROLOGY

## 2022-09-14 PROCEDURE — 3288F PR FALLS RISK ASSESSMENT DOCUMENTED: ICD-10-PCS | Mod: CPTII,S$GLB,, | Performed by: PSYCHIATRY & NEUROLOGY

## 2022-09-14 PROCEDURE — 3078F DIAST BP <80 MM HG: CPT | Mod: CPTII,S$GLB,, | Performed by: PSYCHIATRY & NEUROLOGY

## 2022-09-14 PROCEDURE — 3008F PR BODY MASS INDEX (BMI) DOCUMENTED: ICD-10-PCS | Mod: CPTII,S$GLB,, | Performed by: PSYCHIATRY & NEUROLOGY

## 2022-09-14 PROCEDURE — 4010F PR ACE/ARB THEARPY RXD/TAKEN: ICD-10-PCS | Mod: CPTII,S$GLB,, | Performed by: PSYCHIATRY & NEUROLOGY

## 2022-09-14 PROCEDURE — 4010F ACE/ARB THERAPY RXD/TAKEN: CPT | Mod: CPTII,S$GLB,, | Performed by: PSYCHIATRY & NEUROLOGY

## 2022-09-14 PROCEDURE — 99999 PR PBB SHADOW E&M-EST. PATIENT-LVL IV: CPT | Mod: PBBFAC,,, | Performed by: PSYCHIATRY & NEUROLOGY

## 2022-09-14 PROCEDURE — 1159F MED LIST DOCD IN RCRD: CPT | Mod: CPTII,S$GLB,, | Performed by: PSYCHIATRY & NEUROLOGY

## 2022-09-14 PROCEDURE — 1160F PR REVIEW ALL MEDS BY PRESCRIBER/CLIN PHARMACIST DOCUMENTED: ICD-10-PCS | Mod: CPTII,S$GLB,, | Performed by: PSYCHIATRY & NEUROLOGY

## 2022-09-14 PROCEDURE — 3078F PR MOST RECENT DIASTOLIC BLOOD PRESSURE < 80 MM HG: ICD-10-PCS | Mod: CPTII,S$GLB,, | Performed by: PSYCHIATRY & NEUROLOGY

## 2022-09-14 PROCEDURE — 1101F PR PT FALLS ASSESS DOC 0-1 FALLS W/OUT INJ PAST YR: ICD-10-PCS | Mod: CPTII,S$GLB,, | Performed by: PSYCHIATRY & NEUROLOGY

## 2022-09-14 PROCEDURE — 1126F AMNT PAIN NOTED NONE PRSNT: CPT | Mod: CPTII,S$GLB,, | Performed by: PSYCHIATRY & NEUROLOGY

## 2022-09-14 PROCEDURE — 3288F FALL RISK ASSESSMENT DOCD: CPT | Mod: CPTII,S$GLB,, | Performed by: PSYCHIATRY & NEUROLOGY

## 2022-09-14 PROCEDURE — 3074F SYST BP LT 130 MM HG: CPT | Mod: CPTII,S$GLB,, | Performed by: PSYCHIATRY & NEUROLOGY

## 2022-09-14 PROCEDURE — 99499 UNLISTED E&M SERVICE: CPT | Mod: S$GLB,,, | Performed by: PSYCHIATRY & NEUROLOGY

## 2022-09-14 PROCEDURE — 1159F PR MEDICATION LIST DOCUMENTED IN MEDICAL RECORD: ICD-10-PCS | Mod: CPTII,S$GLB,, | Performed by: PSYCHIATRY & NEUROLOGY

## 2022-09-14 PROCEDURE — 3074F PR MOST RECENT SYSTOLIC BLOOD PRESSURE < 130 MM HG: ICD-10-PCS | Mod: CPTII,S$GLB,, | Performed by: PSYCHIATRY & NEUROLOGY

## 2022-09-14 PROCEDURE — 1101F PT FALLS ASSESS-DOCD LE1/YR: CPT | Mod: CPTII,S$GLB,, | Performed by: PSYCHIATRY & NEUROLOGY

## 2022-09-14 PROCEDURE — 99215 OFFICE O/P EST HI 40 MIN: CPT | Mod: S$GLB,,, | Performed by: PSYCHIATRY & NEUROLOGY

## 2022-09-14 PROCEDURE — 1126F PR PAIN SEVERITY QUANTIFIED, NO PAIN PRESENT: ICD-10-PCS | Mod: CPTII,S$GLB,, | Performed by: PSYCHIATRY & NEUROLOGY

## 2022-09-14 PROCEDURE — 99499 RISK ADDL DX/OHS AUDIT: ICD-10-PCS | Mod: S$GLB,,, | Performed by: PSYCHIATRY & NEUROLOGY

## 2022-09-14 PROCEDURE — 3008F BODY MASS INDEX DOCD: CPT | Mod: CPTII,S$GLB,, | Performed by: PSYCHIATRY & NEUROLOGY

## 2022-09-14 NOTE — Clinical Note
"CELESTE, pt now lives at [facility] in [place] which he states "is terrible".  He reports he was recenlty approved for Medicaid;  Also has medicare.  Any change for waiver for him?  He's like to go home but could only do it with CC waiver support.  His wife, however, is not so keen on that idea, even with lots of support.  Let's discuss at our next meeting  "

## 2022-09-14 NOTE — PROGRESS NOTES
"Subjective:          Patient ID: Michael German is a 67 y.o. male who presents today for a routine clinic visit for MS.      MS HPI:  Living at [facility] in [place];    He "almost never" gets out of bed at the NH b/c "no one knows how to operate the Randee lift".    He has a power chair, but he rarely uses it.   He has a  wound on his buttock; he gets wound care every day;  Wears depends; on Myrbetriq.  He does not ever uses the commode. Does not self cath or use condom cath.  No UTIs.  He sometimes has to wait "hours" to get changed.   He wants to move to [facility] in [place] --he's been accepted, but on wait list for a spot to open up  On baclofen 5mg BID   He states his left hand is getting stiffer.       Medications:  Current Outpatient Medications   Medication Sig    acetaminophen (TYLENOL) 325 MG tablet Take 325 mg by mouth every 6 (six) hours as needed for Pain (2 TAB).    ascorbic acid, vitamin C, (VITAMIN C) 1000 MG tablet Take 500 mg by mouth 2 (two) times a day.    b complex vitamins tablet Take 1 tablet by mouth once daily.    baclofen (LIORESAL) 10 MG tablet Take 5 mg by mouth 2 (two) times daily.    BRILINTA 90 mg tablet Take 1 tablet (90 mg total) by mouth every 12 (twelve) hours.    calcium carbonate (TUMS) 200 mg calcium (500 mg) chewable tablet Take 2 tablets by mouth every 8 (eight) hours as needed for Heartburn.    mirabegron (MYRBETRIQ) 50 mg Tb24 Take 50 mg by mouth once daily.    nitroGLYCERIN (NITROSTAT) 0.4 MG SL tablet Place 1 tablet (0.4 mg total) under the tongue every 5 (five) minutes as needed for Chest pain. DO NOT EXCEED A TOTAL OF 3 DOSES IN 15 MINUTES    omeprazole (PRILOSEC) 20 MG capsule Take 20 mg by mouth once daily.    pantoprazole (PROTONIX) 40 MG tablet Take 1 tablet (40 mg total) by mouth once daily.    sodium chloride (OCEAN) 0.65 % nasal spray 2 sprays by Nasal route every 8 (eight) hours as needed for Congestion.    atorvastatin (LIPITOR) 20 MG tablet " Take 20 mg by mouth every evening.    carvediloL (COREG) 6.25 MG tablet Take 1 tablet (6.25 mg total) by mouth 2 (two) times daily. (Patient not taking: Reported on 9/14/2022)    docusate sodium (COLACE) 100 MG capsule Take 100 mg by mouth nightly.    gabapentin (NEURONTIN) 100 MG capsule Take 1 capsule (100 mg total) by mouth 3 (three) times daily.    guaiFENesin 200 mg tablet Take 200 mg by mouth every 6 (six) hours as needed. 200mg/10ml    lactulose (CHRONULAC) 10 gram/15 mL solution SMARTSIG:Milliliter(s) By Mouth    losartan (COZAAR) 100 MG tablet Take 1 tablet (100 mg total) by mouth once daily. (Patient not taking: Reported on 9/14/2022)    polyethylene glycol (GLYCOLAX) 17 gram PwPk Take 17 g by mouth once daily. 17 GMS IN 8 OZ OF WATER PO QD PRN CONSTIPATION    pyridoxine, vitamin B6, (B-6) 100 MG Tab Take 100 mg by mouth once daily.    senna (SENOKOT) 8.6 mg tablet Take 1 tablet by mouth once daily.     SOCIAL HISTORY  Social History     Tobacco Use    Smoking status: Never    Smokeless tobacco: Never   Substance Use Topics    Alcohol use: Yes     Alcohol/week: 1.0 standard drink     Types: 1 Cans of beer per week     Comment: occasionally drinks    Drug use: No       Living arrangements - the patient lives with their family.    REVIEW OF SYMPTOMS 4/13/2021   Do you feel abnormally tired on most days? Yes   Do you feel you generally sleep well? Yes   Do you have difficulty controlling your bladder?  Yes   Do you have difficulty controlling your bowels?  Yes   Do you have frequent muscle cramps, tightness or spasms in your limbs?  Yes   Do you have new visual symptoms?  Yes   Do you have worsening difficulty with your memory or thinking? Yes   Do you have worsening symptoms of anxiety or depression?  No   For patients who walk, Do you have more difficulty walking?  Not Applicable   Have you fallen since your last visit?  No   For patients who use wheelchairs: Do you have any skin wounds or breakdown? Yes    Do you have difficulty using your hands?  Yes   Do you have shooting or burning pain? No   Do you have difficulty with sexual function?  Yes   If you are sexually active, are you using birth control? Y/N  N/A Not Applicable   Do you often choke when swallowing liquids or solid food?  No   Do you experience worsening symptoms when overheated? Yes   Do you need any new equipment such as a wheelchair, walker or shower chair? No   Do you receive co-pay financial assistance for your principal MS medicine? No   Would you be interested in participating in an MS research trial in the future? No   For patients on Gilenya, Tecfidera, Aubagio, Rituxan, Ocrevus, Tysabri, Lemtrada or Methotrexate, are you aware that you should NOT receive live virus vaccines?  Not Applicable   Do you feel you have adequate family/friend support?  Yes   Do you have health insurance?   Yes   Are you currently employed? No   Do you receive SSDI/SSI?  Yes   Do you use marijuana or cannabis products? No   Have you been diagnosed with a urinary tract infection since your last visit here? No   Have you been diagnosed with a respiratory tract infection since your last visit here? No   Have you been to the emergency room since your last visit here? No   Have you been hospitalized since your last visit here?  No                Objective:        Neurologic Exam    In Power chair  Slumped to the right; on Randee sling;   Right hemiplegia  LUE 3/5 in extensors and LLE 1/5 in flexors  LLE plegic  Unable to feel tuning fork on right arm or leg     Imaging:     Results for orders placed during the hospital encounter of 02/27/19    MRI Brain Demyelinating Without Contrast    Impression  1. There is no acute abnormality.  Also, there is no definite or significant change in the appearance of the brain compared to the prior study.  The study is motion degraded.  There is moderate volume loss.  There is a moderate burden of white matter disease.  These findings  were present previously and are consistent with the provided diagnosis of multiple sclerosis.  There is not been any significant interval progression.  There is no acute infarction, hemorrhage or mass/mass effect.  2. Persistent right mastoid partial opacification may represent an effusion.      Electronically signed by: Satish Wan MD  Date:    02/27/2019  Time:    11:31    No results found for this or any previous visit.    No results found for this or any previous visit.    Results for orders placed during the hospital encounter of 03/03/20    MRI Brain Demyelinating W W/O Contrast    Impression  Significant motion degradation.    White matter changes as described above, not significantly changed compared to the previous examination.    Involutional changes.      Electronically signed by: Yaneli Cantu IV., MD  Date:    03/03/2020  Time:    13:46    No results found for this or any previous visit.    No results found for this or any previous visit.        Labs:     Lab Results   Component Value Date    UVABBLHH16BG 25 (L) 04/13/2021    TRUIBUDG71NT 27 (L) 08/17/2020    QCTOVVJH50SS 29 (L) 02/20/2019     Lab Results   Component Value Date    JCVINDEX 0.50 (A) 05/11/2016    JCVANTIBODY Positive (A) 05/11/2016     Lab Results   Component Value Date    HA3HIBEU 65.6 05/11/2016    ABSOLUTECD3 476 (L) 05/11/2016    NB7JCOAH 21.2 05/11/2016    ABSOLUTECD8 154 (L) 05/11/2016    LR2TUCDU 44.2 05/11/2016    ABSOLUTECD4 321 05/11/2016    LABCD48 2.09 05/11/2016     Lab Results   Component Value Date    WBC 12.41 12/27/2021    RBC 5.17 12/27/2021    HGB 15.6 12/27/2021    HCT 46.8 12/27/2021    MCV 91 12/27/2021    MCH 30.2 12/27/2021    MCHC 33.3 12/27/2021    RDW 14.0 12/27/2021     12/27/2021    MPV 10.7 12/27/2021    GRAN 9.8 (H) 12/27/2021    GRAN 78.6 (H) 12/27/2021    LYMPH 1.2 12/27/2021    LYMPH 9.6 (L) 12/27/2021    MONO 1.1 (H) 12/27/2021    MONO 8.8 12/27/2021    EOS 0.3 12/27/2021    BASO 0.03  12/27/2021    EOSINOPHIL 2.2 12/27/2021    BASOPHIL 0.2 12/27/2021     Sodium   Date Value Ref Range Status   12/27/2021 129 (L) 136 - 145 mmol/L Final     Potassium   Date Value Ref Range Status   12/27/2021 4.0 3.5 - 5.1 mmol/L Final     Chloride   Date Value Ref Range Status   12/27/2021 98 95 - 110 mmol/L Final     CO2   Date Value Ref Range Status   12/27/2021 20 (L) 23 - 29 mmol/L Final     Glucose   Date Value Ref Range Status   12/27/2021 95 70 - 110 mg/dL Final     BUN   Date Value Ref Range Status   12/27/2021 11 8 - 23 mg/dL Final     Creatinine   Date Value Ref Range Status   12/27/2021 0.7 0.5 - 1.4 mg/dL Final   11/03/2012 0.9 0.5 - 1.4 mg/dL Final     Calcium   Date Value Ref Range Status   12/27/2021 9.5 8.7 - 10.5 mg/dL Final   11/03/2012 9.8 8.7 - 10.5 mg/dL Final     Total Protein   Date Value Ref Range Status   12/27/2021 7.0 6.0 - 8.4 g/dL Final     Albumin   Date Value Ref Range Status   12/27/2021 3.7 3.5 - 5.2 g/dL Final     Total Bilirubin   Date Value Ref Range Status   12/27/2021 0.8 0.1 - 1.0 mg/dL Final     Comment:     For infants and newborns, interpretation of results should be based  on gestational age, weight and in agreement with clinical  observations.    Premature Infant recommended reference ranges:  Up to 24 hours.............<8.0 mg/dL  Up to 48 hours............<12.0 mg/dL  3-5 days..................<15.0 mg/dL  6-29 days.................<15.0 mg/dL       Alkaline Phosphatase   Date Value Ref Range Status   12/27/2021 114 55 - 135 U/L Final     AST   Date Value Ref Range Status   12/27/2021 32 10 - 40 U/L Final     ALT   Date Value Ref Range Status   12/27/2021 55 (H) 10 - 44 U/L Final     Anion Gap   Date Value Ref Range Status   12/27/2021 11 8 - 16 mmol/L Final   11/03/2012 13 5 - 15 meq/L Final     eGFR if    Date Value Ref Range Status   12/27/2021 >60.0 >60 mL/min/1.73 m^2 Final     eGFR if non    Date Value Ref Range Status   12/27/2021  >60.0 >60 mL/min/1.73 m^2 Final     Comment:     Calculation used to obtain the estimated glomerular filtration  rate (eGFR) is the CKD-EPI equation.        Lab Results   Component Value Date    HEPBSAG Negative 08/17/2020    HEPBSAB Positive (A) 08/17/2020    HEPBCAB Negative 08/17/2020           MS Impression and Plan:     NEURO MULTIPLE SCLEROSIS IMPRESSION:   MS Status:     Number of relapses in the past year?:  0    Clinical Progression:  Clinically Stable    MRI Progression:  Stable  Plan:     DMT:  No change in management    Symptom Management:  Implement change in symptom management    : PT / OT ordered at NH; will reach out to our SW team about his living situation, CC waiver.     He has advanced disability; non-active SPMS.   F/u [facility] in 6 mo             Problem List Items Addressed This Visit          Unprioritized    Immunosuppression    Neurogenic bladder    Multiple sclerosis - Primary     Other Visit Diagnoses       Impaired mobility and ADLs        Counseling regarding goals of care        Wheelchair bound                Kim Lyon MD    I spent a total of 40 minutes on the day of the visit.This includes face to face time and non-face to face time preparing to see the patient (eg, review of tests), obtaining and/or reviewing separately obtained history, documenting clinical information in the electronic or other health record, independently interpreting results and communicating results to the patient/family/caregiver, or care coordinator.

## 2022-11-03 ENCOUNTER — TELEPHONE (OUTPATIENT)
Dept: NEUROLOGY | Facility: CLINIC | Age: 67
End: 2022-11-03
Payer: MEDICARE

## 2022-11-03 NOTE — TELEPHONE ENCOUNTER
----- Message from Deedee Vidal sent at 11/3/2022  9:24 AM CDT -----  Regarding: advice about PT  Contact: pt [phone]  Michael German calling regarding Patient Advice (message) about PT.  He stated he also have other question and would like for nurse to call him back.  call back [phone]

## 2022-11-11 NOTE — TELEPHONE ENCOUNTER
SW received message to call pt back as he had questions about PT and other concerns.  LEVON.    TRISTON also received referral from LO Lyon MD advising pt was recently awarded Medicaid and was curious about the [employer] Waiver as he does not like his nursing home.  Provider shared that pt wants to return home with aide services, but his wife has concerns about the amount of care that she will still be responsible for providing.  Attempted to reach pt's spouse at [phone], but there was no answer.     Pt called right back. He resides in [facility] in [place], following a hospital admission last May 2021.  He shared that he'd like to get regular, long-term PT in the hopes he might get strong enough to return home and be able to assist with his ADLs.      He stated 18 mos ago he was home, getting some help from his wife, and was more independent.  He shared that after 3rd Ocrevus infusion and following Covid immunizations he became weaker, got very sick, and ended up in the hospital.  He's never recovered his functional abilities but believes he can. (Progress notes prior to is illness in May 2021 show he was already getting progressively weaker and was requiring assistance from an aide, wound care, Randee lift, hospital bed, etc).    Although pt's request for maintenance therapy is reasonable, this LCSW is uncertain that he would recover enough functioning to be successful at home with waiver services and his wife's assistance, since she has expressed concerns about this to his provider.  Will discuss with neurology provider.

## 2022-12-01 ENCOUNTER — PATIENT MESSAGE (OUTPATIENT)
Dept: PSYCHIATRY | Facility: CLINIC | Age: 67
End: 2022-12-01
Payer: MEDICARE

## 2022-12-05 ENCOUNTER — TELEPHONE (OUTPATIENT)
Dept: PSYCHIATRY | Facility: CLINIC | Age: 67
End: 2022-12-05
Payer: MEDICARE

## 2022-12-05 NOTE — TELEPHONE ENCOUNTER
SW attempted to reach pt's wife again to discuss her concerns/questions about pt's care in SNF ([facility] in [place]) and pt's desire to return home eventually with Medicaid waiver services.  LVM.     Spoke with wife by phone after confirming her contact information.  She would like  to get more therapy in the SNF, but she is reluctant to have him back home when he is so dependent for care.  She relayed the story of how he ended up in a SNF, after she and he both fell ill.  She was very tearful during the conversation.  SW provided support and acknowledged her concerns.      SW will investigate options for PT/OT at [facility] in [place], but she explained that one issue may be that Medicare will only pay for so many days of skilled services every benefit period.      Phoned [facility] [phone], and spoke with pt's TRISTON Madrigal, who said she could place a request to PT for therapy services.  She said most residents obtain therapy 3-4x/year but she wasn't sure how long each encounter lasted.

## 2022-12-13 ENCOUNTER — TELEPHONE (OUTPATIENT)
Dept: NEUROLOGY | Facility: CLINIC | Age: 67
End: 2022-12-13
Payer: MEDICARE

## 2022-12-14 NOTE — TELEPHONE ENCOUNTER
----- Message from Emerita Chirinos sent at 12/13/2022 11:24 AM CST -----  Regarding: Refill  Contact: Dara [phone]  [employer] [facility] (Dara) called on behalf of patient to request a pre authorization for the  Out patient PT to continue Please call to discuss further

## 2022-12-24 ENCOUNTER — HOSPITAL ENCOUNTER (EMERGENCY)
Facility: HOSPITAL | Age: 67
Discharge: HOME | End: 2022-12-25
Attending: EMERGENCY MEDICINE
Payer: MEDICARE

## 2022-12-24 DIAGNOSIS — S80.01XA CONTUSION OF RIGHT KNEE, INITIAL ENCOUNTER: ICD-10-CM

## 2022-12-24 DIAGNOSIS — M25.569 ACUTE KNEE PAIN: ICD-10-CM

## 2022-12-24 DIAGNOSIS — G35 HISTORY OF MULTIPLE SCLEROSIS: ICD-10-CM

## 2022-12-24 DIAGNOSIS — S06.5XAA: Primary | ICD-10-CM

## 2022-12-24 PROCEDURE — 99285 EMERGENCY DEPT VISIT HI MDM: CPT | Mod: 25

## 2022-12-24 PROCEDURE — 99291 CRITICAL CARE FIRST HOUR: CPT | Mod: ,,, | Performed by: EMERGENCY MEDICINE

## 2022-12-24 PROCEDURE — 99291 PR CRITICAL CARE, E/M 30-74 MINUTES: ICD-10-PCS | Mod: ,,, | Performed by: EMERGENCY MEDICINE

## 2022-12-24 RX ORDER — BACITRACIN ZINC 500 UNIT/G
1 OINTMENT (GRAM) TOPICAL
Status: DISCONTINUED | OUTPATIENT
Start: 2022-12-24 | End: 2022-12-25

## 2022-12-25 VITALS
TEMPERATURE: 98 F | HEART RATE: 83 BPM | WEIGHT: 181 LBS | SYSTOLIC BLOOD PRESSURE: 116 MMHG | DIASTOLIC BLOOD PRESSURE: 76 MMHG | BODY MASS INDEX: 27.52 KG/M2 | RESPIRATION RATE: 16 BRPM | OXYGEN SATURATION: 95 %

## 2022-12-25 PROBLEM — S06.5XAA SUBDURAL HEMATOMA: Status: ACTIVE | Noted: 2022-12-25

## 2022-12-25 LAB
ABO + RH BLD: NORMAL
ALBUMIN SERPL BCP-MCNC: 3.8 G/DL (ref 3.5–5.2)
ALP SERPL-CCNC: 125 U/L (ref 55–135)
ALT SERPL W/O P-5'-P-CCNC: 48 U/L (ref 10–44)
ANION GAP SERPL CALC-SCNC: 8 MMOL/L (ref 8–16)
AST SERPL-CCNC: 28 U/L (ref 10–40)
BASOPHILS # BLD AUTO: 0.03 K/UL (ref 0–0.2)
BASOPHILS NFR BLD: 0.3 % (ref 0–1.9)
BILIRUB SERPL-MCNC: 0.6 MG/DL (ref 0.1–1)
BLD GP AB SCN CELLS X3 SERPL QL: NORMAL
BUN SERPL-MCNC: 13 MG/DL (ref 8–23)
CALCIUM SERPL-MCNC: 9.1 MG/DL (ref 8.7–10.5)
CHLORIDE SERPL-SCNC: 105 MMOL/L (ref 95–110)
CO2 SERPL-SCNC: 25 MMOL/L (ref 23–29)
CREAT SERPL-MCNC: 0.7 MG/DL (ref 0.5–1.4)
DIFFERENTIAL METHOD: ABNORMAL
EOSINOPHIL # BLD AUTO: 0.2 K/UL (ref 0–0.5)
EOSINOPHIL NFR BLD: 1.9 % (ref 0–8)
ERYTHROCYTE [DISTWIDTH] IN BLOOD BY AUTOMATED COUNT: 13.7 % (ref 11.5–14.5)
EST. GFR  (NO RACE VARIABLE): >60 ML/MIN/1.73 M^2
GLUCOSE SERPL-MCNC: 109 MG/DL (ref 70–110)
HCT VFR BLD AUTO: 47.9 % (ref 40–54)
HCV AB SERPL QL IA: NORMAL
HGB BLD-MCNC: 16 G/DL (ref 14–18)
HIV 1+2 AB+HIV1 P24 AG SERPL QL IA: NORMAL
IMM GRANULOCYTES # BLD AUTO: 0.06 K/UL (ref 0–0.04)
IMM GRANULOCYTES NFR BLD AUTO: 0.5 % (ref 0–0.5)
LYMPHOCYTES # BLD AUTO: 1.8 K/UL (ref 1–4.8)
LYMPHOCYTES NFR BLD: 16.3 % (ref 18–48)
MCH RBC QN AUTO: 30.4 PG (ref 27–31)
MCHC RBC AUTO-ENTMCNC: 33.4 G/DL (ref 32–36)
MCV RBC AUTO: 91 FL (ref 82–98)
MONOCYTES # BLD AUTO: 1.1 K/UL (ref 0.3–1)
MONOCYTES NFR BLD: 9.6 % (ref 4–15)
NEUTROPHILS # BLD AUTO: 7.8 K/UL (ref 1.8–7.7)
NEUTROPHILS NFR BLD: 71.4 % (ref 38–73)
NRBC BLD-RTO: 0 /100 WBC
PLATELET # BLD AUTO: 275 K/UL (ref 150–450)
PMV BLD AUTO: 10 FL (ref 9.2–12.9)
POTASSIUM SERPL-SCNC: 4.1 MMOL/L (ref 3.5–5.1)
PROT SERPL-MCNC: 6.9 G/DL (ref 6–8.4)
RBC # BLD AUTO: 5.26 M/UL (ref 4.6–6.2)
SODIUM SERPL-SCNC: 138 MMOL/L (ref 136–145)
WBC # BLD AUTO: 10.92 K/UL (ref 3.9–12.7)

## 2022-12-25 PROCEDURE — 86850 RBC ANTIBODY SCREEN: CPT | Performed by: EMERGENCY MEDICINE

## 2022-12-25 PROCEDURE — 85025 COMPLETE CBC W/AUTO DIFF WBC: CPT | Performed by: EMERGENCY MEDICINE

## 2022-12-25 PROCEDURE — 87389 HIV-1 AG W/HIV-1&-2 AB AG IA: CPT | Performed by: PHYSICIAN ASSISTANT

## 2022-12-25 PROCEDURE — 86803 HEPATITIS C AB TEST: CPT | Performed by: PHYSICIAN ASSISTANT

## 2022-12-25 PROCEDURE — 80053 COMPREHEN METABOLIC PANEL: CPT | Performed by: EMERGENCY MEDICINE

## 2022-12-25 PROCEDURE — 25000003 PHARM REV CODE 250: Performed by: EMERGENCY MEDICINE

## 2022-12-25 RX ORDER — BACITRACIN ZINC 500 UNIT/G
OINTMENT (GRAM) TOPICAL 2 TIMES DAILY
Qty: 30 G | Refills: 0 | Status: SHIPPED | OUTPATIENT
Start: 2022-12-25 | End: 2023-01-04

## 2022-12-25 RX ORDER — BACITRACIN 500 [USP'U]/G
OINTMENT TOPICAL
Status: COMPLETED | OUTPATIENT
Start: 2022-12-25 | End: 2022-12-25

## 2022-12-25 RX ADMIN — BACITRACIN: 500 OINTMENT TOPICAL at 02:12

## 2022-12-25 NOTE — CONSULTS
[facility] Dept  Neurosurgery  Consult Note    Subjective:     History of Present Illness: 67 M PMHx MS, lives in nursing home, presents after fall out of electric chair while rolling down ramp out of a van. He hit head, no LOC. At the time of presentation, he is in good spirits, no headaches, at baseline neurologic status. Of note he has very minimal movement in extremities at baseline due to MS.      Post-Op Info:  * No surgery found *         Past Medical History:   Diagnosis Date    Acute urinary retention     Acute UTI     treated twice in past three weeks with antibx per patient-since indwelling catheter    Back problem     herniated disc-lumbar region    GERD (gastroesophageal reflux disease)     Hypertension     130s/80s    MS (multiple sclerosis)     Myocardial infarction     6/2020, has 1 stent    Neck stiffness     Neurogenic bladder     has indwelling catheter now    Sleep disturbance     Snoring     Visual impairment        Past Surgical History:   Procedure Laterality Date    COLONOSCOPY N/A 1/31/2019    Procedure: COLONOSCOPY;  Surgeon: Luis James MD;  Location: [facility];  Service: Endoscopy;  Laterality: N/A;    COLONOSCOPY N/A 5/2/2021    Procedure: COLONOSCOPY;  Surgeon: Nancy Flores MD;  Location: [facility] ENDO;  Service: Endoscopy;  Laterality: N/A;    CORONARY ANGIOPLASTY WITH STENT PLACEMENT  06/24/2019    proximal LAD     FINGER SURGERY      LASER OF PROSTATE W/ GREEN LIGHT PVP      RECTAL BOTOX INJECTION      SPINAL FUSION         Social History     Socioeconomic History    Marital status:      Spouse name: Bindu German    Number of children: 2    Highest education level: Some college, no degree   Tobacco Use    Smoking status: Never    Smokeless tobacco: Never   Substance and Sexual Activity    Alcohol use: Yes     Alcohol/week: 1.0 standard drink     Types: 1 Cans of beer per week     Comment: occasionally drinks    Drug use: No     Sexual activity: Not Currently   Social History Narrative    65  y/o male, lives with home in [facility].     Has 2 daughters, Betty lives in [facility] and Lien lives in [place].         [employer]: Denominational            Family History   Problem Relation Age of Onset    Dementia Mother     Parkinsonism Father     Heart block Father     Emphysema Father     No Known Problems Sister     Hypertension Brother     Anesthesia problems Neg Hx        Review of patient's allergies indicates:  No Known Allergies      Medications:  Continuous Infusions:  Scheduled Meds:   bacitracin   Topical (Top) ED 1 Time     PRN Meds:     Review of Systems    Review of Systems   Constitutional:  Negative for fatigue and fever.   Respiratory:  Negative for shortness of breath.    Cardiovascular:  Negative for chest pain.   Gastrointestinal:  Negative for nausea and vomiting.   Genitourinary:  Negative for difficulty urinating.   Musculoskeletal: Negative for back pain and neck pain.   Neurological:  Negative for headaches. Negative for dizziness, seizures, syncope, weakness and numbness.     Objective:     Weight: 82.1 kg (181 lb)  Body mass index is 27.52 kg/m².  Vital Signs (Most Recent):  Temp: 97.5 °F (36.4 °C) (12/25/22 0030)  Pulse: 71 (12/25/22 0029)  Resp: 16 (12/25/22 0029)  BP: 117/62 (12/25/22 0029)  SpO2: 96 % (12/25/22 0029) Vital Signs (24h Range):  Temp:  [97.4 °F (36.3 °C)-97.5 °F (36.4 °C)] 97.5 °F (36.4 °C)  Pulse:  [71-77] 71  Resp:  [16-18] 16  SpO2:  [96 %] 96 %  BP: (117-150)/(62-70) 117/62                          Physical Exam    Neurosurgery Physical Exam    Physical Exam:    Constitutional: No distress.     HEENT: atraumatic/normocephalic    Cardiovascular: Regular rhythm.     Pulm: aerating well, saturating well    Abdominal: Soft.     Psych/Behavior: He is alert.     E4V5M6  AOx3  PERRL  EOMI  Face Symmetric  Right no movement (baseline from MS)  Left wiggles toes and antigravity at  shoulder (baseline from MS)      Significant Labs:  No results for input(s): GLU, NA, K, CL, CO2, BUN, CREATININE, CALCIUM, MG in the last 48 hours.  Recent Labs   Lab 12/25/22  0011   WBC 10.92   HGB 16.0   HCT 47.9        No results for input(s): LABPT, INR, APTT in the last 48 hours.  Microbiology Results (last 7 days)       ** No results found for the last 168 hours. **          All pertinent labs from the last 24 hours have been reviewed.    Significant Diagnostics:  I have reviewed and interpreted all pertinent imaging results/findings within the past 24 hours.    Assessment/Plan:     Subdural hematoma  67 M PMHx MS, lives in nursing home, presents after fall out of electric chair while rolling down ramp out of a van. He takes brillinta for cardiac stent 4 years ago.     CTH with focal 6 mm right frontal convexity SDH, no mass effect or shift.  CT C spine without fracture.     -- rpt scan 6 hours  -- no need to reverse brillinta given bleed is very small  -- If repeat scan stable, ok to discharge home  -- hold brillinta for 1 week         Melissa Archuleta MD  Neurosurgery  [facility] - [facility]

## 2022-12-25 NOTE — DISCHARGE INSTRUCTIONS
Do not take your Brilinta for 1 week. You may restart the medication on 1/1/23. Return to the ER with any new numbness or weakness, confusion, or any other concerns.

## 2022-12-25 NOTE — ED NOTES
Snacks provided for pt. Pt adjusted in bed. Pt's wife at bedside to assist in feeding. Reminded pt to call when needing pericare/ brief changed.

## 2022-12-25 NOTE — ED PROVIDER NOTES
History:  Michael German is a 67 y.o. male who presents to the ED with Fall (From [facility]. Fell face first out of wheelchair. Abrasion noted to R eye. Arrives in C collar. -LOC. + blood thinners. Hx of MS)    Described as 67-year-old male with a history of MS, wheelchair-bound presenting to the emergency department after a fall.  He reports he was going down a ramp when he slid out from underneath the seatbelt of his power wheelchair and subsequently fell forward, striking his head.  No LOC, on Brilinta.  Reportedly felt dizzy immediately after the fall, now has a sore type pain in his right head and diffuse neck, constant, nonradiating, with associated abrasion.  He denies any nausea or vomiting.    Review of Systems: All systems reviewed and are negative except as per history of present illness.  Constitutional: Negative for fever.   HENT: Negative for congestion.    Respiratory: Negative for shortness of breath.    Cardiovascular: Negative for chest pain.   Gastrointestinal: Negative for abdominal pain.   Genitourinary: Negative for dysuria.   Musculoskeletal: Negative for myalgias. +neck pain  Skin: Negative for rash. +facial abrasion  Neurological: Negative for focal weakness. +dizzy, +headache  Psychiatric/Behavioral: Negative for memory loss.     Medications:   Previous Medications    ACETAMINOPHEN (TYLENOL) 325 MG TABLET    Take 325 mg by mouth every 6 (six) hours as needed for Pain (2 TAB).    ASCORBIC ACID, VITAMIN C, (VITAMIN C) 1000 MG TABLET    Take 500 mg by mouth 2 (two) times a day.    ATORVASTATIN (LIPITOR) 20 MG TABLET    Take 20 mg by mouth every evening.    B COMPLEX VITAMINS TABLET    Take 1 tablet by mouth once daily.    BACLOFEN (LIORESAL) 10 MG TABLET    Take 5 mg by mouth 2 (two) times daily.    BRILINTA 90 MG TABLET    Take 1 tablet (90 mg total) by mouth every 12 (twelve) hours.    CALCIUM CARBONATE (TUMS) 200 MG CALCIUM (500 MG) CHEWABLE TABLET    Take 2 tablets by mouth  every 8 (eight) hours as needed for Heartburn.    CARVEDILOL (COREG) 6.25 MG TABLET    Take 1 tablet (6.25 mg total) by mouth 2 (two) times daily.    DOCUSATE SODIUM (COLACE) 100 MG CAPSULE    Take 100 mg by mouth nightly.    GABAPENTIN (NEURONTIN) 100 MG CAPSULE    Take 1 capsule (100 mg total) by mouth 3 (three) times daily.    GUAIFENESIN 200 MG TABLET    Take 200 mg by mouth every 6 (six) hours as needed. 200mg/10ml    LACTULOSE (CHRONULAC) 10 GRAM/15 ML SOLUTION    SMARTSIG:Milliliter(s) By Mouth    LOSARTAN (COZAAR) 100 MG TABLET    Take 1 tablet (100 mg total) by mouth once daily.    MIRABEGRON (MYRBETRIQ) 50 MG TB24    Take 50 mg by mouth once daily.    NITROGLYCERIN (NITROSTAT) 0.4 MG SL TABLET    Place 1 tablet (0.4 mg total) under the tongue every 5 (five) minutes as needed for Chest pain. DO NOT EXCEED A TOTAL OF 3 DOSES IN 15 MINUTES    OMEPRAZOLE (PRILOSEC) 20 MG CAPSULE    Take 20 mg by mouth once daily.    PANTOPRAZOLE (PROTONIX) 40 MG TABLET    Take 1 tablet (40 mg total) by mouth once daily.    POLYETHYLENE GLYCOL (GLYCOLAX) 17 GRAM PWPK    Take 17 g by mouth once daily. 17 GMS IN 8 OZ OF WATER PO QD PRN CONSTIPATION    PYRIDOXINE, VITAMIN B6, (B-6) 100 MG TAB    Take 100 mg by mouth once daily.    SENNA (SENOKOT) 8.6 MG TABLET    Take 1 tablet by mouth once daily.    SODIUM CHLORIDE (OCEAN) 0.65 % NASAL SPRAY    2 sprays by Nasal route every 8 (eight) hours as needed for Congestion.       PMH:   Past Medical History:   Diagnosis Date    Acute urinary retention     Acute UTI     treated twice in past three weeks with antibx per patient-since indwelling catheter    Back problem     herniated disc-lumbar region    GERD (gastroesophageal reflux disease)     Hypertension     130s/80s    MS (multiple sclerosis)     Myocardial infarction     6/2020, has 1 stent    Neck stiffness     Neurogenic bladder     has indwelling catheter now    Sleep disturbance     Snoring     Visual impairment      PSH:   Past  Surgical History:   Procedure Laterality Date    COLONOSCOPY N/A 1/31/2019    Procedure: COLONOSCOPY;  Surgeon: Luis James MD;  Location: [facility] ENDO;  Service: Endoscopy;  Laterality: N/A;    COLONOSCOPY N/A 5/2/2021    Procedure: COLONOSCOPY;  Surgeon: Nancy Flores MD;  Location: [facility] ENDO;  Service: Endoscopy;  Laterality: N/A;    CORONARY ANGIOPLASTY WITH STENT PLACEMENT  06/24/2019    proximal LAD     FINGER SURGERY      LASER OF PROSTATE W/ GREEN LIGHT PVP      RECTAL BOTOX INJECTION      SPINAL FUSION       Allergies: He has No Known Allergies.  Social History: Marital Status: . He  reports that he has never smoked. He has never used smokeless tobacco.. He  reports current alcohol use of about 1.0 standard drink per week..       Exam:  VITAL SIGNS:   Vitals:    12/24/22 2048 12/25/22 0029 12/25/22 0030 12/25/22 0233   BP:  117/62  125/65   Pulse:  71  67   Resp:  16  14   Temp:   97.5 °F (36.4 °C) 97.5 °F (36.4 °C)   TempSrc:       SpO2:  96%  95%   Weight: 82.1 kg (181 lb)        Const: Awake and alert, NAD   Head: Abrasion R temporal region, no lacerations  Eyes: Normal Conjunctiva  ENT: Normal External Ears, Nose and Mouth.  Neck: C-collar in place, diffuse midline TTP, no stepoffs  Back: No midline TTP, no stepoffs  Resp: Normal respiratory effort, No distress  Cardio: Equal and intact distal pulses  Abd: Soft, non tender, non distended.  Skin: abrasion to R temporal region, no lacerations  Ext: No cyanosis, or edema, no deformities  Neur: Awake and alert, 4/5 strength LUE, 3/5 RUE, 2/5 LLE, 0/5 RLE (baseline per patient with hx MS)  Psych: Normal Mood and Affect    Data:  Results for orders placed or performed during the hospital encounter of 12/24/22   HIV 1/2 Ag/Ab (4th Gen)   Result Value Ref Range    HIV 1/2 Ag/Ab Non-reactive Non-reactive   Hepatitis C Antibody   Result Value Ref Range    Hepatitis C Ab Non-reactive Non-reactive   CBC auto differential   Result Value Ref Range     WBC 10.92 3.90 - 12.70 K/uL    RBC 5.26 4.60 - 6.20 M/uL    Hemoglobin 16.0 14.0 - 18.0 g/dL    Hematocrit 47.9 40.0 - 54.0 %    MCV 91 82 - 98 fL    MCH 30.4 27.0 - 31.0 pg    MCHC 33.4 32.0 - 36.0 g/dL    RDW 13.7 11.5 - 14.5 %    Platelets 275 150 - 450 K/uL    MPV 10.0 9.2 - 12.9 fL    Immature Granulocytes 0.5 0.0 - 0.5 %    Gran # (ANC) 7.8 (H) 1.8 - 7.7 K/uL    Immature Grans (Abs) 0.06 (H) 0.00 - 0.04 K/uL    Lymph # 1.8 1.0 - 4.8 K/uL    Mono # 1.1 (H) 0.3 - 1.0 K/uL    Eos # 0.2 0.0 - 0.5 K/uL    Baso # 0.03 0.00 - 0.20 K/uL    nRBC 0 0 /100 WBC    Gran % 71.4 38.0 - 73.0 %    Lymph % 16.3 (L) 18.0 - 48.0 %    Mono % 9.6 4.0 - 15.0 %    Eosinophil % 1.9 0.0 - 8.0 %    Basophil % 0.3 0.0 - 1.9 %    Differential Method Automated    Comprehensive metabolic panel   Result Value Ref Range    Sodium 138 136 - 145 mmol/L    Potassium 4.1 3.5 - 5.1 mmol/L    Chloride 105 95 - 110 mmol/L    CO2 25 23 - 29 mmol/L    Glucose 109 70 - 110 mg/dL    BUN 13 8 - 23 mg/dL    Creatinine 0.7 0.5 - 1.4 mg/dL    Calcium 9.1 8.7 - 10.5 mg/dL    Total Protein 6.9 6.0 - 8.4 g/dL    Albumin 3.8 3.5 - 5.2 g/dL    Total Bilirubin 0.6 0.1 - 1.0 mg/dL    Alkaline Phosphatase 125 55 - 135 U/L    AST 28 10 - 40 U/L    ALT 48 (H) 10 - 44 U/L    Anion Gap 8 8 - 16 mmol/L    eGFR >60.0 >60 mL/min/1.73 m^2   Type & Screen   Result Value Ref Range    Group & Rh O POS     Indirect Rima NEG      Imaging Results              X-Ray Knee 3 View Right (Final result)  Result time 12/25/22 00:23:06      Final result by Ehsan Ray MD (12/25/22 00:23:06)                   Impression:      Mild focal cortical irregularity involving the superior pole of the patella which may represent a minimally displaced fracture.  Moderate overlying prepatellar soft tissue edema and small suprapatellar joint effusion.  Correlation with point tenderness advised.      Electronically signed by: Ehsan Ray MD  Date:    12/25/2022  Time:    00:23                Narrative:    EXAMINATION:  XR KNEE 3 VIEW RIGHT    CLINICAL HISTORY:  Pain in unspecified knee    TECHNIQUE:  AP, lateral, and Merchant views of the right knee were performed.    COMPARISON:  None    FINDINGS:  There is diffuse osteopenia.  There is mild focal cortical irregularity involving the superior pole of the patella which may represent a minimally displaced fracture.  There is moderate overlying soft tissue edema.  There is a small suprapatellar joint effusion present.  The remaining visualized osseous structures appear intact without evidence of dislocation.  There is mild narrowing of the medial and lateral tibiofemoral compartments.                                        CT Head Without Contrast (Final result)  Result time 12/24/22 23:50:18      Final result by Alexsander Chang MD (12/24/22 23:50:18)                   Impression:      Small subdural hematoma over the right frontal convexity measuring 6 mm in largest thickness.  No evidence of brain parenchymal edema or contusion or significant mass effect.    Scalp laceration/abrasion over the right temporal region without foreign body or significant hematoma.    Right mastoid middle ear opacifications suggesting otitis media and mastoiditis.    No acute findings evident the cervical spine in patient with 2 level fusion.    Findings were communicated to the emergency room physician Steph Rosario MD via [email or web address] secure chat at the time this dictation with confirmation of receipt.    This report was flagged in Epic as abnormal.      Electronically signed by: Alexsander Chang  Date:    12/24/2022  Time:    23:50               Narrative:    EXAMINATION:  CT HEAD WITHOUT CONTRAST; CT CERVICAL SPINE WITHOUT CONTRAST    CLINICAL HISTORY:  Head trauma, minor (Age >= 65y);; Neck trauma (Age >= 65y);    TECHNIQUE:  Axial imaging the cranium was performed before and after administration of 100 cc of IV contrast Omipaque 350 mg. Reformatted coronal and  sagittal sequences were taken.    COMPARISON:  None    FINDINGS:  The brain is intact.  There are no enhancing brain abnormalities.  The intracranial vascular structures are normal.  There is a small subdural hematoma over the right frontal convexity with the largest component measuring 6 mm in thickness..    The orbits are intact.  The paranasal sinuses appear normal.  There is a complete opacification of the middle ear and mastoids on the right.  Tympanic membrane appears retracted.  Soft tissue swelling with irregularity of the skin suggests abrasion of the temporal scalp on the right.  No foreign body or fracture is evident.    Cervical spine is normally aligned with anterior cervical fusion with plate and spacer at C 3 4 with excellent fusion.  There is no displaced fracture or subluxation.  The C5-6 listhesis is stable since 2016    The surrounding visceral spaces and soft tissues appear intact without evidence of hematoma or lymph node enlargement.  The lung apices are clear.                                        CT Cervical Spine Without Contrast (Final result)  Result time 12/24/22 23:50:18      Final result by Alexsander Chang MD (12/24/22 23:50:18)                   Impression:      Small subdural hematoma over the right frontal convexity measuring 6 mm in largest thickness.  No evidence of brain parenchymal edema or contusion or significant mass effect.    Scalp laceration/abrasion over the right temporal region without foreign body or significant hematoma.    Right mastoid middle ear opacifications suggesting otitis media and mastoiditis.    No acute findings evident the cervical spine in patient with 2 level fusion.    Findings were communicated to the emergency room physician Steph Rosario MD via [employer] secure chat at the time this dictation with confirmation of receipt.    This report was flagged in Epic as abnormal.      Electronically signed by: Alexsander  Bernardo  Date:    2022  Time:    23:50               Narrative:    EXAMINATION:  CT HEAD WITHOUT CONTRAST; CT CERVICAL SPINE WITHOUT CONTRAST    CLINICAL HISTORY:  Head trauma, minor (Age >= 65y);; Neck trauma (Age >= 65y);    TECHNIQUE:  Axial imaging the cranium was performed before and after administration of 100 cc of IV contrast Omipaque 350 mg. Reformatted coronal and sagittal sequences were taken.    COMPARISON:  None    FINDINGS:  The brain is intact.  There are no enhancing brain abnormalities.  The intracranial vascular structures are normal.  There is a small subdural hematoma over the right frontal convexity with the largest component measuring 6 mm in thickness..    The orbits are intact.  The paranasal sinuses appear normal.  There is a complete opacification of the middle ear and mastoids on the right.  Tympanic membrane appears retracted.  Soft tissue swelling with irregularity of the skin suggests abrasion of the temporal scalp on the right.  No foreign body or fracture is evident.    Cervical spine is normally aligned with anterior cervical fusion with plate and spacer at C 3 4 with excellent fusion.  There is no displaced fracture or subluxation.  The C5-6 listhesis is stable since 2016    The surrounding visceral spaces and soft tissues appear intact without evidence of hematoma or lymph node enlargement.  The lung apices are clear.                                    Labs & Imaging studies were reviewed independently by me.     Medical Decision Makin-year-old male presenting to the emergency department after a mechanical fall out of his power wheelchair.  He is neurologically intact at his baseline, which is weak, worse on the right than the left due to his history of MS.  On examination, he does have an abrasion on his right temporal scalp, though no lacerations or sutures needed.  Bacitracin was applied.  CT head shows a small right frontal subdural hematoma.  Neurosurgery was  consulted, recommend 6 hour stability scan, no need to reverse Brilinta.  If stable, okay to discharge, hold Brilinta x1 week.  Repeat head CT shows stable small SDH. Per neurosurgery, OK to DC. Strict return precautions discussed, and patient is agreeable with the plan.     Regarding his knee, x-ray is negative for fracture or dislocation.    Critical Care Time: 55 minutes  Treatments/Evaluations: Close monitoring and treatment of unstable vital signs, cardiorespiratory, and neurologic status, while maintaining tight balance of fluid, respiratory, and cardiac interventions. This time includes discussing the case with the patient and the patients family. This time does not include all procedures stated elsewhere in this record. This time also includes reviewing old records, labs and radiological studies. This time includes examining and re-examining the patient. Additionally, this time also includes arranging care with admitting and consulting physicians.     Clinical Impression:  1. Subdural hematoma due to concussion, with unknown loss of consciousness status, initial encounter    2. Acute knee pain    3. Contusion of right knee, initial encounter    4. History of multiple sclerosis               Steph Rosario MD  12/25/22 0606

## 2022-12-25 NOTE — ASSESSMENT & PLAN NOTE
67 M PMHx MS, lives in nursing home, presents after fall out of electric chair while rolling down ramp out of a van. He takes brillinta for cardiac stent 4 years ago.     CTH with focal 6 mm right frontal convexity SDH, no mass effect or shift.  CT C spine without fracture.     -- rpt scan 6 hours  -- no need to reverse brillinta given bleed is very small  -- If repeat scan stable, ok to discharge home  -- hold brillinta for 1 week

## 2022-12-25 NOTE — CARE UPDATE
Chief Complaint   Patient presents with     Wellness Visit     Patient comes in for a wellness exam.         Shashi Jimenez MA on 5/25/2022 at 10:43 AM   Rpt CT scan demonstrates stable bleed.   OK for d/c home  NSGY will sign off    Melissa MCDANIELGY PGY2

## 2022-12-25 NOTE — SUBJECTIVE & OBJECTIVE
Past Medical History:   Diagnosis Date    Acute urinary retention     Acute UTI     treated twice in past three weeks with antibx per patient-since indwelling catheter    Back problem     herniated disc-lumbar region    GERD (gastroesophageal reflux disease)     Hypertension     130s/80s    MS (multiple sclerosis)     Myocardial infarction     6/2020, has 1 stent    Neck stiffness     Neurogenic bladder     has indwelling catheter now    Sleep disturbance     Snoring     Visual impairment        Past Surgical History:   Procedure Laterality Date    COLONOSCOPY N/A 1/31/2019    Procedure: COLONOSCOPY;  Surgeon: Luis Jaems MD;  Location: [facility] ENDO;  Service: Endoscopy;  Laterality: N/A;    COLONOSCOPY N/A 5/2/2021    Procedure: COLONOSCOPY;  Surgeon: Nancy Flores MD;  Location: [facility] ENDO;  Service: Endoscopy;  Laterality: N/A;    CORONARY ANGIOPLASTY WITH STENT PLACEMENT  06/24/2019    proximal LAD     FINGER SURGERY      LASER OF PROSTATE W/ GREEN LIGHT PVP      RECTAL BOTOX INJECTION      SPINAL FUSION         Social History     Socioeconomic History    Marital status:      Spouse name: Bindu German    Number of children: 2    Highest education level: Some college, no degree   Tobacco Use    Smoking status: Never    Smokeless tobacco: Never   Substance and Sexual Activity    Alcohol use: Yes     Alcohol/week: 1.0 standard drink     Types: 1 Cans of beer per week     Comment: occasionally drinks    Drug use: No    Sexual activity: Not Currently   Social History Narrative    65  y/o male, lives with home in one [facility].     Has 2 daughters, Betty lives in [facility] and Lien lives in [place].         Adventism: Christian            Family History   Problem Relation Age of Onset    Dementia Mother     Parkinsonism Father     Heart block Father     Emphysema Father     No Known Problems Sister     Hypertension Brother     Anesthesia problems Neg Hx        Review of patient's  allergies indicates:  No Known Allergies      Medications:  Continuous Infusions:  Scheduled Meds:   bacitracin   Topical (Top) ED 1 Time     PRN Meds:     Review of Systems    Review of Systems   Constitutional:  Negative for fatigue and fever.   Respiratory:  Negative for shortness of breath.    Cardiovascular:  Negative for chest pain.   Gastrointestinal:  Negative for nausea and vomiting.   Genitourinary:  Negative for difficulty urinating.   Musculoskeletal: Negative for back pain and neck pain.   Neurological:  Negative for headaches. Negative for dizziness, seizures, syncope, weakness and numbness.     Objective:     Weight: 82.1 kg (181 lb)  Body mass index is 27.52 kg/m².  Vital Signs (Most Recent):  Temp: 97.5 °F (36.4 °C) (12/25/22 0030)  Pulse: 71 (12/25/22 0029)  Resp: 16 (12/25/22 0029)  BP: 117/62 (12/25/22 0029)  SpO2: 96 % (12/25/22 0029) Vital Signs (24h Range):  Temp:  [97.4 °F (36.3 °C)-97.5 °F (36.4 °C)] 97.5 °F (36.4 °C)  Pulse:  [71-77] 71  Resp:  [16-18] 16  SpO2:  [96 %] 96 %  BP: (117-150)/(62-70) 117/62                          Physical Exam    Neurosurgery Physical Exam    Physical Exam:    Constitutional: No distress.     HEENT: atraumatic/normocephalic    Cardiovascular: Regular rhythm.     Pulm: aerating well, saturating well    Abdominal: Soft.     Psych/Behavior: He is alert.     E4V5M6  AOx3  PERRL  EOMI  Face Symmetric  Right no movement (baseline from MS)  Left wiggles toes and antigravity at shoulder (baseline from MS)      Significant Labs:  No results for input(s): GLU, NA, K, CL, CO2, BUN, CREATININE, CALCIUM, MG in the last 48 hours.  Recent Labs   Lab 12/25/22  0011   WBC 10.92   HGB 16.0   HCT 47.9        No results for input(s): LABPT, INR, APTT in the last 48 hours.  Microbiology Results (last 7 days)       ** No results found for the last 168 hours. **          All pertinent labs from the last 24 hours have been reviewed.    Significant Diagnostics:  I have  reviewed and interpreted all pertinent imaging results/findings within the past 24 hours.

## 2022-12-25 NOTE — ED TRIAGE NOTES
Michael German, a 67 y.o. male presents to the ED w/ complaint of mechanical fall out of wheelchair and hit head. -LOC, +thinners. Hx of MS    Triage note:  Chief Complaint   Patient presents with    Fall     From [facility]. Fell face first out of wheelchair. Abrasion noted to R eye. Arrives in C collar. -LOC. + blood thinners. Hx of MS     Review of patient's allergies indicates:  No Known Allergies  Past Medical History:   Diagnosis Date    Acute urinary retention     Acute UTI     treated twice in past three weeks with antibx per patient-since indwelling catheter    Back problem     herniated disc-lumbar region    GERD (gastroesophageal reflux disease)     Hypertension     130s/80s    MS (multiple sclerosis)     Myocardial infarction     6/2020, has 1 stent    Neck stiffness     Neurogenic bladder     has indwelling catheter now    Sleep disturbance     Snoring     Visual impairment

## 2022-12-25 NOTE — HPI
67 M PMHx MS, lives in nursing home, presents after fall out of electric chair while rolling down ramp out of a van. He hit head, no LOC. At the time of presentation, he is in good spirits, no headaches, at baseline neurologic status. Of note he has very minimal movement in extremities at baseline due to MS.

## 2022-12-27 ENCOUNTER — TELEPHONE (OUTPATIENT)
Dept: NEUROSURGERY | Facility: CLINIC | Age: 67
End: 2022-12-27
Payer: MEDICARE

## 2022-12-27 DIAGNOSIS — S06.5XAA SUBDURAL HEMATOMA: Primary | ICD-10-CM

## 2022-12-27 NOTE — TELEPHONE ENCOUNTER
Returned pt wife call and schedule a f/u with Kylie for 1/12/22 @230pm [facility] scheduled for 1 pm Pt wife VU  ----- Message from Gabrielle Cuba CMA sent at 12/27/2022  3:03 PM CST -----  Regarding: Appt request  Contact: [phone]  Mrs. German calling to schedule a f/u appt. Pt fell on 12/24/22. Please call to schedule pt.     Thank you

## 2023-01-04 ENCOUNTER — TELEPHONE (OUTPATIENT)
Dept: PSYCHIATRY | Facility: CLINIC | Age: 68
End: 2023-01-04
Payer: MEDICARE

## 2023-01-04 NOTE — TELEPHONE ENCOUNTER
Followed UP with [employer] in regards to VM left for prior authorization for outpatient PT. Also, followed up with nursing home and Michael's wife to see who initiated the order for outpatient PT as Michael is living in a nursing home and insurance is unlikely to cover. Neither wife nor [facility] knew where order originated. [facility] could also not tell me whether he was currently receiving maintenance therapy. They will follow up with this writer once they speak with his nurse.

## 2023-01-12 ENCOUNTER — HOSPITAL ENCOUNTER (OUTPATIENT)
Dept: RADIOLOGY | Facility: HOSPITAL | Age: 68
Discharge: HOME | End: 2023-01-12
Attending: PHYSICIAN ASSISTANT
Payer: MEDICARE

## 2023-01-12 ENCOUNTER — OFFICE VISIT (OUTPATIENT)
Dept: NEUROSURGERY | Facility: CLINIC | Age: 68
End: 2023-01-12
Payer: MEDICARE

## 2023-01-12 VITALS — DIASTOLIC BLOOD PRESSURE: 68 MMHG | HEART RATE: 69 BPM | SYSTOLIC BLOOD PRESSURE: 112 MMHG | TEMPERATURE: 98 F

## 2023-01-12 DIAGNOSIS — S06.5XAA SUBDURAL HEMATOMA: Primary | ICD-10-CM

## 2023-01-12 DIAGNOSIS — S06.5XAA SUBDURAL HEMATOMA: ICD-10-CM

## 2023-01-12 PROCEDURE — 4010F ACE/ARB THERAPY RXD/TAKEN: CPT | Mod: CPTII,S$GLB,, | Performed by: PHYSICIAN ASSISTANT

## 2023-01-12 PROCEDURE — 99213 OFFICE O/P EST LOW 20 MIN: CPT | Mod: S$GLB,,, | Performed by: PHYSICIAN ASSISTANT

## 2023-01-12 PROCEDURE — 1126F AMNT PAIN NOTED NONE PRSNT: CPT | Mod: CPTII,S$GLB,, | Performed by: PHYSICIAN ASSISTANT

## 2023-01-12 PROCEDURE — 3288F PR FALLS RISK ASSESSMENT DOCUMENTED: ICD-10-PCS | Mod: CPTII,S$GLB,, | Performed by: PHYSICIAN ASSISTANT

## 2023-01-12 PROCEDURE — 3074F PR MOST RECENT SYSTOLIC BLOOD PRESSURE < 130 MM HG: ICD-10-PCS | Mod: CPTII,S$GLB,, | Performed by: PHYSICIAN ASSISTANT

## 2023-01-12 PROCEDURE — 3074F SYST BP LT 130 MM HG: CPT | Mod: CPTII,S$GLB,, | Performed by: PHYSICIAN ASSISTANT

## 2023-01-12 PROCEDURE — 3078F PR MOST RECENT DIASTOLIC BLOOD PRESSURE < 80 MM HG: ICD-10-PCS | Mod: CPTII,S$GLB,, | Performed by: PHYSICIAN ASSISTANT

## 2023-01-12 PROCEDURE — 1126F PR PAIN SEVERITY QUANTIFIED, NO PAIN PRESENT: ICD-10-PCS | Mod: CPTII,S$GLB,, | Performed by: PHYSICIAN ASSISTANT

## 2023-01-12 PROCEDURE — 99999 PR PBB SHADOW E&M-EST. PATIENT-LVL III: CPT | Mod: PBBFAC,,, | Performed by: PHYSICIAN ASSISTANT

## 2023-01-12 PROCEDURE — 3078F DIAST BP <80 MM HG: CPT | Mod: CPTII,S$GLB,, | Performed by: PHYSICIAN ASSISTANT

## 2023-01-12 PROCEDURE — 70450 CT HEAD WITHOUT CONTRAST: ICD-10-PCS | Mod: 26,,, | Performed by: RADIOLOGY

## 2023-01-12 PROCEDURE — 1101F PT FALLS ASSESS-DOCD LE1/YR: CPT | Mod: CPTII,S$GLB,, | Performed by: PHYSICIAN ASSISTANT

## 2023-01-12 PROCEDURE — 1159F PR MEDICATION LIST DOCUMENTED IN MEDICAL RECORD: ICD-10-PCS | Mod: CPTII,S$GLB,, | Performed by: PHYSICIAN ASSISTANT

## 2023-01-12 PROCEDURE — 4010F PR ACE/ARB THEARPY RXD/TAKEN: ICD-10-PCS | Mod: CPTII,S$GLB,, | Performed by: PHYSICIAN ASSISTANT

## 2023-01-12 PROCEDURE — 70450 CT HEAD/BRAIN W/O DYE: CPT | Mod: TC

## 2023-01-12 PROCEDURE — 1159F MED LIST DOCD IN RCRD: CPT | Mod: CPTII,S$GLB,, | Performed by: PHYSICIAN ASSISTANT

## 2023-01-12 PROCEDURE — 70450 CT HEAD/BRAIN W/O DYE: CPT | Mod: 26,,, | Performed by: RADIOLOGY

## 2023-01-12 PROCEDURE — 1101F PR PT FALLS ASSESS DOC 0-1 FALLS W/OUT INJ PAST YR: ICD-10-PCS | Mod: CPTII,S$GLB,, | Performed by: PHYSICIAN ASSISTANT

## 2023-01-12 PROCEDURE — 3288F FALL RISK ASSESSMENT DOCD: CPT | Mod: CPTII,S$GLB,, | Performed by: PHYSICIAN ASSISTANT

## 2023-01-12 PROCEDURE — 99999 PR PBB SHADOW E&M-EST. PATIENT-LVL III: ICD-10-PCS | Mod: PBBFAC,,, | Performed by: PHYSICIAN ASSISTANT

## 2023-01-12 PROCEDURE — 99213 PR OFFICE/OUTPT VISIT, EST, LEVL III, 20-29 MIN: ICD-10-PCS | Mod: S$GLB,,, | Performed by: PHYSICIAN ASSISTANT

## 2023-01-12 RX ORDER — CEPHALEXIN 250 MG/1
CAPSULE ORAL
COMMUNITY
Start: 2022-11-30 | End: 2023-05-01

## 2023-01-12 NOTE — PROGRESS NOTES
[facility] - [facility] 8th Fl  Neurosurgery    SUBJECTIVE:     History of Present Illness:  Michael German is a 67 y.o. male with hx of MS who presents for follow-up of SDH s/p fall. He is accompanied by his wife today. No concerns. Denies headache, visual changes. No falls. He takes brilinta for a cardiac stent placed 4 years ago. This was initially held in the acute setting, but was resumed medication on 1/3. Cth obtained for today's visit.           Review of patient's allergies indicates:  No Known Allergies    Past Medical History:   Diagnosis Date    Acute urinary retention     Acute UTI     treated twice in past three weeks with antibx per patient-since indwelling catheter    Back problem     herniated disc-lumbar region    GERD (gastroesophageal reflux disease)     Hypertension     130s/80s    MS (multiple sclerosis)     Myocardial infarction     6/2020, has 1 stent    Neck stiffness     Neurogenic bladder     has indwelling catheter now    Sleep disturbance     Snoring     Visual impairment        Past Surgical History:   Procedure Laterality Date    COLONOSCOPY N/A 1/31/2019    Procedure: COLONOSCOPY;  Surgeon: Luis James MD;  Location: [facility];  Service: Endoscopy;  Laterality: N/A;    COLONOSCOPY N/A 5/2/2021    Procedure: COLONOSCOPY;  Surgeon: Nancy Flores MD;  Location: [facility] ENDO;  Service: Endoscopy;  Laterality: N/A;    CORONARY ANGIOPLASTY WITH STENT PLACEMENT  06/24/2019    proximal LAD     FINGER SURGERY      LASER OF PROSTATE W/ GREEN LIGHT PVP      RECTAL BOTOX INJECTION      SPINAL FUSION          Family History   Problem Relation Age of Onset    Dementia Mother     Parkinsonism Father     Heart block Father     Emphysema Father     No Known Problems Sister     Hypertension Brother     Anesthesia problems Neg Hx        Social History     Socioeconomic History    Marital status:      Spouse name: Bindu German    Number of children: 2    Highest education level:  Some college, no degree   Tobacco Use    Smoking status: Never    Smokeless tobacco: Never   Substance and Sexual Activity    Alcohol use: Yes     Alcohol/week: 1.0 standard drink     Types: 1 Cans of beer per week     Comment: occasionally drinks    Drug use: No    Sexual activity: Not Currently   Social History Narrative    65  y/o male, lives with home in one [place] residential home.     Has 2 daughters, Betty lives in [facility] and Lien lives in [place].         Scientology: Gnosticism            Review of Systems:  Eyes: no visual changes   Gastrointestinal: no nausea or vomiting   Neurological: no seizures      OBJECTIVE:     Vital Signs (Most Recent):  Vitals:    01/12/23 1445   BP: 112/68   Pulse: 69   Temp: 97.6 °F (36.4 °C)       Physical Exam:  General: well developed, well nourished, no distress.   Head: normocephalic, atraumatic  Neurologic: Alert and oriented. Thought content appropriate.  GCS: Motor: 6/Verbal: 5/Eyes: 4 GCS Total: 15  Mental Status: Awake, Alert, Oriented x 4  Language: No aphasia  Speech: No dysarthria  Cranial nerves: face symmetric, tongue midline, CN II-XII grossly intact.   Eyes: pupils equal, round, reactive to light with accomodation, EOMI.  Pulmonary: normal respirations, no signs of respiratory distress  Sensory: intact to light touch throughout  Motor Strength: at baseline. No movement on right 2/2 MS. Moves left side minimally. No abnormal movements seen.     Diagnostic Results:  I have personally reviewed all pertinent imaging.    CTH 1/12/2023:  - resolution of right anterior sdh  - no new hemorrhage    ASSESSMENT/PLAN:     Michael German is a 67 y.o. male who presents for follow-up of SDH after a fall. Neuro exam at baseline and stable. CTH shows resolution of prior SDH. All questions answered. Encouraged to call the clinic with questions/concerns prior to the next visit.    - RTC prn       Kylei Duron PA-C  Neurosurgery      Note dictated with voice recognition  software, please excuse any grammatical errors.

## 2023-02-20 ENCOUNTER — PATIENT MESSAGE (OUTPATIENT)
Dept: PSYCHIATRY | Facility: CLINIC | Age: 68
End: 2023-02-20
Payer: MEDICARE

## 2023-02-27 ENCOUNTER — TELEPHONE (OUTPATIENT)
Dept: NEUROLOGY | Facility: CLINIC | Age: 68
End: 2023-02-27
Payer: MEDICARE

## 2023-02-27 NOTE — TELEPHONE ENCOUNTER
----- Message from Linda Iqbal sent at 2/27/2023  2:20 PM CST -----  Regarding: Pt Advice  Contact: Pt [phone]  Bindu/ wife is calling and would like to speak with provider regarding physical therapy for her . Please Call

## 2023-03-02 ENCOUNTER — TELEPHONE (OUTPATIENT)
Dept: NEUROLOGY | Facility: CLINIC | Age: 68
End: 2023-03-02
Payer: MEDICARE

## 2023-03-02 NOTE — TELEPHONE ENCOUNTER
LVM telling pt's wife to call us back to speak about pt's referral for PT. Pt will need to see a provider before a referral can be sent so I will need to schedule pt for either an in person or virtual.

## 2023-03-02 NOTE — TELEPHONE ENCOUNTER
Spoke with pt and pt's wife. I explained that pt will have to be seen by a provider before a PT referral can be sent. Pt and pt's wife verbalized understanding. I explained that they can be seen for either a virtual or in person appt. I explained that they will probably get a sooner appt with a virtual. They said they prefer in person because they do not know how to work virtual. I got pt scheduled for 4/18 at 9:30 AM with AP.

## 2023-04-19 ENCOUNTER — TELEPHONE (OUTPATIENT)
Dept: NEUROLOGY | Facility: CLINIC | Age: 68
End: 2023-04-19
Payer: MEDICARE

## 2023-04-19 ENCOUNTER — PATIENT MESSAGE (OUTPATIENT)
Dept: NEUROLOGY | Facility: CLINIC | Age: 68
End: 2023-04-19
Payer: MEDICARE

## 2023-04-19 NOTE — TELEPHONE ENCOUNTER
----- Message from Sharon Ibrahim RN sent at 4/18/2023  5:23 PM CDT -----  Regarding: FW: LATER TIME  Contact: Wife - Bindu    ----- Message -----  From: Sofia Nixon  Sent: 4/18/2023   9:51 AM CDT  To: [facility] Staff  Subject: LATER TIME                                       Wife Harlan Ruano stated due to the nursing home, which is located in [place],mixing up the pt's appointment, he will be late. Wife ask if there's anything this afternoon that the pt can be seen please. Next available appointment is 05/29/2023 Wife ask for a call back as soon as possible please      Contact info   [phone] Wife

## 2023-04-19 NOTE — TELEPHONE ENCOUNTER
LVM telling pt's wife to call us back to reschedule pt's appt with AP. Will also send a message through the portal.

## 2023-04-21 ENCOUNTER — PES CALL (OUTPATIENT)
Dept: ADMINISTRATIVE | Facility: CLINIC | Age: 68
End: 2023-04-21
Payer: MEDICARE

## 2023-05-01 ENCOUNTER — OFFICE VISIT (OUTPATIENT)
Dept: NEUROLOGY | Facility: CLINIC | Age: 68
End: 2023-05-01
Payer: MEDICARE

## 2023-05-01 VITALS
BODY MASS INDEX: 27.52 KG/M2 | HEIGHT: 68 IN | DIASTOLIC BLOOD PRESSURE: 100 MMHG | HEART RATE: 70 BPM | SYSTOLIC BLOOD PRESSURE: 138 MMHG

## 2023-05-01 DIAGNOSIS — R25.2 SPASTICITY: ICD-10-CM

## 2023-05-01 DIAGNOSIS — Z74.09 IMPAIRED MOBILITY AND ADLS: ICD-10-CM

## 2023-05-01 DIAGNOSIS — G35 MULTIPLE SCLEROSIS: Primary | ICD-10-CM

## 2023-05-01 DIAGNOSIS — Z71.89 COUNSELING REGARDING GOALS OF CARE: ICD-10-CM

## 2023-05-01 DIAGNOSIS — Z78.9 IMPAIRED MOBILITY AND ADLS: ICD-10-CM

## 2023-05-01 PROCEDURE — 3075F SYST BP GE 130 - 139MM HG: CPT | Mod: CPTII,S$GLB,, | Performed by: CLINICAL NURSE SPECIALIST

## 2023-05-01 PROCEDURE — 4010F PR ACE/ARB THEARPY RXD/TAKEN: ICD-10-PCS | Mod: CPTII,S$GLB,, | Performed by: CLINICAL NURSE SPECIALIST

## 2023-05-01 PROCEDURE — 3080F DIAST BP >= 90 MM HG: CPT | Mod: CPTII,S$GLB,, | Performed by: CLINICAL NURSE SPECIALIST

## 2023-05-01 PROCEDURE — 99999 PR PBB SHADOW E&M-EST. PATIENT-LVL IV: ICD-10-PCS | Mod: PBBFAC,,, | Performed by: CLINICAL NURSE SPECIALIST

## 2023-05-01 PROCEDURE — 4010F ACE/ARB THERAPY RXD/TAKEN: CPT | Mod: CPTII,S$GLB,, | Performed by: CLINICAL NURSE SPECIALIST

## 2023-05-01 PROCEDURE — 3008F PR BODY MASS INDEX (BMI) DOCUMENTED: ICD-10-PCS | Mod: CPTII,S$GLB,, | Performed by: CLINICAL NURSE SPECIALIST

## 2023-05-01 PROCEDURE — 99215 OFFICE O/P EST HI 40 MIN: CPT | Mod: S$GLB,,, | Performed by: CLINICAL NURSE SPECIALIST

## 2023-05-01 PROCEDURE — 3288F PR FALLS RISK ASSESSMENT DOCUMENTED: ICD-10-PCS | Mod: CPTII,S$GLB,, | Performed by: CLINICAL NURSE SPECIALIST

## 2023-05-01 PROCEDURE — 3008F BODY MASS INDEX DOCD: CPT | Mod: CPTII,S$GLB,, | Performed by: CLINICAL NURSE SPECIALIST

## 2023-05-01 PROCEDURE — 3075F PR MOST RECENT SYSTOLIC BLOOD PRESS GE 130-139MM HG: ICD-10-PCS | Mod: CPTII,S$GLB,, | Performed by: CLINICAL NURSE SPECIALIST

## 2023-05-01 PROCEDURE — 3080F PR MOST RECENT DIASTOLIC BLOOD PRESSURE >= 90 MM HG: ICD-10-PCS | Mod: CPTII,S$GLB,, | Performed by: CLINICAL NURSE SPECIALIST

## 2023-05-01 PROCEDURE — 99215 PR OFFICE/OUTPT VISIT, EST, LEVL V, 40-54 MIN: ICD-10-PCS | Mod: S$GLB,,, | Performed by: CLINICAL NURSE SPECIALIST

## 2023-05-01 PROCEDURE — 99999 PR PBB SHADOW E&M-EST. PATIENT-LVL IV: CPT | Mod: PBBFAC,,, | Performed by: CLINICAL NURSE SPECIALIST

## 2023-05-01 PROCEDURE — 1100F PTFALLS ASSESS-DOCD GE2>/YR: CPT | Mod: CPTII,S$GLB,, | Performed by: CLINICAL NURSE SPECIALIST

## 2023-05-01 PROCEDURE — 1159F MED LIST DOCD IN RCRD: CPT | Mod: CPTII,S$GLB,, | Performed by: CLINICAL NURSE SPECIALIST

## 2023-05-01 PROCEDURE — 1126F PR PAIN SEVERITY QUANTIFIED, NO PAIN PRESENT: ICD-10-PCS | Mod: CPTII,S$GLB,, | Performed by: CLINICAL NURSE SPECIALIST

## 2023-05-01 PROCEDURE — 1126F AMNT PAIN NOTED NONE PRSNT: CPT | Mod: CPTII,S$GLB,, | Performed by: CLINICAL NURSE SPECIALIST

## 2023-05-01 PROCEDURE — 3288F FALL RISK ASSESSMENT DOCD: CPT | Mod: CPTII,S$GLB,, | Performed by: CLINICAL NURSE SPECIALIST

## 2023-05-01 PROCEDURE — 1100F PR PT FALLS ASSESS DOC 2+ FALLS/FALL W/INJURY/YR: ICD-10-PCS | Mod: CPTII,S$GLB,, | Performed by: CLINICAL NURSE SPECIALIST

## 2023-05-01 PROCEDURE — 1159F PR MEDICATION LIST DOCUMENTED IN MEDICAL RECORD: ICD-10-PCS | Mod: CPTII,S$GLB,, | Performed by: CLINICAL NURSE SPECIALIST

## 2023-05-01 RX ORDER — LOSARTAN POTASSIUM 50 MG/1
TABLET ORAL
COMMUNITY
Start: 2023-03-10

## 2023-05-01 NOTE — PROGRESS NOTES
Subjective:          Patient ID: Michael German is a 67 y.o. male who presents today for a routine clinic visit for MS.  He was last seen in September 2022 by Dr. Lyon. .The history has been provided by the patient. He is accompanied by his wife.     MS HPI:  DMT: None   Taking vitamin D3 as recommended? Not sure   He is at [facility] now for the past 1.5 weeks. He would like to get a new order for physical therapy.   He is being transferred from bed to wheelchair using Randee lift for 5-6 hours per day.   He denies any pain.   He has a chronic pressure ulcer on his sacrum that is currently covered.   He denies any recent infections.   He takes Myrbetriq for his bladder.   Bowels are regular.   He has had some changes in his vision. He has not seen an eye doctor lately. He will ask at the nursing facility if there is an eye doctor that can come around.   Memory is ok. Comprehension seems to be ok.   He is taking baclofen for muscle spasms.   He is not having any negative side effects to any medications.   Swallowing is ok.   Power chair seems to be working ok.     Medications:  Current Outpatient Medications   Medication Sig    acetaminophen (TYLENOL) 325 MG tablet Take 325 mg by mouth every 6 (six) hours as needed for Pain (2 TAB).    ascorbic acid, vitamin C, (VITAMIN C) 1000 MG tablet Take 500 mg by mouth 2 (two) times a day.    atorvastatin (LIPITOR) 20 MG tablet Take 20 mg by mouth every evening.    b complex vitamins tablet Take 1 tablet by mouth once daily.    baclofen (LIORESAL) 10 MG tablet Take 5 mg by mouth 2 (two) times daily.    BRILINTA 90 mg tablet Take 1 tablet (90 mg total) by mouth every 12 (twelve) hours.    calcium carbonate (TUMS) 200 mg calcium (500 mg) chewable tablet Take 2 tablets by mouth every 8 (eight) hours as needed for Heartburn.    carvediloL (COREG) 6.25 MG tablet Take 1 tablet (6.25 mg total) by mouth 2 (two) times daily.    cephALEXin (KEFLEX) 250 MG capsule  Take by mouth.    docusate sodium (COLACE) 100 MG capsule Take 100 mg by mouth nightly.    gabapentin (NEURONTIN) 100 MG capsule Take 1 capsule (100 mg total) by mouth 3 (three) times daily. (Patient not taking: Reported on 1/12/2023)    guaiFENesin 200 mg tablet Take 200 mg by mouth every 6 (six) hours as needed. 200mg/10ml    lactulose (CHRONULAC) 10 gram/15 mL solution SMARTSIG:Milliliter(s) By Mouth    losartan (COZAAR) 100 MG tablet Take 1 tablet (100 mg total) by mouth once daily.    mirabegron (MYRBETRIQ) 50 mg Tb24 Take 50 mg by mouth once daily.    nitroGLYCERIN (NITROSTAT) 0.4 MG SL tablet Place 1 tablet (0.4 mg total) under the tongue every 5 (five) minutes as needed for Chest pain. DO NOT EXCEED A TOTAL OF 3 DOSES IN 15 MINUTES (Patient not taking: Reported on 1/12/2023)    omeprazole (PRILOSEC) 20 MG capsule Take 20 mg by mouth once daily.    pantoprazole (PROTONIX) 40 MG tablet Take 1 tablet (40 mg total) by mouth once daily.    polyethylene glycol (GLYCOLAX) 17 gram PwPk Take 17 g by mouth once daily. 17 GMS IN 8 OZ OF WATER PO QD PRN CONSTIPATION    pyridoxine, vitamin B6, (B-6) 100 MG Tab Take 100 mg by mouth once daily.    senna (SENOKOT) 8.6 mg tablet Take 1 tablet by mouth once daily.    sodium chloride (OCEAN) 0.65 % nasal spray 2 sprays by Nasal route every 8 (eight) hours as needed for Congestion.     SOCIAL HISTORY  Social History     Tobacco Use    Smoking status: Never    Smokeless tobacco: Never   Substance Use Topics    Alcohol use: Yes     Alcohol/week: 1.0 standard drink     Types: 1 Cans of beer per week     Comment: occasionally drinks    Drug use: No       Living arrangements - the patient lives in a nursing home.           Objective:        1. 25 foot timed walk: Not possible    Neurologic Exam    In power chair  Slumped to the right; on Randee sling;   Right hemiplegia  Right arm is straight at his side. Right arm is very difficult to move. Left arm has more mobility. He can do  finger to nose with some difficulty and RSM in left hand (slow and difficult).   He can feel the tuning fork in both knees and hands.   Normal EOM. No obvious facial asymmetry or impaired sensation.      Imaging:     Results for orders placed during the hospital encounter of 02/27/19    MRI Brain Demyelinating Without Contrast    Impression  1. There is no acute abnormality.  Also, there is no definite or significant change in the appearance of the brain compared to the prior study.  The study is motion degraded.  There is moderate volume loss.  There is a moderate burden of white matter disease.  These findings were present previously and are consistent with the provided diagnosis of multiple sclerosis.  There is not been any significant interval progression.  There is no acute infarction, hemorrhage or mass/mass effect.  2. Persistent right mastoid partial opacification may represent an effusion.      Electronically signed by: Satish Wan MD  Date:    02/27/2019      Results for orders placed during the hospital encounter of 03/03/20    MRI Brain Demyelinating W W/O Contrast    Impression  Significant motion degradation.    White matter changes as described above, not significantly changed compared to the previous examination.    Involutional changes.      Electronically signed by: Yaneli Cantu IV., MD  Date:    03/03/2020  Time:    13:46      Labs:     Lab Results   Component Value Date    BRIBQCEV87VJ 25 (L) 04/13/2021    [id number] 27 (L) 08/17/2020    [id number] 29 (L) 02/20/2019       Lab Results   Component Value Date    WBC 10.92 12/25/2022    RBC 5.26 12/25/2022    HGB 16.0 12/25/2022    HCT 47.9 12/25/2022    MCV 91 12/25/2022    MCH 30.4 12/25/2022    MCHC 33.4 12/25/2022    RDW 13.7 12/25/2022     12/25/2022    MPV 10.0 12/25/2022    GRAN 7.8 (H) 12/25/2022    GRAN 71.4 12/25/2022    LYMPH 1.8 12/25/2022    LYMPH 16.3 (L) 12/25/2022    MONO 1.1 (H) 12/25/2022    MONO 9.6 12/25/2022     EOS 0.2 12/25/2022    BASO 0.03 12/25/2022    EOSINOPHIL 1.9 12/25/2022    BASOPHIL 0.3 12/25/2022     Sodium   Date Value Ref Range Status   12/25/2022 138 136 - 145 mmol/L Final     Potassium   Date Value Ref Range Status   12/25/2022 4.1 3.5 - 5.1 mmol/L Final     Chloride   Date Value Ref Range Status   12/25/2022 105 95 - 110 mmol/L Final     CO2   Date Value Ref Range Status   12/25/2022 25 23 - 29 mmol/L Final     Glucose   Date Value Ref Range Status   12/25/2022 109 70 - 110 mg/dL Final     BUN   Date Value Ref Range Status   12/25/2022 13 8 - 23 mg/dL Final     Creatinine   Date Value Ref Range Status   12/25/2022 0.7 0.5 - 1.4 mg/dL Final   11/03/2012 0.9 0.5 - 1.4 mg/dL Final     Calcium   Date Value Ref Range Status   12/25/2022 9.1 8.7 - 10.5 mg/dL Final   11/03/2012 9.8 8.7 - 10.5 mg/dL Final     Total Protein   Date Value Ref Range Status   12/25/2022 6.9 6.0 - 8.4 g/dL Final     Albumin   Date Value Ref Range Status   12/25/2022 3.8 3.5 - 5.2 g/dL Final     Total Bilirubin   Date Value Ref Range Status   12/25/2022 0.6 0.1 - 1.0 mg/dL Final     Comment:     For infants and newborns, interpretation of results should be based  on gestational age, weight and in agreement with clinical  observations.    Premature Infant recommended reference ranges:  Up to 24 hours.............<8.0 mg/dL  Up to 48 hours............<12.0 mg/dL  3-5 days..................<15.0 mg/dL  6-29 days.................<15.0 mg/dL       Alkaline Phosphatase   Date Value Ref Range Status   12/25/2022 125 55 - 135 U/L Final     AST   Date Value Ref Range Status   12/25/2022 28 10 - 40 U/L Final     ALT   Date Value Ref Range Status   12/25/2022 48 (H) 10 - 44 U/L Final     Anion Gap   Date Value Ref Range Status   12/25/2022 8 8 - 16 mmol/L Final   11/03/2012 13 5 - 15 meq/L Final     eGFR if    Date Value Ref Range Status   12/27/2021 >60.0 >60 mL/min/1.73 m^2 Final     eGFR if non    Date Value Ref  Range Status   12/27/2021 >60.0 >60 mL/min/1.73 m^2 Final     Comment:     Calculation used to obtain the estimated glomerular filtration  rate (eGFR) is the CKD-EPI equation.        Lab Results   Component Value Date    HEPBSAG Negative 08/17/2020    HEPBSAB Positive (A) 08/17/2020    HEPBCAB Negative 08/17/2020       MS Impression and Plan:     NEURO MULTIPLE SCLEROSIS IMPRESSION:   MS Status:     Number of relapses in the past year?:  0    Clinical Progression comment:  He has advanced disability from MS.   Plan:     DMT:  No change in management    Symptom Management:  Implement change in symptom management    Implement Change in Symptom Management:  Spasticity and Upper Extermity Dysfunction (Order placed for PT/OT at the nursing home.)       He will follow up with Dr. Lyon in late September.     Total time spent with patient: 29 minutes   Total time spent on encounter: 42 minutes         JORDAN Mccauley, CNS    Problem List Items Addressed This Visit          Neurologic Problems    Multiple sclerosis - Primary    Relevant Orders    Ambulatory referral/consult to Physical/Occupational Therapy

## 2023-05-01 NOTE — Clinical Note
Please call [facility] to get a copy of his updated medication list sent to us. Thanks!  Jami

## 2023-05-03 ENCOUNTER — DOCUMENTATION ONLY (OUTPATIENT)
Dept: NEUROLOGY | Facility: CLINIC | Age: 68
End: 2023-05-03
Payer: MEDICARE

## 2023-06-06 ENCOUNTER — PES CALL (OUTPATIENT)
Dept: ADMINISTRATIVE | Facility: CLINIC | Age: 68
End: 2023-06-06
Payer: MEDICARE

## 2023-07-21 ENCOUNTER — PATIENT MESSAGE (OUTPATIENT)
Dept: PSYCHIATRY | Facility: CLINIC | Age: 68
End: 2023-07-21
Payer: MEDICARE

## 2023-08-09 NOTE — ED NOTES
LOC/ APPEARANCE: The patient is AAOx4. Pt is speaking appropriately, no slurred speech. Continuous cardiac monitor, cont pulse ox, and auto BP cuff applied to patient. Pt is clean and well groomed. No JVD visible. Pt reports pain level of 2/10. Pt updated on POC. Bed low and locked with side rails up x2, call bell in pt reach.  SKIN: +lac to right side of head  RESPIRATORY: Airway is open and patent. Respirations-spontaneous, unlabored, regular rate, equal bilaterally on inspiration and expiration. No accessory muscle use noted.   CARDIAC: Patient has regular heart rate.  No peripheral edema noted, and patient has no c/o chest pain. Peripheral pulses present equal and strong throughout.  ABDOMEN: Soft and non-tender to palpation with no distention noted. Pt has no complaints of abnormal bowel movements, denies blood in stool. Pt reports normal appetite.   NEUROLOGIC: Eyes open spontaneously and facial expression symmetrical. Pt behavior appropriate to situation, and pt follows commands. Pt reports sensation present in all extremities when touched with a finger, denies any numbness or tingling. PERRLA  MUSCULOSKELETAL: +contracted to left upper extremity. + hx of MS, decreased ROM.   : No complaints of frequency, burning, urgency or blood in the urine. +incontinence.     Pt sent Katherin Moulton a request for a new pt appt, c/b but n/a, left v/m with c/b info

## 2023-09-25 ENCOUNTER — OFFICE VISIT (OUTPATIENT)
Dept: NEUROLOGY | Facility: CLINIC | Age: 68
End: 2023-09-25
Payer: MEDICARE

## 2023-09-25 VITALS
SYSTOLIC BLOOD PRESSURE: 122 MMHG | HEIGHT: 68 IN | DIASTOLIC BLOOD PRESSURE: 75 MMHG | BODY MASS INDEX: 27.52 KG/M2 | HEART RATE: 62 BPM

## 2023-09-25 DIAGNOSIS — G35 MS (MULTIPLE SCLEROSIS): ICD-10-CM

## 2023-09-25 DIAGNOSIS — R25.2 SPASTICITY: ICD-10-CM

## 2023-09-25 DIAGNOSIS — G82.20 PARAPARESIS: ICD-10-CM

## 2023-09-25 DIAGNOSIS — Z74.09 IMPAIRED MOBILITY AND ADLS: ICD-10-CM

## 2023-09-25 DIAGNOSIS — H53.2 DOUBLE VISION: ICD-10-CM

## 2023-09-25 DIAGNOSIS — Z78.9 IMPAIRED MOBILITY AND ADLS: ICD-10-CM

## 2023-09-25 DIAGNOSIS — M48.02 SPINAL STENOSIS, CERVICAL REGION: Primary | ICD-10-CM

## 2023-09-25 DIAGNOSIS — Z71.89 COUNSELING REGARDING GOALS OF CARE: ICD-10-CM

## 2023-09-25 DIAGNOSIS — N31.9 NEUROGENIC DYSFUNCTION OF THE URINARY BLADDER: ICD-10-CM

## 2023-09-25 DIAGNOSIS — D84.9 IMMUNOSUPPRESSION: ICD-10-CM

## 2023-09-25 PROCEDURE — 99215 OFFICE O/P EST HI 40 MIN: CPT | Mod: S$GLB,,, | Performed by: PSYCHIATRY & NEUROLOGY

## 2023-09-25 PROCEDURE — 4010F PR ACE/ARB THEARPY RXD/TAKEN: ICD-10-PCS | Mod: CPTII,S$GLB,, | Performed by: PSYCHIATRY & NEUROLOGY

## 2023-09-25 PROCEDURE — 1160F RVW MEDS BY RX/DR IN RCRD: CPT | Mod: CPTII,S$GLB,, | Performed by: PSYCHIATRY & NEUROLOGY

## 2023-09-25 PROCEDURE — 1159F MED LIST DOCD IN RCRD: CPT | Mod: CPTII,S$GLB,, | Performed by: PSYCHIATRY & NEUROLOGY

## 2023-09-25 PROCEDURE — 1160F PR REVIEW ALL MEDS BY PRESCRIBER/CLIN PHARMACIST DOCUMENTED: ICD-10-PCS | Mod: CPTII,S$GLB,, | Performed by: PSYCHIATRY & NEUROLOGY

## 2023-09-25 PROCEDURE — 3074F PR MOST RECENT SYSTOLIC BLOOD PRESSURE < 130 MM HG: ICD-10-PCS | Mod: CPTII,S$GLB,, | Performed by: PSYCHIATRY & NEUROLOGY

## 2023-09-25 PROCEDURE — 3008F BODY MASS INDEX DOCD: CPT | Mod: CPTII,S$GLB,, | Performed by: PSYCHIATRY & NEUROLOGY

## 2023-09-25 PROCEDURE — 3288F FALL RISK ASSESSMENT DOCD: CPT | Mod: CPTII,S$GLB,, | Performed by: PSYCHIATRY & NEUROLOGY

## 2023-09-25 PROCEDURE — 99999 PR PBB SHADOW E&M-EST. PATIENT-LVL V: ICD-10-PCS | Mod: PBBFAC,,, | Performed by: PSYCHIATRY & NEUROLOGY

## 2023-09-25 PROCEDURE — 3008F PR BODY MASS INDEX (BMI) DOCUMENTED: ICD-10-PCS | Mod: CPTII,S$GLB,, | Performed by: PSYCHIATRY & NEUROLOGY

## 2023-09-25 PROCEDURE — 3078F PR MOST RECENT DIASTOLIC BLOOD PRESSURE < 80 MM HG: ICD-10-PCS | Mod: CPTII,S$GLB,, | Performed by: PSYCHIATRY & NEUROLOGY

## 2023-09-25 PROCEDURE — 1126F AMNT PAIN NOTED NONE PRSNT: CPT | Mod: CPTII,S$GLB,, | Performed by: PSYCHIATRY & NEUROLOGY

## 2023-09-25 PROCEDURE — 99215 PR OFFICE/OUTPT VISIT, EST, LEVL V, 40-54 MIN: ICD-10-PCS | Mod: S$GLB,,, | Performed by: PSYCHIATRY & NEUROLOGY

## 2023-09-25 PROCEDURE — 3074F SYST BP LT 130 MM HG: CPT | Mod: CPTII,S$GLB,, | Performed by: PSYCHIATRY & NEUROLOGY

## 2023-09-25 PROCEDURE — 99999 PR PBB SHADOW E&M-EST. PATIENT-LVL V: CPT | Mod: PBBFAC,,, | Performed by: PSYCHIATRY & NEUROLOGY

## 2023-09-25 PROCEDURE — 3288F PR FALLS RISK ASSESSMENT DOCUMENTED: ICD-10-PCS | Mod: CPTII,S$GLB,, | Performed by: PSYCHIATRY & NEUROLOGY

## 2023-09-25 PROCEDURE — 1101F PT FALLS ASSESS-DOCD LE1/YR: CPT | Mod: CPTII,S$GLB,, | Performed by: PSYCHIATRY & NEUROLOGY

## 2023-09-25 PROCEDURE — 4010F ACE/ARB THERAPY RXD/TAKEN: CPT | Mod: CPTII,S$GLB,, | Performed by: PSYCHIATRY & NEUROLOGY

## 2023-09-25 PROCEDURE — 3078F DIAST BP <80 MM HG: CPT | Mod: CPTII,S$GLB,, | Performed by: PSYCHIATRY & NEUROLOGY

## 2023-09-25 PROCEDURE — 1101F PR PT FALLS ASSESS DOC 0-1 FALLS W/OUT INJ PAST YR: ICD-10-PCS | Mod: CPTII,S$GLB,, | Performed by: PSYCHIATRY & NEUROLOGY

## 2023-09-25 PROCEDURE — 1126F PR PAIN SEVERITY QUANTIFIED, NO PAIN PRESENT: ICD-10-PCS | Mod: CPTII,S$GLB,, | Performed by: PSYCHIATRY & NEUROLOGY

## 2023-09-25 PROCEDURE — 1159F PR MEDICATION LIST DOCUMENTED IN MEDICAL RECORD: ICD-10-PCS | Mod: CPTII,S$GLB,, | Performed by: PSYCHIATRY & NEUROLOGY

## 2023-09-25 RX ORDER — BACLOFEN 5 MG/1
TABLET ORAL
COMMUNITY
Start: 2023-08-16

## 2023-09-25 NOTE — PROGRESS NOTES
Subjective:          Patient ID: Michael German is a 68 y.o. male who presents today for a routine clinic visit for MS.   He comes in with his wife     MS HPI:  DMT: None  Taking vitamin D3 as recommended? Yes  His sacral wound being managed actively   Living at [facility] -- been there about 5 months.  Likes it better.    He has become hypophonic which has taken place chronically over months.   He's getting regular PT and he likes it very much; he finds all of this ROM is helping his joints.  He's become more numb in the fingers of the left hand;   He's having more double vision-- finds it disruptive   Wearing depends; no UTIs.     Medications:  Current Outpatient Medications   Medication Sig    acetaminophen (TYLENOL) 325 MG tablet Take 325 mg by mouth every 6 (six) hours as needed for Pain (2 TAB).    ascorbic acid, vitamin C, (VITAMIN C) 1000 MG tablet Take 500 mg by mouth 2 (two) times a day.    atorvastatin (LIPITOR) 20 MG tablet Take 20 mg by mouth every evening.    b complex vitamins tablet Take 1 tablet by mouth once daily.    baclofen (LIORESAL) 5 mg Tab tablet     BRILINTA 90 mg tablet Take 1 tablet (90 mg total) by mouth every 12 (twelve) hours.    calcium carbonate (TUMS) 200 mg calcium (500 mg) chewable tablet Take 2 tablets by mouth every 8 (eight) hours as needed for Heartburn.    carvediloL (COREG) 6.25 MG tablet Take 1 tablet (6.25 mg total) by mouth 2 (two) times daily.    guaiFENesin 200 mg tablet Take 200 mg by mouth every 6 (six) hours as needed. 200mg/10ml    losartan (COZAAR) 100 MG tablet Take 1 tablet (100 mg total) by mouth once daily.    losartan (COZAAR) 50 MG tablet     mirabegron (MYRBETRIQ) 50 mg Tb24 Take 50 mg by mouth once daily.    nitroGLYCERIN (NITROSTAT) 0.4 MG SL tablet Place 1 tablet (0.4 mg total) under the tongue every 5 (five) minutes as needed for Chest pain. DO NOT EXCEED A TOTAL OF 3 DOSES IN 15 MINUTES    omeprazole (PRILOSEC) 20 MG capsule Take 20  mg by mouth once daily.    pyridoxine, vitamin B6, (B-6) 100 MG Tab Take 100 mg by mouth once daily.    sodium chloride (OCEAN) 0.65 % nasal spray 2 sprays by Nasal route every 8 (eight) hours as needed for Congestion.    gabapentin (NEURONTIN) 100 MG capsule Take 1 capsule (100 mg total) by mouth 3 (three) times daily. (Patient not taking: Reported on 1/12/2023)     No current facility-administered medications for this visit.       SOCIAL HISTORY  Social History     Tobacco Use    Smoking status: Never    Smokeless tobacco: Never   Substance Use Topics    Alcohol use: Yes     Alcohol/week: 1.0 standard drink of alcohol     Types: 1 Cans of beer per week     Comment: occasionally drinks    Drug use: No           4/13/2021    10:54 AM   REVIEW OF SYMPTOMS   Do you feel abnormally tired on most days? Yes   Do you feel you generally sleep well? Yes   Do you have difficulty controlling your bladder?  Yes   Do you have difficulty controlling your bowels?  Yes   Do you have frequent muscle cramps, tightness or spasms in your limbs?  Yes   Do you have new visual symptoms?  Yes   Do you have worsening difficulty with your memory or thinking? Yes   Do you have worsening symptoms of anxiety or depression?  No   For patients who walk, Do you have more difficulty walking?  Not Applicable   Have you fallen since your last visit?  No   For patients who use wheelchairs: Do you have any skin wounds or breakdown? Yes   Do you have difficulty using your hands?  Yes   Do you have shooting or burning pain? No   Do you have difficulty with sexual function?  Yes   If you are sexually active, are you using birth control? Y/N  N/A Not Applicable   Do you often choke when swallowing liquids or solid food?  No   Do you experience worsening symptoms when overheated? Yes   Do you need any new equipment such as a wheelchair, walker or shower chair? No   Do you receive co-pay financial assistance for your principal MS medicine? No   Would you be  interested in participating in an MS research trial in the future? No   For patients on Gilenya, Tecfidera, Aubagio, Rituxan, Ocrevus, Tysabri, Lemtrada or Methotrexate, are you aware that you should NOT receive live virus vaccines?  Not Applicable   Do you feel you have adequate family/friend support?  Yes   Do you have health insurance?   Yes   Are you currently employed? No   Do you receive SSDI/SSI?  Yes   Do you use marijuana or cannabis products? No   Have you been diagnosed with a urinary tract infection since your last visit here? No   Have you been diagnosed with a respiratory tract infection since your last visit here? No   Have you been to the emergency room since your last visit here? No   Have you been hospitalized since your last visit here?  No                Objective:          Neurologic Exam  In conor-chair  Slumped to the right; on Randee sling;   Right hemiplegia  LUE 3/5 in extensors and LLE 1/5 in flexors  LLE plegic  Unable to feel tuning fork on right arm or leg or on LLE    Imaging:     Results for orders placed during the hospital encounter of 02/27/19    MRI Brain Demyelinating Without Contrast    Impression  1. There is no acute abnormality.  Also, there is no definite or significant change in the appearance of the brain compared to the prior study.  The study is motion degraded.  There is moderate volume loss.  There is a moderate burden of white matter disease.  These findings were present previously and are consistent with the provided diagnosis of multiple sclerosis.  There is not been any significant interval progression.  There is no acute infarction, hemorrhage or mass/mass effect.  2. Persistent right mastoid partial opacification may represent an effusion.      Electronically signed by: Satish Wan MD  Date:    02/27/2019  Time:    11:31    No results found for this or any previous visit.    No results found for this or any previous visit.    Results for orders placed during  the hospital encounter of 03/03/20    MRI Brain Demyelinating W W/O Contrast    Impression  Significant motion degradation.    White matter changes as described above, not significantly changed compared to the previous examination.    Involutional changes.      Electronically signed by: Yaneli Cantu IV., MD  Date:    03/03/2020  Time:    13:46    No results found for this or any previous visit.    No results found for this or any previous visit.        Labs:     Lab Results   Component Value Date    SAFFTHSJ96AI 25 (L) 04/13/2021    UBJHUXAL82LF 27 (L) 08/17/2020    UWLCBAOI35ZJ 29 (L) 02/20/2019     Lab Results   Component Value Date    JCVINDEX 0.50 (A) 05/11/2016    JCVANTIBODY Positive (A) 05/11/2016     Lab Results   Component Value Date    VP5JPHGW 65.6 05/11/2016    ABSOLUTECD3 476 (L) 05/11/2016    VV4AYEUA 21.2 05/11/2016    ABSOLUTECD8 154 (L) 05/11/2016    NA0LWLZJ 44.2 05/11/2016    ABSOLUTECD4 321 05/11/2016    LABCD48 2.09 05/11/2016     Lab Results   Component Value Date    WBC 10.92 12/25/2022    RBC 5.26 12/25/2022    HGB 16.0 12/25/2022    HCT 47.9 12/25/2022    MCV 91 12/25/2022    MCH 30.4 12/25/2022    MCHC 33.4 12/25/2022    RDW 13.7 12/25/2022     12/25/2022    MPV 10.0 12/25/2022    GRAN 7.8 (H) 12/25/2022    GRAN 71.4 12/25/2022    LYMPH 1.8 12/25/2022    LYMPH 16.3 (L) 12/25/2022    MONO 1.1 (H) 12/25/2022    MONO 9.6 12/25/2022    EOS 0.2 12/25/2022    BASO 0.03 12/25/2022    EOSINOPHIL 1.9 12/25/2022    BASOPHIL 0.3 12/25/2022     Sodium   Date Value Ref Range Status   12/25/2022 138 136 - 145 mmol/L Final     Potassium   Date Value Ref Range Status   12/25/2022 4.1 3.5 - 5.1 mmol/L Final     Chloride   Date Value Ref Range Status   12/25/2022 105 95 - 110 mmol/L Final     CO2   Date Value Ref Range Status   12/25/2022 25 23 - 29 mmol/L Final     Glucose   Date Value Ref Range Status   12/25/2022 109 70 - 110 mg/dL Final     BUN   Date Value Ref Range Status   12/25/2022 13 8 - 23  mg/dL Final     Creatinine   Date Value Ref Range Status   12/25/2022 0.7 0.5 - 1.4 mg/dL Final   11/03/2012 0.9 0.5 - 1.4 mg/dL Final     Calcium   Date Value Ref Range Status   12/25/2022 9.1 8.7 - 10.5 mg/dL Final   11/03/2012 9.8 8.7 - 10.5 mg/dL Final     Total Protein   Date Value Ref Range Status   12/25/2022 6.9 6.0 - 8.4 g/dL Final     Albumin   Date Value Ref Range Status   12/25/2022 3.8 3.5 - 5.2 g/dL Final     Total Bilirubin   Date Value Ref Range Status   12/25/2022 0.6 0.1 - 1.0 mg/dL Final     Comment:     For infants and newborns, interpretation of results should be based  on gestational age, weight and in agreement with clinical  observations.    Premature Infant recommended reference ranges:  Up to 24 hours.............<8.0 mg/dL  Up to 48 hours............<12.0 mg/dL  3-5 days..................<15.0 mg/dL  6-29 days.................<15.0 mg/dL       Alkaline Phosphatase   Date Value Ref Range Status   12/25/2022 125 55 - 135 U/L Final     AST   Date Value Ref Range Status   12/25/2022 28 10 - 40 U/L Final     ALT   Date Value Ref Range Status   12/25/2022 48 (H) 10 - 44 U/L Final     Anion Gap   Date Value Ref Range Status   12/25/2022 8 8 - 16 mmol/L Final   11/03/2012 13 5 - 15 meq/L Final     eGFR if    Date Value Ref Range Status   12/27/2021 >60.0 >60 mL/min/1.73 m^2 Final     eGFR if non    Date Value Ref Range Status   12/27/2021 >60.0 >60 mL/min/1.73 m^2 Final     Comment:     Calculation used to obtain the estimated glomerular filtration  rate (eGFR) is the CKD-EPI equation.        Lab Results   Component Value Date    HEPBSAG Negative 08/17/2020    HEPBSAB Positive (A) 08/17/2020    HEPBCAB Negative 08/17/2020           MS Impression and Plan:     NEURO MULTIPLE SCLEROSIS IMPRESSION:   MS Status:     Number of relapses in the past year?:  0    Clinical Progression:  Worsened    Type:  Progressive    MRI Progression:  N/A  Plan:     DMT:  No change in  management    Symptom Management:  Implement change in symptom management    Implement Change in Symptom Management:  Vision     Mostly likely reason for progression is MS itself, but in light of history of C spine surgery will pursue imaging of spine to r/u compressive myelopathy.  He cannot lie flat in MRI b/c of spasms, so will instead do CT Cervical spine.     Refer to Neuro-ophthalmology   F/u ion 6 mo Jami Deluca CNS            Problem List Items Addressed This Visit          Unprioritized    Immunosuppression    Paraparesis     Other Visit Diagnoses       Spinal stenosis, cervical region    -  Primary    Relevant Orders    CT Cervical Spine Without Contrast    Double vision        Relevant Orders    Ambulatory referral/consult to Ophthalmology    MS (multiple sclerosis)        Relevant Orders    Ambulatory referral/consult to Ophthalmology    Impaired mobility and ADLs        Counseling regarding goals of care        Neurogenic dysfunction of the urinary bladder        Spasticity                Kim Lyon MD    I spent a total of 40 minutes on the day of the visit.This includes face to face time and non-face to face time preparing to see the patient (eg, review of tests), obtaining and/or reviewing separately obtained history, documenting clinical information in the electronic or other health record, independently interpreting results and communicating results to the patient/family/caregiver, or care coordinator.

## 2023-10-02 ENCOUNTER — HOSPITAL ENCOUNTER (OUTPATIENT)
Dept: RADIOLOGY | Facility: HOSPITAL | Age: 68
Discharge: HOME | End: 2023-10-02
Attending: PSYCHIATRY & NEUROLOGY
Payer: MEDICARE

## 2023-10-02 DIAGNOSIS — M48.02 SPINAL STENOSIS, CERVICAL REGION: ICD-10-CM

## 2023-10-03 ENCOUNTER — HOSPITAL ENCOUNTER (OUTPATIENT)
Dept: RADIOLOGY | Facility: HOSPITAL | Age: 68
Discharge: HOME | End: 2023-10-03
Attending: PSYCHIATRY & NEUROLOGY
Payer: MEDICARE

## 2023-10-03 PROCEDURE — 72125 CT NECK SPINE W/O DYE: CPT | Mod: TC

## 2023-10-10 ENCOUNTER — TELEPHONE (OUTPATIENT)
Dept: NEUROLOGY | Facility: CLINIC | Age: 68
End: 2023-10-10

## 2023-10-10 NOTE — TELEPHONE ENCOUNTER
----- Message from Ana Christensen sent at 10/10/2023  2:55 PM CDT -----  Regarding: results  Contact: Bindu [phone]  Type:  Test Results    Who Called: Bindu contreras    Name of Test (Lab/Mammo/Etc): CT  Date of Test: 10/3/23    Ordering Provider: Kim Lyon    Where the test was performed: [facility]    Would the patient rather a call back or a response via [facility]? Call back    Best Call Back Number: [phone]    Additional Information:

## 2023-10-13 NOTE — TELEPHONE ENCOUNTER
----- Message from Joseph Morocho sent at 10/13/2023  3:21 PM CDT -----  Regarding: Pt advice  Contact: [phone]  Pt wife is calling to speak with the provider nurse wife would like to discuss pt plan of care. Wife states she caller earlier during the week no response. Please call

## 2024-01-22 ENCOUNTER — PATIENT MESSAGE (OUTPATIENT)
Dept: PSYCHIATRY | Facility: CLINIC | Age: 69
End: 2024-01-22
Payer: MEDICARE

## 2024-01-29 ENCOUNTER — HOSPITAL ENCOUNTER (EMERGENCY)
Facility: HOSPITAL | Age: 69
Discharge: HOME | End: 2024-01-29
Attending: EMERGENCY MEDICINE
Payer: MEDICARE

## 2024-01-29 VITALS
HEART RATE: 82 BPM | SYSTOLIC BLOOD PRESSURE: 128 MMHG | OXYGEN SATURATION: 95 % | RESPIRATION RATE: 21 BRPM | BODY MASS INDEX: 24.86 KG/M2 | DIASTOLIC BLOOD PRESSURE: 62 MMHG | WEIGHT: 164 LBS | TEMPERATURE: 98 F | HEIGHT: 68 IN

## 2024-01-29 DIAGNOSIS — J04.0 LARYNGITIS: ICD-10-CM

## 2024-01-29 DIAGNOSIS — R05.9 COUGH: Primary | ICD-10-CM

## 2024-01-29 LAB
ALBUMIN SERPL BCP-MCNC: 3.8 G/DL (ref 3.5–5.2)
ALP SERPL-CCNC: 86 U/L (ref 55–135)
ALT SERPL W/O P-5'-P-CCNC: 25 U/L (ref 10–44)
ANION GAP SERPL CALC-SCNC: 7 MMOL/L (ref 8–16)
AST SERPL-CCNC: 19 U/L (ref 10–40)
BASOPHILS # BLD AUTO: 0.02 K/UL (ref 0–0.2)
BASOPHILS NFR BLD: 0.2 % (ref 0–1.9)
BILIRUB SERPL-MCNC: 0.5 MG/DL (ref 0.1–1)
BILIRUB UR QL STRIP: NEGATIVE
BUN SERPL-MCNC: 11 MG/DL (ref 8–23)
CALCIUM SERPL-MCNC: 9.1 MG/DL (ref 8.7–10.5)
CHLORIDE SERPL-SCNC: 107 MMOL/L (ref 95–110)
CLARITY UR: CLEAR
CO2 SERPL-SCNC: 23 MMOL/L (ref 23–29)
COLOR UR: YELLOW
CREAT SERPL-MCNC: 0.6 MG/DL (ref 0.5–1.4)
DIFFERENTIAL METHOD BLD: NORMAL
EOSINOPHIL # BLD AUTO: 0.3 K/UL (ref 0–0.5)
EOSINOPHIL NFR BLD: 3.7 % (ref 0–8)
ERYTHROCYTE [DISTWIDTH] IN BLOOD BY AUTOMATED COUNT: 13.8 % (ref 11.5–14.5)
EST. GFR  (NO RACE VARIABLE): >60 ML/MIN/1.73 M^2
GLUCOSE SERPL-MCNC: 113 MG/DL (ref 70–110)
GLUCOSE UR QL STRIP: NEGATIVE
GROUP A STREP, MOLECULAR: NEGATIVE
HCT VFR BLD AUTO: 44.5 % (ref 40–54)
HGB BLD-MCNC: 15 G/DL (ref 14–18)
HGB UR QL STRIP: NEGATIVE
IMM GRANULOCYTES # BLD AUTO: 0.04 K/UL (ref 0–0.04)
IMM GRANULOCYTES NFR BLD AUTO: 0.4 % (ref 0–0.5)
INFLUENZA A, MOLECULAR: NEGATIVE
INFLUENZA B, MOLECULAR: NEGATIVE
KETONES UR QL STRIP: ABNORMAL
LEUKOCYTE ESTERASE UR QL STRIP: NEGATIVE
LYMPHOCYTES # BLD AUTO: 1.7 K/UL (ref 1–4.8)
LYMPHOCYTES NFR BLD: 19.4 % (ref 18–48)
MCH RBC QN AUTO: 30.4 PG (ref 27–31)
MCHC RBC AUTO-ENTMCNC: 33.7 G/DL (ref 32–36)
MCV RBC AUTO: 90 FL (ref 82–98)
MONOCYTES # BLD AUTO: 0.9 K/UL (ref 0.3–1)
MONOCYTES NFR BLD: 9.8 % (ref 4–15)
NEUTROPHILS # BLD AUTO: 6 K/UL (ref 1.8–7.7)
NEUTROPHILS NFR BLD: 66.5 % (ref 38–73)
NITRITE UR QL STRIP: NEGATIVE
NRBC BLD-RTO: 0 /100 WBC
PH UR STRIP: 6 [PH] (ref 5–8)
PLATELET # BLD AUTO: 259 K/UL (ref 150–450)
PMV BLD AUTO: 9.8 FL (ref 9.2–12.9)
POTASSIUM SERPL-SCNC: 4.1 MMOL/L (ref 3.5–5.1)
PROT SERPL-MCNC: 6.8 G/DL (ref 6–8.4)
PROT UR QL STRIP: NEGATIVE
RBC # BLD AUTO: 4.94 M/UL (ref 4.6–6.2)
SARS-COV-2 RDRP RESP QL NAA+PROBE: NEGATIVE
SODIUM SERPL-SCNC: 137 MMOL/L (ref 136–145)
SP GR UR STRIP: 1.02 (ref 1–1.03)
SPECIMEN SOURCE: NORMAL
TROPONIN I SERPL HS-MCNC: 3.6 PG/ML (ref 0–14.9)
TROPONIN I SERPL HS-MCNC: 4.1 PG/ML (ref 0–14.9)
URN SPEC COLLECT METH UR: ABNORMAL
UROBILINOGEN UR STRIP-ACNC: NEGATIVE EU/DL
WBC # BLD AUTO: 8.96 K/UL (ref 3.9–12.7)

## 2024-01-29 PROCEDURE — U0002 COVID-19 LAB TEST NON-CDC: HCPCS | Performed by: EMERGENCY MEDICINE

## 2024-01-29 PROCEDURE — 96360 HYDRATION IV INFUSION INIT: CPT | Mod: 59

## 2024-01-29 PROCEDURE — 25000003 PHARM REV CODE 250: Performed by: EMERGENCY MEDICINE

## 2024-01-29 PROCEDURE — 93005 ELECTROCARDIOGRAM TRACING: CPT | Mod: 59 | Performed by: GENERAL PRACTICE

## 2024-01-29 PROCEDURE — 80053 COMPREHEN METABOLIC PANEL: CPT | Performed by: EMERGENCY MEDICINE

## 2024-01-29 PROCEDURE — 81003 URINALYSIS AUTO W/O SCOPE: CPT | Performed by: EMERGENCY MEDICINE

## 2024-01-29 PROCEDURE — 87502 INFLUENZA DNA AMP PROBE: CPT | Performed by: EMERGENCY MEDICINE

## 2024-01-29 PROCEDURE — 85025 COMPLETE CBC W/AUTO DIFF WBC: CPT | Performed by: EMERGENCY MEDICINE

## 2024-01-29 PROCEDURE — 51701 INSERT BLADDER CATHETER: CPT

## 2024-01-29 PROCEDURE — 99285 EMERGENCY DEPT VISIT HI MDM: CPT | Mod: 25

## 2024-01-29 PROCEDURE — 93010 ELECTROCARDIOGRAM REPORT: CPT | Mod: ,,, | Performed by: GENERAL PRACTICE

## 2024-01-29 PROCEDURE — 84484 ASSAY OF TROPONIN QUANT: CPT | Performed by: EMERGENCY MEDICINE

## 2024-01-29 PROCEDURE — 87651 STREP A DNA AMP PROBE: CPT | Performed by: EMERGENCY MEDICINE

## 2024-01-29 RX ORDER — LIDOCAINE HYDROCHLORIDE 20 MG/ML
15 SOLUTION OROPHARYNGEAL ONCE
Status: COMPLETED | OUTPATIENT
Start: 2024-01-29 | End: 2024-01-29

## 2024-01-29 RX ORDER — SODIUM CHLORIDE 9 MG/ML
500 INJECTION, SOLUTION INTRAVENOUS
Status: COMPLETED | OUTPATIENT
Start: 2024-01-29 | End: 2024-01-29

## 2024-01-29 RX ORDER — ALUMINUM HYDROXIDE, MAGNESIUM HYDROXIDE, AND SIMETHICONE 1200; 120; 1200 MG/30ML; MG/30ML; MG/30ML
30 SUSPENSION ORAL ONCE
Status: COMPLETED | OUTPATIENT
Start: 2024-01-29 | End: 2024-01-29

## 2024-01-29 RX ADMIN — ALUMINUM HYDROXIDE, MAGNESIUM HYDROXIDE, AND SIMETHICONE 30 ML: 200; 200; 20 SUSPENSION ORAL at 04:01

## 2024-01-29 RX ADMIN — SODIUM CHLORIDE 500 ML: 0.9 INJECTION, SOLUTION INTRAVENOUS at 04:01

## 2024-01-29 RX ADMIN — LIDOCAINE HYDROCHLORIDE 15 ML: 20 SOLUTION ORAL at 04:01

## 2024-01-29 NOTE — ED PROVIDER NOTES
Encounter Date: 1/29/2024       History     Chief Complaint   Patient presents with    Cough     Cough, post nasal drip with sore throat x 2 days, from [facility]     Chief complaint is losing his voice for 5-7 days along with a cough.  He has occasional pains across his chest when he coughs occasional pain is midback when he moves he is unable to walk because he has multiple sclerosis diagnosed in [age].  Complains slight burning in the epigastric area.  Does have a history of a stent in his heart no hypertension diabetes high cholesterol never did smoke he is with his wife at the bedside.  He is awake alert cooperative just has to talk in a whisper because of his voice loss so far he has got a flu COVID and strep test done I will add labs including a chest x-ray CBC CMP and UA for completeness.  Patient vital signs stable          Review of patient's allergies indicates:  No Known Allergies  Past Medical History:   Diagnosis Date    Acute urinary retention     Acute UTI     treated twice in past three weeks with antibx per patient-since indwelling catheter    Back problem     herniated disc-lumbar region    GERD (gastroesophageal reflux disease)     Hypertension     130s/80s    MS (multiple sclerosis)     Myocardial infarction     6/2020, has 1 stent    Neck stiffness     Neurogenic bladder     has indwelling catheter now    Sleep disturbance     Snoring     Visual impairment      Past Surgical History:   Procedure Laterality Date    COLONOSCOPY N/A 1/31/2019    Procedure: COLONOSCOPY;  Surgeon: Luis James MD;  Location: [facility];  Service: Endoscopy;  Laterality: N/A;    COLONOSCOPY N/A 5/2/2021    Procedure: COLONOSCOPY;  Surgeon: Nancy Flores MD;  Location: [facility] ENDO;  Service: Endoscopy;  Laterality: N/A;    CORONARY ANGIOPLASTY WITH STENT PLACEMENT  06/24/2019    proximal LAD     FINGER SURGERY      LASER OF PROSTATE W/ GREEN LIGHT PVP      RECTAL BOTOX INJECTION      SPINAL FUSION        Family History   Problem Relation Age of Onset    Dementia Mother     Parkinsonism Father     Heart block Father     Emphysema Father     No Known Problems Sister     Hypertension Brother     Anesthesia problems Neg Hx      Social History     Tobacco Use    Smoking status: Never    Smokeless tobacco: Never   Substance Use Topics    Alcohol use: Yes     Alcohol/week: 1.0 standard drink of alcohol     Types: 1 Cans of beer per week     Comment: occasionally drinks    Drug use: No     Review of Systems   Constitutional:  Negative for chills and fever.   HENT:  Positive for sore throat. Negative for ear pain and rhinorrhea.         Loss of voice   Eyes:  Negative for pain and visual disturbance.   Respiratory:  Positive for cough. Negative for shortness of breath.    Cardiovascular:  Negative for chest pain and palpitations.   Gastrointestinal:  Negative for abdominal pain, constipation, diarrhea, nausea and vomiting.   Genitourinary:  Negative for dysuria, frequency, hematuria and urgency.   Musculoskeletal:  Negative for back pain, joint swelling and myalgias.   Skin:  Negative for rash.   Neurological:  Negative for dizziness, seizures, weakness and headaches.   Psychiatric/Behavioral:  Negative for dysphoric mood. The patient is not nervous/anxious.        Physical Exam     Initial Vitals [01/29/24 0307]   BP Pulse Resp Temp SpO2   116/63 81 16 98 °F (36.7 °C) 95 %      MAP       --         Physical Exam    Nursing note and vitals reviewed.  Constitutional: He appears well-developed and well-nourished.   HENT:   Head: Normocephalic and atraumatic.   Eyes: Conjunctivae, EOM and lids are normal. Pupils are equal, round, and reactive to light.   Neck: Trachea normal. Neck supple. No thyroid mass present.   Cardiovascular:  Normal rate, regular rhythm and normal heart sounds.           Pulmonary/Chest: Breath sounds normal. No respiratory distress.   Abdominal: Abdomen is soft. There is no abdominal tenderness.    Musculoskeletal:         General: Normal range of motion.      Cervical back: Neck supple.      Comments: Patient with difficulty with strength in his arms or legs.  He can not moves legs and moves his arms minimally.     Neurological: He is alert and oriented to person, place, and time. He has normal strength and normal reflexes. No cranial nerve deficit or sensory deficit.   Skin: Skin is warm and dry.   Small decubitus sacral noted stage I   Psychiatric: He has a normal mood and affect. His speech is normal and behavior is normal. Judgment and thought content normal.         ED Course   Procedures  Labs Reviewed   COMPREHENSIVE METABOLIC PANEL - Abnormal; Notable for the following components:       Result Value    Glucose 113 (*)     Anion Gap 7 (*)     All other components within normal limits   URINALYSIS, REFLEX TO URINE CULTURE - Abnormal; Notable for the following components:    Ketones, UA Trace (*)     All other components within normal limits    Narrative:     Specimen Source->Urine   GROUP A STREP, MOLECULAR   THROAT SCREEN, RAPID STREP   THROAT SCREEN, RAPID STREP   SARS-COV-2 RNA AMPLIFICATION, QUAL   INFLUENZA A AND B ANTIGEN    Narrative:     Specimen Source->Nasopharyngeal Swab   CBC W/ AUTO DIFFERENTIAL   TROPONIN I HIGH SENSITIVITY   TROPONIN I HIGH SENSITIVITY        ECG Results              EKG 12-lead (In process)  Result time 01/29/24 08:40:16      In process by Interface, Lab In [facility] (01/29/24 08:40:16)                   Narrative:    Test Reason : R05.9,    Vent. Rate : 073 BPM     Atrial Rate : 073 BPM     P-R Int : 142 ms          QRS Dur : 104 ms      QT Int : 388 ms       P-R-T Axes : 049 -11 018 degrees     QTc Int : 427 ms    Normal sinus rhythm  Normal ECG  When compared with ECG of 27-DEC-2021 20:40,  Borderline criteria for Lateral infarct are no longer Present    Referred By: [email or web address]   SELF           Confirmed By:                       In process by Interface, Lab In [facility]  (01/29/24 05:54:38)                   Narrative:    Test Reason : R05.9,    Vent. Rate : 073 BPM     Atrial Rate : 073 BPM     P-R Int : 142 ms          QRS Dur : 104 ms      QT Int : 388 ms       P-R-T Axes : 049 -11 018 degrees     QTc Int : 427 ms    Normal sinus rhythm  Normal ECG  When compared with ECG of 27-DEC-2021 20:40,  Borderline criteria for Lateral infarct are no longer Present    Referred By: [email or web address]   SELF           Confirmed By:                                   Imaging Results              X-Ray Chest AP Portable (Final result)  Result time 01/29/24 07:59:26      Final result by Jarred Remy MD (01/29/24 07:59:26)                   Narrative:    Reason: Cough    FINDINGS:    Portable chest with comparison chest x-ray April 30, 2021 show normal cardiomediastinal silhouette.  Lungs are clear. Pulmonary vasculature is normal. No acute osseous abnormality.    IMPRESSION:    No acute cardiopulmonary abnormality.    Electronically signed by:  Jarred Remy DO  01/29/2024 07:59 AM CST Workstation: [id number]                                     Medications   0.9%  NaCl infusion (0 mLs Intravenous Stopped 1/29/24 0500)   aluminum-magnesium hydroxide-simethicone 200-200-20 mg/5 mL suspension 30 mL (30 mLs Oral Given 1/29/24 0409)     And   LIDOcaine viscous HCl 2% oral solution 15 mL (15 mLs Oral Given 1/29/24 0409)     Medical Decision Making  Differential includes but not limited to dehydration, viral upper respiratory tract infection, pneumonia, laryngitis, electrolyte abnormality, vocal cord mass, MS flare, vocal cord paralysis    Emergent evaluation of a 68-year-old male presents emergency department with above-mentioned complaints.  He is well-appearing, chronically ill-appearing.  Patient has chronic MS in his bed-bound.  In the emergency department night team initiated workup which included strep, COVID, flu.  Sign-out was that if patient is 2nd troponin and strep test negative that he could  be discharged home.  Patient when I evaluated him was in no distress.  Afebrile, vital signs within normal limits.  I added on a urinalysis as well and urinalysis is negative.  Patient seems clinically have laryngitis/upper respiratory tract infection.  Recommend supportive care at home.  Also considered that this could be vocal cord paralysis/mass etcetera and do believe that outpatient ENT follow-up for direct visualization is warranted if his laryngitis does not improve.  Also considered that this could be MS related as well and then he should follow up with this doctor if symptoms do not improve.  He will be discharged home follow up with PCP.    A dictation software program was used for this note.  Please expect some simple typographical  errors in this note.    I had a detailed discussion with the patient and/or guardian regarding: The historical points, exam findings, and diagnostic results supporting the discharge diagnosis, lab results, pertinent radiology results, and the need for outpatient follow-up, for definitive care with a family practitioner and to return to the emergency department if symptoms worsen or persist or if there are any questions or concerns that arise at home. All questions have been answered in detail. Strict return to Emergency Department precautions have been provided.       Amount and/or Complexity of Data Reviewed  Labs: ordered. Decision-making details documented in ED Course.  Radiology: ordered and independent interpretation performed. Decision-making details documented in ED Course.  ECG/medicine tests: ordered and independent interpretation performed. Decision-making details documented in ED Course.    Risk  OTC drugs.  Prescription drug management.                                      Clinical Impression:  Final diagnoses:  [R05.9] Cough (Primary)  [J04.0] Laryngitis          ED Disposition Condition    Discharge Stable          ED Prescriptions    None       Follow-up  Information       Follow up With Specialties Details Why Contact Info Additional Information    Rufus Gaines MD Hospitalist In 3 days  [address]  [place] [address]  [phone]       [facility] - Emergency Dept Emergency Medicine  If symptoms worsen [address] [facility] [address] [facility] [address] [phone] 1st floor    Edouard Alford MD Otolaryngology In 3 days  [address] [facility] [address]SE [address]RO[address]  [facility] [address]  [phone]                Phil Singh DO  01/29/24 5459

## 2024-01-29 NOTE — DISCHARGE INSTRUCTIONS
RETURN TO EMERGENCY DEPARTMENT WITHOUT FAIL, IF YOUR SYMPTOMS WORSEN, IF YOU GET NEW OR DIFFERENT SYMPTOMS, IF YOU ARE UNABLE TO FOLLOW UP AS DIRECTED, OR IF YOU HAVE ANY CONCERNS OR WORRIES.      Follow up with primary care provider on an outpatient basis

## 2024-01-29 NOTE — ED NOTES
"PRIVATE ROOM. EVEN AND NON LABORED RESPIRATIONS.  O2 IN PROGRESS. 2LNC.  FREQUENT COUGH. AIRWAY CLEAR.  PULSES REGULAR.  < 3" CAPILLARY REFILL. SKIN WDI.  DISHEVELED. FRAIL. MAEW.  NON DISTENDED ABDOMEN. ALERT, ORIENTED AND CALM.  HOB ELEVATED FOR COMFORT. FALLS PRECAUTIONS. NAD AT THIS TIME.  CALL LIGHT IN REACH.  "

## 2024-01-31 ENCOUNTER — TELEPHONE (OUTPATIENT)
Dept: OPHTHALMOLOGY | Facility: CLINIC | Age: 69
End: 2024-01-31
Payer: MEDICARE

## 2024-01-31 NOTE — TELEPHONE ENCOUNTER
----- Message from April ÓSCAR Cobian MA sent at 1/30/2024  3:10 PM CST -----  Regarding: FW: Appt R/s    ----- Message -----  From: Nayeli Berumen  Sent: 1/30/2024   2:52 PM CST  To: Bea Conde Staff  Subject: Appt R/s                                         Patients called in regards to r/s pt's 2/6 appt to March due to pt being sick.    Please call back to further assist- [phone]

## 2024-02-07 DIAGNOSIS — J38.3 VOCAL CORD ATROPHY: ICD-10-CM

## 2024-02-07 DIAGNOSIS — R49.8 WEAKNESS OF VOICE: ICD-10-CM

## 2024-02-07 DIAGNOSIS — R05.3 CHRONIC COUGHING: Primary | ICD-10-CM

## 2024-02-20 ENCOUNTER — OFFICE VISIT (OUTPATIENT)
Dept: OPHTHALMOLOGY | Facility: CLINIC | Age: 69
End: 2024-02-20
Payer: MEDICARE

## 2024-02-20 DIAGNOSIS — G35 MULTIPLE SCLEROSIS: Primary | ICD-10-CM

## 2024-02-20 DIAGNOSIS — H53.2 DIPLOPIA: ICD-10-CM

## 2024-02-20 DIAGNOSIS — H47.293 PARTIAL OPTIC ATROPHY OF BOTH EYES: ICD-10-CM

## 2024-02-20 PROCEDURE — 99202 OFFICE O/P NEW SF 15 MIN: CPT | Mod: S$GLB,,, | Performed by: OPHTHALMOLOGY

## 2024-02-20 PROCEDURE — 99999 PR PBB SHADOW E&M-EST. PATIENT-LVL II: CPT | Mod: PBBFAC,,, | Performed by: OPHTHALMOLOGY

## 2024-02-20 NOTE — PATIENT INSTRUCTIONS
Mr. German reported no diplopia today and had no evidence of ocular misalignment. 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 I recommended he see  a provider for a refraction and an eyeglass prescription. He will return as needed.

## 2024-02-20 NOTE — LETTER
[facility] - [address] [place] [address]  [address]  [facility] [address]  Phone: [phone]  Fax: [phone]   February 20, 2024    Kim Lyon MD  [address]  [facility] [address]    Patient: Michael German   MR Number: [id number]   YOB: 1955   Date of Visit: 2/20/2024       Dear Dr. Lyon:    Thank you for referring Michael German to me for evaluation. Here is my assessment and plan of care:    Assessment/Plan    For exam results, see Encounter Report.    Multiple sclerosis    Diplopia    Partial optic atrophy of both eyes      Mr. German reported no diplopia today and had no evidence of ocular misalignment. 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 I recommended he see  a provider for a refraction and an eyeglass prescription. He will return as needed.          Below you will find my full exam findings. If you have questions, please do not hesitate to call me. I look forward to following Mr. Michael German along with you.    Sincerely,          Elton Figueredo MD       CC  No Recipients             Base Eye Exam       Visual Acuity (Snellen - Linear)         Right Left    Dist sc 20/100 -1 20/50    Dist ph sc 20/70 NI              Tonometry (Tonopen, 11:54 AM)         Right Left    Pressure 20 20              Pupils         Dark Light Shape React APD    Right 3 3 Round Minimal +3    Left 3 3 Round Minimal +3              Visual Fields (Counting fingers)         Right Left     Full Full              Extraocular Movement         Right Left     Full, Ortho Full, Ortho   No DANNA             Neuro/Psych       Oriented x3: Yes    Mood/Affect: Normal              Dilation       Both eyes: 2.5% Phenylephrine, 1% Mydriacyl @ 11:52 AM                  Additional Tests       Color         Right Left    Ishihara No control Control only                  Slit Lamp and Fundus Exam       External Exam         Right Left     External Normal Normal              Pen Light Exam         Right Left    Lids/Lashes Normal Normal    Conjunctiva/Sclera White and quiet White and quiet    Cornea Clear Clear    Anterior Chamber Deep and quiet Deep and quiet    Iris Round and reactive Round and reactive    Lens 1+ Nuclear sclerosis 1+ Nuclear sclerosis    Vitreous Normal Normal              Fundus Exam         Right Left    Disc 4+ Pallor temporally 4+ Pallor temporally    C/D Ratio 0.3 0.3    Macula Normal Normal    Vessels Normal Normal

## 2024-02-20 NOTE — PROGRESS NOTES
HPI    Referred by Dr.Bagert SIMMS  Hx of MS.  Patient states OU vision seem to move around when he focus on an image.   (Past 6 months)  Not sure if its double  No eye pain.  Using +2.75 readers more often.    I have personally interviewed the patient, reviewed the history and   examined the patient and agree with the technician's exam.  Last edited by Elton Figueredo MD on 2/20/2024 11:29 AM.            Assessment /Plan     For exam results, see Encounter Report.    Multiple sclerosis    Diplopia    Partial optic atrophy of both eyes      Mr. German reported no diplopia today and had no evidence of ocular misalignment. 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 I recommended he see  a provider for a refraction and an eyeglass prescription. He will return as needed.

## 2024-03-25 ENCOUNTER — OFFICE VISIT (OUTPATIENT)
Dept: NEUROLOGY | Facility: CLINIC | Age: 69
End: 2024-03-25
Payer: MEDICARE

## 2024-03-25 DIAGNOSIS — Z78.9 IMPAIRED MOBILITY AND ADLS: ICD-10-CM

## 2024-03-25 DIAGNOSIS — N31.9 NEUROGENIC DYSFUNCTION OF THE URINARY BLADDER: ICD-10-CM

## 2024-03-25 DIAGNOSIS — R79.89 ABNORMAL CBC: ICD-10-CM

## 2024-03-25 DIAGNOSIS — R25.2 SPASTICITY: ICD-10-CM

## 2024-03-25 DIAGNOSIS — G35 MULTIPLE SCLEROSIS: Primary | ICD-10-CM

## 2024-03-25 DIAGNOSIS — Z71.89 COUNSELING REGARDING GOALS OF CARE: ICD-10-CM

## 2024-03-25 DIAGNOSIS — Z74.09 IMPAIRED MOBILITY AND ADLS: ICD-10-CM

## 2024-03-25 PROCEDURE — 99215 OFFICE O/P EST HI 40 MIN: CPT | Mod: S$GLB,,, | Performed by: CLINICAL NURSE SPECIALIST

## 2024-03-25 PROCEDURE — G2211 COMPLEX E/M VISIT ADD ON: HCPCS | Mod: S$GLB,,, | Performed by: CLINICAL NURSE SPECIALIST

## 2024-03-25 NOTE — PROGRESS NOTES
Subjective:          Patient ID: Michael German is a 68 y.o. male who presents today for a routine clinic visit for MS.  He was last seen by Dr. Lyon in September 2023. The history has been provided by the patient.       MS HPI:  DMT: None   He recently saw Dr. Figueredo in neuro-ophtho. He continues to have double vision.   He thinks he had laryngitis recently, but no other infections. He does have a lot of sinus congestion. He is using Flonase.   He is getting PT daily.   He reports that his sacral wound is healed.   Living at [facility]; things are going well   He is doing voice training/exercises for hypophonia. He occasionally feels like it is hard to take in a deep breath. He saw an ENT. 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 He is speech therapy for this, but surgery is an option.   He is sleeping well. He does not nap during the day.   He is wearing Depends. He denies UTIs. Depends are changed 3-4 times a day.   He feels like his memory is pretty good. Mood is stable.   He does not choke on food or drinks.   His current power wheelchair is 9 years old. It is hard to turn on, and the wheels are getting worn down. [employer] is coming out this week to assess. He may be getting a new chair.   He is usually able to eat independently, except for cutting things.     Medications:  Current Outpatient Medications   Medication Sig    acetaminophen (TYLENOL) 325 MG tablet Take 325 mg by mouth every 6 (six) hours as needed for Pain (2 TAB).    ascorbic acid, vitamin C, (VITAMIN C) 1000 MG tablet Take 500 mg by mouth 2 (two) times a day.    atorvastatin (LIPITOR) 20 MG tablet Take 20 mg by mouth every evening.    b complex vitamins tablet Take 1 tablet by mouth once daily.    baclofen (LIORESAL) 5 mg Tab tablet     BRILINTA 90 mg tablet Take 1 tablet (90 mg total) by mouth every 12 (twelve) hours.    calcium  carbonate (TUMS) 200 mg calcium (500 mg) chewable tablet Take 2 tablets by mouth every 8 (eight) hours as needed for Heartburn.    carvediloL (COREG) 6.25 MG tablet Take 1 tablet (6.25 mg total) by mouth 2 (two) times daily.    gabapentin (NEURONTIN) 100 MG capsule Take 1 capsule (100 mg total) by mouth 3 (three) times daily. (Patient not taking: Reported on 1/12/2023)    guaiFENesin 200 mg tablet Take 200 mg by mouth every 6 (six) hours as needed. 200mg/10ml    losartan (COZAAR) 100 MG tablet Take 1 tablet (100 mg total) by mouth once daily.    losartan (COZAAR) 50 MG tablet     mirabegron (MYRBETRIQ) 50 mg Tb24 Take 50 mg by mouth once daily.    nitroGLYCERIN (NITROSTAT) 0.4 MG SL tablet Place 1 tablet (0.4 mg total) under the tongue every 5 (five) minutes as needed for Chest pain. DO NOT EXCEED A TOTAL OF 3 DOSES IN 15 MINUTES    omeprazole (PRILOSEC) 20 MG capsule Take 20 mg by mouth once daily.    pyridoxine, vitamin B6, (B-6) 100 MG Tab Take 100 mg by mouth once daily.    sodium chloride (OCEAN) 0.65 % nasal spray 2 sprays by Nasal route every 8 (eight) hours as needed for Congestion.     SOCIAL HISTORY  Social History     Tobacco Use    Smoking status: Never    Smokeless tobacco: Never   Substance Use Topics    Alcohol use: Yes     Alcohol/week: 1.0 standard drink of alcohol     Types: 1 Cans of beer per week     Comment: occasionally drinks    Drug use: No       Living arrangements - the patient lives in a nursing home.             Objective:        1. 25 foot timed walk: N/A    Neurologic Exam      In power chair  Leaning to the right; on Randee sling;  Right hemiplegia  Right arm is straight at his side. Right arm is very difficult to move. Left arm has more mobility, but is also very stiff.. He can do RSM in left hand (slow and difficult).   He can feel the tuning fork in both knees and hands, just for a few seconds in his feet.   Normal EOM. No obvious facial asymmetry or impaired sensation.   No obvious  edema in the lower extremities. Reflexes 2+ in knees and left arm.   Imaging:         Results for orders placed during the hospital encounter of 03/03/20    MRI Brain Demyelinating W W/O Contrast    Impression  Significant motion degradation.    White matter changes as described above, not significantly changed compared to the previous examination.    Involutional changes.      Electronically signed by: Yaneli Cantu IV., MD  Date:    03/03/2020  Time:    13:46      Narrative & Impression  CT CERVICAL SPINE WITHOUT CONTRAST     CLINICAL DATA: Cervical spinal stenosis     CMS MANDATED QUALITY DATA - CT RADIATION  436     All CT scans at this facility utilize dose modulation, iterative reconstruction, and/or weight based dosing when appropriate to reduce radiation dose to as low as reasonably achievable.     Findings: Thin section axial images were obtained, with reformatted imaging in the sagittal and coronal planes. Comparison is made to a prior CT from 2016.     Changes of anterior instrumented fusion are noted at C3-4, new compared to the prior study. Bone graft material appears adequately corporative. There is no CT evidence of hardware loosening. No fracture or osseous destructive lesion is identified. There is mild C2 retrolisthesis. Alignment at all of the levels is normal.     C2-3: Mild dorsal osteophytic ridging and right greater than left uncinate spurring are noted, similar to the prior study. There is mild right C3 foraminal stenosis.     C3-4: There is moderate-severe left-sided degenerative facet arthropathy, increased compared to the prior study. In combination with uncinate spurring, there is moderate-severe left C4 foraminal stenosis. There is mild narrowing of the C4 foramen on the right.     C4-5: There is moderate bilateral degenerative facet hypertrophy, left greater than right, new compared to the prior study. In combination with uncinate spurring, there is moderate right-sided foraminal  stenosis.     C5-6: There is mild-to-moderate bilateral degenerative facet arthropathy, increased compared to the prior study. There is resultant severe right-sided foraminal stenosis, with mild foraminal narrowing on the left.     C6-7: Mild bilateral degenerative facet hypertrophy. No significant spinal or foraminal stenosis.     C7-T1: Mild bilateral degenerative facet hypertrophy, without significant spinal or foraminal stenosis.     IMPRESSION:  1. Anterior instrumented fusion at C2-3, without complicating features.  2. Multilevel cervical degenerative disc/facet disease. There is no evidence of high-grade spinal stenosis, however significant multilevel foraminal narrowing is demonstrated. Please see details as noted at each individual level above.     Electronically signed by:  Denzel Underwood MD  10/03/2023 03:58 PM CDT Workstation: [id number]    Labs:     Lab Results   Component Value Date    [id number] 25 (L) 04/13/2021    [id number] 27 (L) 08/17/2020    [id number] 29 (L) 02/20/2019         Lab Results   Component Value Date    WBC 8.96 01/29/2024    RBC 4.94 01/29/2024    HGB 15.0 01/29/2024    HCT 44.5 01/29/2024    MCV 90 01/29/2024    MCH 30.4 01/29/2024    MCHC 33.7 01/29/2024    RDW 13.8 01/29/2024     01/29/2024    MPV 9.8 01/29/2024    GRAN 6.0 01/29/2024    GRAN 66.5 01/29/2024    LYMPH 1.7 01/29/2024    LYMPH 19.4 01/29/2024    MONO 0.9 01/29/2024    MONO 9.8 01/29/2024    EOS 0.3 01/29/2024    BASO 0.02 01/29/2024    EOSINOPHIL 3.7 01/29/2024    BASOPHIL 0.2 01/29/2024       Elevated WBC and ANC on labs from February; will send order for repeat.     MS Impression and Plan:     NEURO MULTIPLE SCLEROSIS IMPRESSION:   MS Status:     Number of relapses in the past year?:  0    Clinical Progression comment:  He has advanced disability from progressive MS.   Plan:     DMT:  No change in management    Symptom Management:  Implement change in symptom management    Implement Change in  Symptom Management:  Spasticity and Upper Extermity Dysfunction; he will continue PT at [facility]        Order sent for CBC to repeat in light of elevated WBC and ANC at last draw.     He will follow up with Dr. Lyon in late September.      Total time spent with patient: 31 minutes  Total time spent on encounter: 40 minutes     The visit today is associated with current or anticipated ongoing medical care related to this patient's single serious condition/complex condition of multiple sclerosis.        JORDAN Mccauley, CNS    Problem List Items Addressed This Visit    None  Visit Diagnoses       Abnormal CBC    -  Primary    Relevant Orders    CBC auto differential

## 2024-05-01 ENCOUNTER — TELEPHONE (OUTPATIENT)
Dept: NEUROLOGY | Facility: CLINIC | Age: 69
End: 2024-05-01
Payer: MEDICARE

## 2024-05-01 NOTE — TELEPHONE ENCOUNTER
----- Message from Spring Vaughan MA sent at 3/25/2024 11:55 AM CDT -----  Call to schedule pt for a 6 mo f/u with BLADIMIR.

## 2024-05-01 NOTE — TELEPHONE ENCOUNTER
Spoke to pt's wife, Bindu, and scheduled pt for an in clinic appt with Dr. Lyon on 9/25 at 10:20 AM.

## 2024-05-02 ENCOUNTER — PATIENT MESSAGE (OUTPATIENT)
Dept: PSYCHIATRY | Facility: CLINIC | Age: 69
End: 2024-05-02
Payer: MEDICARE

## 2024-05-20 ENCOUNTER — PATIENT MESSAGE (OUTPATIENT)
Dept: PSYCHIATRY | Facility: CLINIC | Age: 69
End: 2024-05-20
Payer: MEDICARE

## 2024-07-25 ENCOUNTER — PATIENT MESSAGE (OUTPATIENT)
Dept: PSYCHIATRY | Facility: CLINIC | Age: 69
End: 2024-07-25
Payer: MEDICARE

## 2024-08-05 ENCOUNTER — PATIENT MESSAGE (OUTPATIENT)
Dept: PSYCHIATRY | Facility: CLINIC | Age: 69
End: 2024-08-05
Payer: MEDICARE

## 2024-09-25 ENCOUNTER — OFFICE VISIT (OUTPATIENT)
Dept: NEUROLOGY | Facility: CLINIC | Age: 69
End: 2024-09-25
Payer: MEDICARE

## 2024-09-25 VITALS
DIASTOLIC BLOOD PRESSURE: 71 MMHG | SYSTOLIC BLOOD PRESSURE: 107 MMHG | HEIGHT: 68 IN | HEART RATE: 69 BPM | BODY MASS INDEX: 24.94 KG/M2

## 2024-09-25 DIAGNOSIS — Z71.89 COUNSELING REGARDING GOALS OF CARE: ICD-10-CM

## 2024-09-25 DIAGNOSIS — G35 MULTIPLE SCLEROSIS: Primary | ICD-10-CM

## 2024-09-25 DIAGNOSIS — R25.2 SPASTICITY: ICD-10-CM

## 2024-09-25 DIAGNOSIS — N31.9 NEUROGENIC DYSFUNCTION OF THE URINARY BLADDER: ICD-10-CM

## 2024-09-25 DIAGNOSIS — Z78.9 IMPAIRED MOBILITY AND ADLS: ICD-10-CM

## 2024-09-25 DIAGNOSIS — Z74.09 IMPAIRED MOBILITY AND ADLS: ICD-10-CM

## 2024-09-25 DIAGNOSIS — G82.20 PARAPARESIS: ICD-10-CM

## 2024-09-25 PROCEDURE — 3074F SYST BP LT 130 MM HG: CPT | Mod: CPTII,S$GLB,, | Performed by: PSYCHIATRY & NEUROLOGY

## 2024-09-25 PROCEDURE — 1101F PT FALLS ASSESS-DOCD LE1/YR: CPT | Mod: CPTII,S$GLB,, | Performed by: PSYCHIATRY & NEUROLOGY

## 2024-09-25 PROCEDURE — G2211 COMPLEX E/M VISIT ADD ON: HCPCS | Mod: S$GLB,,, | Performed by: PSYCHIATRY & NEUROLOGY

## 2024-09-25 PROCEDURE — 99999 PR PBB SHADOW E&M-EST. PATIENT-LVL III: CPT | Mod: PBBFAC,,, | Performed by: PSYCHIATRY & NEUROLOGY

## 2024-09-25 PROCEDURE — 1159F MED LIST DOCD IN RCRD: CPT | Mod: CPTII,S$GLB,, | Performed by: PSYCHIATRY & NEUROLOGY

## 2024-09-25 PROCEDURE — 3008F BODY MASS INDEX DOCD: CPT | Mod: CPTII,S$GLB,, | Performed by: PSYCHIATRY & NEUROLOGY

## 2024-09-25 PROCEDURE — 3078F DIAST BP <80 MM HG: CPT | Mod: CPTII,S$GLB,, | Performed by: PSYCHIATRY & NEUROLOGY

## 2024-09-25 PROCEDURE — 99214 OFFICE O/P EST MOD 30 MIN: CPT | Mod: S$GLB,,, | Performed by: PSYCHIATRY & NEUROLOGY

## 2024-09-25 PROCEDURE — 3288F FALL RISK ASSESSMENT DOCD: CPT | Mod: CPTII,S$GLB,, | Performed by: PSYCHIATRY & NEUROLOGY

## 2024-09-25 PROCEDURE — 1160F RVW MEDS BY RX/DR IN RCRD: CPT | Mod: CPTII,S$GLB,, | Performed by: PSYCHIATRY & NEUROLOGY

## 2024-09-25 NOTE — PROGRESS NOTES
Subjective:          Patient ID: Michael German is a 69 y.o. male who presents today for a routine clinic visit for MS.      MS HPI:  DMT: None  Taking vitamin D3 as recommended?   Continues to live at [facility]  He has a new power chair which he likes -   No UTIs. In Depents  Having chronic sinus drip - was given a med recently which helped, but then it was discontinued and the symptoms returned; he's not sure what is was called, but plans to find out.  Flonase is not helpful for this.   He gets therapy daily for 30 minutes - restorative PT;  He likes this very much  Most transfers are via the Randee.   No pain  He has spasticity but no pain; on baclofen 5mg BID   On Myrbetriq  He's angry that he's in a NH, but denies suicidal ideation; not interested in started any meds for this.     Medications:  Current Outpatient Medications   Medication Sig    acetaminophen (TYLENOL) 325 MG tablet Take 325 mg by mouth every 6 (six) hours as needed for Pain (2 TAB).    ascorbic acid, vitamin C, (VITAMIN C) 1000 MG tablet Take 500 mg by mouth 2 (two) times a day.    atorvastatin (LIPITOR) 20 MG tablet Take 20 mg by mouth every evening.    b complex vitamins tablet Take 1 tablet by mouth once daily.    baclofen (LIORESAL) 5 mg Tab tablet     BRILINTA 90 mg tablet Take 1 tablet (90 mg total) by mouth every 12 (twelve) hours.    calcium carbonate (TUMS) 200 mg calcium (500 mg) chewable tablet Take 2 tablets by mouth every 8 (eight) hours as needed for Heartburn.    carvediloL (COREG) 6.25 MG tablet Take 1 tablet (6.25 mg total) by mouth 2 (two) times daily.    guaiFENesin 200 mg tablet Take 200 mg by mouth every 6 (six) hours as needed. 200mg/10ml    losartan (COZAAR) 100 MG tablet Take 1 tablet (100 mg total) by mouth once daily.    losartan (COZAAR) 50 MG tablet     mirabegron (MYRBETRIQ) 50 mg Tb24 Take 50 mg by mouth once daily.    nitroGLYCERIN (NITROSTAT) 0.4 MG SL tablet Place 1 tablet (0.4 mg total) under the tongue  every 5 (five) minutes as needed for Chest pain. DO NOT EXCEED A TOTAL OF 3 DOSES IN 15 MINUTES    omeprazole (PRILOSEC) 20 MG capsule Take 20 mg by mouth once daily.    pyridoxine, vitamin B6, (B-6) 100 MG Tab Take 100 mg by mouth once daily.    sodium chloride (OCEAN) 0.65 % nasal spray 2 sprays by Nasal route every 8 (eight) hours as needed for Congestion.    gabapentin (NEURONTIN) 100 MG capsule Take 1 capsule (100 mg total) by mouth 3 (three) times daily. (Patient not taking: Reported on 1/12/2023)     No current facility-administered medications for this visit.       SOCIAL HISTORY  Social History     Tobacco Use    Smoking status: Never    Smokeless tobacco: Never   Substance Use Topics    Alcohol use: Yes     Alcohol/week: 1.0 standard drink of alcohol     Types: 1 Cans of beer per week     Comment: occasionally drinks    Drug use: No       Living arrangements - the patient lives in a nursing home.                 Objective:          Neurologic Exam    In Power chair  Slumped to the right; on Randee sling;   Right hemiplegia  LUE 3/5 in extensors and LLE plegic  Unable to feel tuning fork on right arm or leg  Imaging:     Results for orders placed during the hospital encounter of 02/27/19    MRI Brain Demyelinating Without Contrast    Impression  1. There is no acute abnormality.  Also, there is no definite or significant change in the appearance of the brain compared to the prior study.  The study is motion degraded.  There is moderate volume loss.  There is a moderate burden of white matter disease.  These findings were present previously and are consistent with the provided diagnosis of multiple sclerosis.  There is not been any significant interval progression.  There is no acute infarction, hemorrhage or mass/mass effect.  2. Persistent right mastoid partial opacification may represent an effusion.      Electronically signed by: Satish Wan MD  Date:    02/27/2019  Time:    11:31    No results  found for this or any previous visit.    No results found for this or any previous visit.    Results for orders placed during the hospital encounter of 03/03/20    MRI Brain Demyelinating W W/O Contrast    Impression  Significant motion degradation.    White matter changes as described above, not significantly changed compared to the previous examination.    Involutional changes.      Electronically signed by: Yaneli Cantu IV., MD  Date:    03/03/2020  Time:    13:46    No results found for this or any previous visit.    No results found for this or any previous visit.        Labs:     Lab Results   Component Value Date    ZVSVFWDT99IX 25 (L) 04/13/2021    YZGJEMDV43UL 27 (L) 08/17/2020    WJCAHHUD80LL 29 (L) 02/20/2019     Lab Results   Component Value Date    JCVINDEX 0.50 (A) 05/11/2016    JCVANTIBODY Positive (A) 05/11/2016     Lab Results   Component Value Date    MZ2DQSSP 65.6 05/11/2016    ABSOLUTECD3 476 (L) 05/11/2016    BS9RAEEY 21.2 05/11/2016    ABSOLUTECD8 154 (L) 05/11/2016    AF9IPEVO 44.2 05/11/2016    ABSOLUTECD4 321 05/11/2016    LABCD48 2.09 05/11/2016     Lab Results   Component Value Date    WBC 8.96 01/29/2024    RBC 4.94 01/29/2024    HGB 15.0 01/29/2024    HCT 44.5 01/29/2024    MCV 90 01/29/2024    MCH 30.4 01/29/2024    MCHC 33.7 01/29/2024    RDW 13.8 01/29/2024     01/29/2024    MPV 9.8 01/29/2024    GRAN 6.0 01/29/2024    GRAN 66.5 01/29/2024    LYMPH 1.7 01/29/2024    LYMPH 19.4 01/29/2024    MONO 0.9 01/29/2024    MONO 9.8 01/29/2024    EOS 0.3 01/29/2024    BASO 0.02 01/29/2024    EOSINOPHIL 3.7 01/29/2024    BASOPHIL 0.2 01/29/2024     Sodium   Date Value Ref Range Status   01/29/2024 137 136 - 145 mmol/L Final     Potassium   Date Value Ref Range Status   01/29/2024 4.1 3.5 - 5.1 mmol/L Final     Chloride   Date Value Ref Range Status   01/29/2024 107 95 - 110 mmol/L Final     CO2   Date Value Ref Range Status   01/29/2024 23 23 - 29 mmol/L Final     Glucose   Date Value Ref  Range Status   01/29/2024 113 (H) 70 - 110 mg/dL Final     BUN   Date Value Ref Range Status   01/29/2024 11 8 - 23 mg/dL Final     Creatinine   Date Value Ref Range Status   01/29/2024 0.6 0.5 - 1.4 mg/dL Final   11/03/2012 0.9 0.5 - 1.4 mg/dL Final     Calcium   Date Value Ref Range Status   01/29/2024 9.1 8.7 - 10.5 mg/dL Final   11/03/2012 9.8 8.7 - 10.5 mg/dL Final     Total Protein   Date Value Ref Range Status   01/29/2024 6.8 6.0 - 8.4 g/dL Final     Albumin   Date Value Ref Range Status   01/29/2024 3.8 3.5 - 5.2 g/dL Final     Total Bilirubin   Date Value Ref Range Status   01/29/2024 0.5 0.1 - 1.0 mg/dL Final     Comment:     For infants and newborns, interpretation of results should be based  on gestational age, weight and in agreement with clinical  observations.    Premature Infant recommended reference ranges:  Up to 24 hours.............<8.0 mg/dL  Up to 48 hours............<12.0 mg/dL  3-5 days..................<15.0 mg/dL  6-29 days.................<15.0 mg/dL       Alkaline Phosphatase   Date Value Ref Range Status   01/29/2024 86 55 - 135 U/L Final     AST   Date Value Ref Range Status   01/29/2024 19 10 - 40 U/L Final     ALT   Date Value Ref Range Status   01/29/2024 25 10 - 44 U/L Final     Anion Gap   Date Value Ref Range Status   01/29/2024 7 (L) 8 - 16 mmol/L Final   11/03/2012 13 5 - 15 meq/L Final     eGFR if    Date Value Ref Range Status   12/27/2021 >60.0 >60 mL/min/1.73 m^2 Final     eGFR if non    Date Value Ref Range Status   12/27/2021 >60.0 >60 mL/min/1.73 m^2 Final     Comment:     Calculation used to obtain the estimated glomerular filtration  rate (eGFR) is the CKD-EPI equation.        Lab Results   Component Value Date    HEPBSAG Negative 08/17/2020    HEPBSAB Positive (A) 08/17/2020    HEPBCAB Negative 08/17/2020           MS Impression and Plan:     NEURO MULTIPLE SCLEROSIS IMPRESSION:   Number of relapses in the past year?:  0  Clinical  Progression:  Worsened  Type:  Progressive  MS Classification:  Secondarily Progressive MS  DMT:  No change in management  : will continue restorative PT - orders provided to NH.   Advanced disability from MS with gradual progressive decline  Restorative daily PT at [facility] is very helpful   New power chair also helpful   F/u 6 mo Jami Shaistamiguel CNS            Problem List Items Addressed This Visit          Unprioritized    Multiple sclerosis - Primary     Other Visit Diagnoses       Impaired mobility and ADLs        Spasticity        Neurogenic dysfunction of the urinary bladder        Counseling regarding goals of care                Kim Lyon MD    I spent a total of 31 minutes on the day of the visit.This includes face to face time and non-face to face time preparing to see the patient (eg, review of tests), obtaining and/or reviewing separately obtained history, documenting clinical information in the electronic or other health record, independently interpreting results and communicating results to the patient/family/caregiver, or care coordinator.  Visit today included increased complexity associated with the care of the episodic problem : debility; addressed and managing the longitudinal care of the patient due to the serious and/or complex managed problem(s) MS.

## 2024-12-16 ENCOUNTER — PATIENT MESSAGE (OUTPATIENT)
Dept: PSYCHIATRY | Facility: CLINIC | Age: 69
End: 2024-12-16
Payer: MEDICARE

## 2025-03-07 ENCOUNTER — PATIENT MESSAGE (OUTPATIENT)
Dept: PSYCHIATRY | Facility: CLINIC | Age: 70
End: 2025-03-07
Payer: MEDICARE

## 2025-03-25 ENCOUNTER — OFFICE VISIT (OUTPATIENT)
Dept: NEUROLOGY | Facility: CLINIC | Age: 70
End: 2025-03-25
Payer: MEDICARE

## 2025-03-25 VITALS
DIASTOLIC BLOOD PRESSURE: 76 MMHG | BODY MASS INDEX: 24.94 KG/M2 | HEART RATE: 77 BPM | SYSTOLIC BLOOD PRESSURE: 117 MMHG | HEIGHT: 68 IN

## 2025-03-25 DIAGNOSIS — R23.8 SKIN BREAKDOWN: ICD-10-CM

## 2025-03-25 DIAGNOSIS — Z71.89 COUNSELING REGARDING GOALS OF CARE: ICD-10-CM

## 2025-03-25 DIAGNOSIS — G35 MULTIPLE SCLEROSIS: Primary | ICD-10-CM

## 2025-03-25 DIAGNOSIS — R25.2 SPASTICITY: ICD-10-CM

## 2025-03-25 DIAGNOSIS — G82.20 PARAPARESIS: ICD-10-CM

## 2025-03-25 DIAGNOSIS — Z74.09 IMPAIRED MOBILITY AND ADLS: ICD-10-CM

## 2025-03-25 DIAGNOSIS — Z97.3 WEARS GLASSES: ICD-10-CM

## 2025-03-25 DIAGNOSIS — Z78.9 IMPAIRED MOBILITY AND ADLS: ICD-10-CM

## 2025-03-25 PROCEDURE — 1159F MED LIST DOCD IN RCRD: CPT | Mod: CPTII,S$GLB,, | Performed by: CLINICAL NURSE SPECIALIST

## 2025-03-25 PROCEDURE — 99215 OFFICE O/P EST HI 40 MIN: CPT | Mod: S$GLB,,, | Performed by: CLINICAL NURSE SPECIALIST

## 2025-03-25 PROCEDURE — 99999 PR PBB SHADOW E&M-EST. PATIENT-LVL IV: CPT | Mod: PBBFAC,,, | Performed by: CLINICAL NURSE SPECIALIST

## 2025-03-25 PROCEDURE — G2211 COMPLEX E/M VISIT ADD ON: HCPCS | Mod: S$GLB,,, | Performed by: CLINICAL NURSE SPECIALIST

## 2025-03-25 PROCEDURE — 3078F DIAST BP <80 MM HG: CPT | Mod: CPTII,S$GLB,, | Performed by: CLINICAL NURSE SPECIALIST

## 2025-03-25 PROCEDURE — 3008F BODY MASS INDEX DOCD: CPT | Mod: CPTII,S$GLB,, | Performed by: CLINICAL NURSE SPECIALIST

## 2025-03-25 PROCEDURE — 3074F SYST BP LT 130 MM HG: CPT | Mod: CPTII,S$GLB,, | Performed by: CLINICAL NURSE SPECIALIST

## 2025-03-25 RX ORDER — CETIRIZINE HYDROCHLORIDE 10 MG/1
10 TABLET, CHEWABLE ORAL DAILY
COMMUNITY

## 2025-03-25 RX ORDER — ZINC SULFATE 50(220)MG
220 CAPSULE ORAL DAILY
COMMUNITY

## 2025-03-25 RX ORDER — MOMETASONE FUROATE MONOHYDRATE 50 UG/1
2 SPRAY, METERED NASAL DAILY
COMMUNITY

## 2025-03-25 NOTE — Clinical Note
He asked about taking an additional 1000mg of Vitamin D to the 1400mg he is already getting in his multivitamin. He thinks that this helps with post nasal drip cough he has. I told him this was over recommended amount, but he was persistent. Thoughts?  Low blood counts noted 9/19/24.   Has chronic anemia likely secondary to poor nutritional status underlying chronic illness.    Hemoglobin declined to 7.1 given -1 unit PRBCs given 9/19/2024  Encourage oral intake, cannot utilize IV iron due to bacteremia   Lab Results   Component Value Date    HGB 8.6 (L) 09/26/2024    HGB 8.3 (L) 09/25/2024    HGB 7.6 (L) 09/24/2024    HGB 7.6 (L) 09/23/2024    HGB 7.6 (L) 09/22/2024   Ongoing monitoring of hemoglobin and continue to transfuse if less than 7

## 2025-03-25 NOTE — PROGRESS NOTES
Subjective:          Patient ID: Michael German is a 69 y.o. male who presents today for a routine clinic visit for MS.  He was last seen by Dr. Lyon in September. The history has been provided by the patient. He is accompanied by his wife.       MS HPI:  DMT: None  Taking vitamin D3 as recommended? Not on med list   He continues to reside at [facility] [place].   He feels like his left hand is becoming stiffer and weaker at times--every 3 weeks or so, then lasts for 2 days, then returns to baseline. This is happening more regularly now than it used to. This is making it difficult to control the joystick. He uses his left hand to do everything, so when it is bothering him, he needs help with eating.   He gets restorative physical therapy six days a week, and he feels better after he gets it. He gets range of motion, and they sometimes sit them at the edge of the bed. However, no one has been to see him since last Friday.   He has a sore on his bottom that resulted from a pinch from the Randee lift. He does not think it is a pressure ulcer. He thinks it is controlled right now, but it opens sometimes when he is cleaned.   All transfers are done with the Randee lift.   He denies any recent infections. He has a nasal drip, and he feels like his throat gets clogged sometimes. He denies any issues with speech/swallowing.   His voice is lower than it has been. He does not feel short of breath and does not endorse trouble taking a deep breath. He has a burning sensation in his chest when he coughs sometimes. He feels that the cough is due to nasal drip. When he is able to take higher doses of Vitamin C (requests 3000mg), he feels like his cough gets better.   He wears Depends for bowel and bladder. He is having regular bowel movements.   He sleeps ok, as long as he can get comfortable.   He feels like his vision is worse. He has trouble focusing, even with his glasses. He has trouble reading captions on the TV  screen.   Memory is stable.   He feels mad/angry, but not depressed.   He does not spend much time outside, but we discussed doing this today.   Temperature regulation is ok.   He had lymphedema in the left leg recently that resolved after wearing support stockings.       Medications:  Current Outpatient Medications   Medication Sig    acetaminophen (TYLENOL) 325 MG tablet Take 325 mg by mouth every 6 (six) hours as needed for Pain (2 TAB).    ascorbic acid, vitamin C, (VITAMIN C) 1000 MG tablet Take 500 mg by mouth 2 (two) times a day.    atorvastatin (LIPITOR) 20 MG tablet Take 20 mg by mouth every evening.    b complex vitamins tablet Take 1 tablet by mouth once daily.    baclofen (LIORESAL) 5 mg Tab tablet     BRILINTA 90 mg tablet Take 1 tablet (90 mg total) by mouth every 12 (twelve) hours.    calcium carbonate (TUMS) 200 mg calcium (500 mg) chewable tablet Take 2 tablets by mouth every 8 (eight) hours as needed for Heartburn.    carvediloL (COREG) 6.25 MG tablet Take 1 tablet (6.25 mg total) by mouth 2 (two) times daily.    guaiFENesin 200 mg tablet Take 200 mg by mouth every 6 (six) hours as needed. 200mg/10ml    losartan (COZAAR) 100 MG tablet Take 1 tablet (100 mg total) by mouth once daily.    losartan (COZAAR) 50 MG tablet     mirabegron (MYRBETRIQ) 50 mg Tb24 Take 50 mg by mouth once daily.    nitroGLYCERIN (NITROSTAT) 0.4 MG SL tablet Place 1 tablet (0.4 mg total) under the tongue every 5 (five) minutes as needed for Chest pain. DO NOT EXCEED A TOTAL OF 3 DOSES IN 15 MINUTES    omeprazole (PRILOSEC) 20 MG capsule Take 20 mg by mouth once daily.    pyridoxine, vitamin B6, (B-6) 100 MG Tab Take 100 mg by mouth once daily.    sodium chloride (OCEAN) 0.65 % nasal spray 2 sprays by Nasal route every 8 (eight) hours as needed for Congestion.    gabapentin (NEURONTIN) 100 MG capsule Take 1 capsule (100 mg total) by mouth 3 (three) times daily. (Patient not taking: Reported on 1/12/2023)     No current  facility-administered medications for this visit.       SOCIAL HISTORY  Social History[1]    Living arrangements - the patient lives in the nursing home.                Objective:        Neurological Exam  In power chair  Leaning to the right; on Randee sling;  Right hemiplegia  Right arm is straight at his side. Right arm is very difficult to move. Left arm has some mobility, but is also very stiff and hard to move. He can do RSM  and finger to nose in left hand (slow and difficult).   He can feel the tuning fork in both knees and hands, but does not feel it in ankles.   Normal EOM. No obvious facial asymmetry or impaired sensation.   Mild edema to left ankle compared to right. Reflexes 2+ in knees and 1+ left arm.   Lungs are clear to auscultation.     Imaging:       Results for orders placed during the hospital encounter of 03/03/20    MRI Brain Demyelinating W W/O Contrast    Impression  Significant motion degradation.    White matter changes as described above, not significantly changed compared to the previous examination.    Involutional changes.      Electronically signed by: Yaneli Cantu IV., MD  Date:    03/03/2020  Time:    13:46      Labs:     Lab Results   Component Value Date    [id number] 25 (L) 04/13/2021    [id number] 27 (L) 08/17/2020    [id number] 29 (L) 02/20/2019       Lab Results   Component Value Date    WBC 8.96 01/29/2024    RBC 4.94 01/29/2024    HGB 15.0 01/29/2024    HCT 44.5 01/29/2024    MCV 90 01/29/2024    MCH 30.4 01/29/2024    MCHC 33.7 01/29/2024    RDW 13.8 01/29/2024     01/29/2024    MPV 9.8 01/29/2024    GRAN 6.0 01/29/2024    GRAN 66.5 01/29/2024    LYMPH 1.7 01/29/2024    LYMPH 19.4 01/29/2024    MONO 0.9 01/29/2024    MONO 9.8 01/29/2024    EOS 0.3 01/29/2024    BASO 0.02 01/29/2024    EOSINOPHIL 3.7 01/29/2024    BASOPHIL 0.2 01/29/2024     Sodium   Date Value Ref Range Status   01/29/2024 137 136 - 145 mmol/L Final     Potassium   Date Value Ref Range Status    01/29/2024 4.1 3.5 - 5.1 mmol/L Final     Chloride   Date Value Ref Range Status   01/29/2024 107 95 - 110 mmol/L Final     CO2   Date Value Ref Range Status   01/29/2024 23 23 - 29 mmol/L Final     Glucose   Date Value Ref Range Status   01/29/2024 113 (H) 70 - 110 mg/dL Final     BUN   Date Value Ref Range Status   01/29/2024 11 8 - 23 mg/dL Final     Creatinine   Date Value Ref Range Status   01/29/2024 0.6 0.5 - 1.4 mg/dL Final   11/03/2012 0.9 0.5 - 1.4 mg/dL Final     Calcium   Date Value Ref Range Status   01/29/2024 9.1 8.7 - 10.5 mg/dL Final   11/03/2012 9.8 8.7 - 10.5 mg/dL Final     Total Protein   Date Value Ref Range Status   01/29/2024 6.8 6.0 - 8.4 g/dL Final     Albumin   Date Value Ref Range Status   01/29/2024 3.8 3.5 - 5.2 g/dL Final     Total Bilirubin   Date Value Ref Range Status   01/29/2024 0.5 0.1 - 1.0 mg/dL Final     Comment:     For infants and newborns, interpretation of results should be based  on gestational age, weight and in agreement with clinical  observations.    Premature Infant recommended reference ranges:  Up to 24 hours.............<8.0 mg/dL  Up to 48 hours............<12.0 mg/dL  3-5 days..................<15.0 mg/dL  6-29 days.................<15.0 mg/dL       Alkaline Phosphatase   Date Value Ref Range Status   01/29/2024 86 55 - 135 U/L Final     AST   Date Value Ref Range Status   01/29/2024 19 10 - 40 U/L Final     ALT   Date Value Ref Range Status   01/29/2024 25 10 - 44 U/L Final     Anion Gap   Date Value Ref Range Status   01/29/2024 7 (L) 8 - 16 mmol/L Final   11/03/2012 13 5 - 15 meq/L Final     eGFR if    Date Value Ref Range Status   12/27/2021 >60.0 >60 mL/min/1.73 m^2 Final     eGFR if non    Date Value Ref Range Status   12/27/2021 >60.0 >60 mL/min/1.73 m^2 Final     Comment:     Calculation used to obtain the estimated glomerular filtration  rate (eGFR) is the CKD-EPI equation.        Lab Results   Component Value Date     HEPBSAG Negative 08/17/2020    HEPBSAB Positive (A) 08/17/2020    HEPBCAB Negative 08/17/2020           MS Impression and Plan:     NEURO MULTIPLE SCLEROSIS IMPRESSION:   Clinical Progression:  Worsened  MS Classification:  Secondarily Progressive MS  Current DMT: none  Implement Disease Modifying Therapy:  None  Symptom Management:  Implement change in symptom management  Implement Change in Symptom Management comment: Will send order for continued restorative therapy at [facility] 6 days a week; will send order for wound care and attention to wound when cleaning patient. Will send lab orders for CBC, CMP, VIt D next month. Recommend that he takes 5000 units per day of Vit D.   Will request that nursing home PCP eval for use of cough medicine for chronic cough and PPI for reflux.   Referral placed to optometry for eye exam.   Additional Impressions:   He will follow up with Dr. Lyon in 6 months.   The visit today is associated with current or anticipated ongoing medical care related to this patient's single serious condition/complex condition of multiple sclerosis.      Total time spent with patient: 47 minutes   Total time spent on encounter: 60 minutes         JORDAN Mccauley, CNS    Problem List Items Addressed This Visit          Neurologic Problems    Multiple sclerosis - Primary       Other    Paraparesis     Other Visit Diagnoses         Wears glasses        Relevant Orders    Ambulatory referral/consult to Optometry      Counseling regarding goals of care          Spasticity          Impaired mobility and ADLs          Skin breakdown                       [1]   Social History  Tobacco Use    Smoking status: Never    Smokeless tobacco: Never   Substance Use Topics    Alcohol use: Yes     Alcohol/week: 1.0 standard drink of alcohol     Types: 1 Cans of beer per week     Comment: occasionally drinks    Drug use: No

## 2025-03-31 ENCOUNTER — PATIENT MESSAGE (OUTPATIENT)
Dept: OPHTHALMOLOGY | Facility: CLINIC | Age: 70
End: 2025-03-31
Payer: MEDICARE

## 2025-05-13 ENCOUNTER — PATIENT MESSAGE (OUTPATIENT)
Dept: PSYCHIATRY | Facility: CLINIC | Age: 70
End: 2025-05-13
Payer: MEDICARE

## 2025-06-05 ENCOUNTER — TELEPHONE (OUTPATIENT)
Dept: NEUROLOGY | Facility: CLINIC | Age: 70
End: 2025-06-05
Payer: MEDICARE

## 2025-06-19 ENCOUNTER — PATIENT MESSAGE (OUTPATIENT)
Dept: PSYCHIATRY | Facility: CLINIC | Age: 70
End: 2025-06-19
Payer: MEDICARE

## 2025-06-20 ENCOUNTER — PATIENT MESSAGE (OUTPATIENT)
Dept: NEUROLOGY | Facility: CLINIC | Age: 70
End: 2025-06-20
Payer: MEDICARE

## 2025-07-30 ENCOUNTER — PATIENT MESSAGE (OUTPATIENT)
Dept: PSYCHIATRY | Facility: CLINIC | Age: 70
End: 2025-07-30
Payer: MEDICARE

## 2025-08-01 ENCOUNTER — PATIENT MESSAGE (OUTPATIENT)
Dept: PSYCHIATRY | Facility: CLINIC | Age: 70
End: 2025-08-01
Payer: MEDICARE

## 2025-08-06 ENCOUNTER — TELEPHONE (OUTPATIENT)
Dept: OPTOMETRY | Facility: CLINIC | Age: 70
End: 2025-08-06
Payer: MEDICARE

## 2025-08-06 NOTE — TELEPHONE ENCOUNTER
Called to let pt know  will be out for appmt due to fam emergency, will call pt again to r/s appmt

## 2025-08-07 ENCOUNTER — PATIENT MESSAGE (OUTPATIENT)
Dept: OPTOMETRY | Facility: CLINIC | Age: 70
End: 2025-08-07
Payer: MEDICARE

## 2025-08-07 ENCOUNTER — TELEPHONE (OUTPATIENT)
Dept: OPTOMETRY | Facility: CLINIC | Age: 70
End: 2025-08-07
Payer: MEDICARE

## 2025-08-18 ENCOUNTER — OFFICE VISIT (OUTPATIENT)
Dept: OPTOMETRY | Facility: CLINIC | Age: 70
End: 2025-08-18
Payer: COMMERCIAL

## 2025-08-18 DIAGNOSIS — H52.7 REFRACTIVE ERROR: ICD-10-CM

## 2025-08-18 DIAGNOSIS — H25.13 NUCLEAR SCLEROSIS, BILATERAL: ICD-10-CM

## 2025-08-18 DIAGNOSIS — E11.9 DIABETES MELLITUS TYPE 2 WITHOUT RETINOPATHY: ICD-10-CM

## 2025-08-18 DIAGNOSIS — Z01.00 EXAMINATION OF EYES AND VISION: Primary | ICD-10-CM

## 2025-08-18 DIAGNOSIS — H26.9 CORTICAL CATARACT: ICD-10-CM

## 2025-08-18 PROCEDURE — 99999 PR PBB SHADOW E&M-EST. PATIENT-LVL II: CPT | Mod: PBBFAC,,, | Performed by: OPTOMETRIST

## 2025-08-18 PROCEDURE — 92004 COMPRE OPH EXAM NEW PT 1/>: CPT | Mod: S$GLB,,, | Performed by: OPTOMETRIST
